# Patient Record
Sex: FEMALE | Race: WHITE | NOT HISPANIC OR LATINO | Employment: OTHER | URBAN - METROPOLITAN AREA
[De-identification: names, ages, dates, MRNs, and addresses within clinical notes are randomized per-mention and may not be internally consistent; named-entity substitution may affect disease eponyms.]

---

## 2017-05-08 PROBLEM — I82.811 THROMBOSIS OF RIGHT SAPHENOUS VEIN: Status: ACTIVE | Noted: 2017-05-08

## 2017-05-08 PROBLEM — Z86.718 HISTORY OF DEEP VEIN THROMBOSIS OF LOWER EXTREMITY: Status: ACTIVE | Noted: 2017-05-08

## 2017-05-23 ENCOUNTER — CONSULT (OUTPATIENT)
Dept: ONCOLOGY | Facility: CLINIC | Age: 62
End: 2017-05-23

## 2017-05-23 VITALS
TEMPERATURE: 97.4 F | RESPIRATION RATE: 22 BRPM | HEIGHT: 69 IN | HEART RATE: 66 BPM | WEIGHT: 293 LBS | SYSTOLIC BLOOD PRESSURE: 139 MMHG | DIASTOLIC BLOOD PRESSURE: 67 MMHG | BODY MASS INDEX: 43.4 KG/M2

## 2017-05-23 DIAGNOSIS — Z86.718 HISTORY OF DEEP VEIN THROMBOSIS OF LOWER EXTREMITY: Primary | ICD-10-CM

## 2017-05-23 PROCEDURE — 99243 OFF/OP CNSLTJ NEW/EST LOW 30: CPT | Performed by: INTERNAL MEDICINE

## 2017-05-23 RX ORDER — OXYCODONE HYDROCHLORIDE AND ACETAMINOPHEN 5; 325 MG/1; MG/1
TABLET ORAL
Refills: 0 | COMMUNITY
Start: 2017-03-07 | End: 2021-03-05 | Stop reason: HOSPADM

## 2017-05-23 RX ORDER — LEVOTHYROXINE SODIUM 137 UG/1
137 TABLET ORAL DAILY
Refills: 3 | COMMUNITY
Start: 2017-04-10

## 2018-05-07 ENCOUNTER — TRANSCRIBE ORDERS (OUTPATIENT)
Dept: ADMINISTRATIVE | Facility: HOSPITAL | Age: 63
End: 2018-05-07

## 2018-05-07 ENCOUNTER — HOSPITAL ENCOUNTER (OUTPATIENT)
Dept: GENERAL RADIOLOGY | Facility: HOSPITAL | Age: 63
Discharge: HOME | End: 2018-05-07
Attending: FAMILY MEDICINE | Admitting: FAMILY MEDICINE

## 2018-05-07 DIAGNOSIS — M54.50 LOW BACK PAIN AT MULTIPLE SITES: ICD-10-CM

## 2018-05-07 DIAGNOSIS — M25.552 LEFT HIP PAIN: ICD-10-CM

## 2018-05-07 DIAGNOSIS — M25.552 LEFT HIP PAIN: Primary | ICD-10-CM

## 2018-05-07 PROCEDURE — 72100 X-RAY EXAM L-S SPINE 2/3 VWS: CPT

## 2018-05-07 PROCEDURE — 73502 X-RAY EXAM HIP UNI 2-3 VIEWS: CPT

## 2020-01-30 ENCOUNTER — PREP FOR SURGERY (OUTPATIENT)
Dept: OTHER | Facility: HOSPITAL | Age: 65
End: 2020-01-30

## 2020-01-30 DIAGNOSIS — Z12.11 SPECIAL SCREENING FOR MALIGNANT NEOPLASMS, COLON: Primary | ICD-10-CM

## 2020-01-30 RX ORDER — DEXTROSE, SODIUM CHLORIDE, SODIUM LACTATE, POTASSIUM CHLORIDE, AND CALCIUM CHLORIDE 5; .6; .31; .03; .02 G/100ML; G/100ML; G/100ML; G/100ML; G/100ML
100 INJECTION, SOLUTION INTRAVENOUS CONTINUOUS
Status: CANCELLED | OUTPATIENT
Start: 2020-01-30

## 2020-01-30 RX ORDER — SODIUM CHLORIDE 0.9 % (FLUSH) 0.9 %
10 SYRINGE (ML) INJECTION AS NEEDED
Status: CANCELLED | OUTPATIENT
Start: 2020-01-30

## 2020-01-30 RX ORDER — SODIUM CHLORIDE 0.9 % (FLUSH) 0.9 %
3 SYRINGE (ML) INJECTION EVERY 12 HOURS SCHEDULED
Status: CANCELLED | OUTPATIENT
Start: 2020-01-30

## 2020-02-12 PROBLEM — Z12.11 SPECIAL SCREENING FOR MALIGNANT NEOPLASMS, COLON: Status: ACTIVE | Noted: 2020-02-12

## 2021-02-19 ENCOUNTER — APPOINTMENT (OUTPATIENT)
Dept: GENERAL RADIOLOGY | Facility: HOSPITAL | Age: 66
End: 2021-02-19

## 2021-02-19 ENCOUNTER — APPOINTMENT (OUTPATIENT)
Dept: CT IMAGING | Facility: HOSPITAL | Age: 66
End: 2021-02-19

## 2021-02-19 ENCOUNTER — HOSPITAL ENCOUNTER (INPATIENT)
Facility: HOSPITAL | Age: 66
LOS: 14 days | Discharge: HOME HEALTH | End: 2021-03-05
Attending: EMERGENCY MEDICINE | Admitting: INTERNAL MEDICINE

## 2021-02-19 DIAGNOSIS — L02.416 ABSCESS OF LEFT THIGH: ICD-10-CM

## 2021-02-19 DIAGNOSIS — E66.2 CLASS 3 OBESITY WITH ALVEOLAR HYPOVENTILATION WITHOUT SERIOUS COMORBIDITY WITH BODY MASS INDEX (BMI) OF 50.0 TO 59.9 IN ADULT (HCC): ICD-10-CM

## 2021-02-19 DIAGNOSIS — J81.0 ACUTE PULMONARY EDEMA (HCC): ICD-10-CM

## 2021-02-19 DIAGNOSIS — A41.9 SEPSIS DUE TO CELLULITIS (HCC): ICD-10-CM

## 2021-02-19 DIAGNOSIS — Z86.718 HISTORY OF DEEP VEIN THROMBOSIS OF LOWER EXTREMITY: ICD-10-CM

## 2021-02-19 DIAGNOSIS — A41.9 SEPSIS, DUE TO UNSPECIFIED ORGANISM, UNSPECIFIED WHETHER ACUTE ORGAN DYSFUNCTION PRESENT (HCC): Primary | ICD-10-CM

## 2021-02-19 DIAGNOSIS — M05.79 RHEUMATOID ARTHRITIS INVOLVING MULTIPLE SITES WITH POSITIVE RHEUMATOID FACTOR (HCC): ICD-10-CM

## 2021-02-19 DIAGNOSIS — L03.90 SEPSIS DUE TO CELLULITIS (HCC): ICD-10-CM

## 2021-02-19 DIAGNOSIS — L03.116 LEFT LEG CELLULITIS: ICD-10-CM

## 2021-02-19 DIAGNOSIS — R09.02 HYPOXIA: ICD-10-CM

## 2021-02-19 LAB
ALBUMIN SERPL-MCNC: 3.4 G/DL (ref 3.5–5.2)
ALBUMIN/GLOB SERPL: 1 G/DL
ALP SERPL-CCNC: 94 U/L (ref 39–117)
ALT SERPL W P-5'-P-CCNC: 10 U/L (ref 1–33)
ANION GAP SERPL CALCULATED.3IONS-SCNC: 13 MMOL/L (ref 5–15)
APTT PPP: 29.9 SECONDS (ref 24–37)
AST SERPL-CCNC: 15 U/L (ref 1–32)
BACTERIA UR QL AUTO: ABNORMAL /HPF
BASE EXCESS BLDA CALC-SCNC: 5 MMOL/L (ref -5–5)
BASOPHILS # BLD MANUAL: 0 10*3/MM3 (ref 0–0.2)
BASOPHILS NFR BLD AUTO: 0 % (ref 0–1.5)
BILIRUB SERPL-MCNC: 1.7 MG/DL (ref 0–1.2)
BILIRUB UR QL STRIP: NEGATIVE
BUN SERPL-MCNC: 18 MG/DL (ref 8–23)
BUN/CREAT SERPL: 14.8 (ref 7–25)
CA-I BLDA-SCNC: 1.1 MMOL/L (ref 1.2–1.32)
CALCIUM SPEC-SCNC: 9.5 MG/DL (ref 8.6–10.5)
CHLORIDE SERPL-SCNC: 96 MMOL/L (ref 98–107)
CK SERPL-CCNC: 116 U/L (ref 20–180)
CLARITY UR: CLEAR
CO2 BLDA-SCNC: 30 MMOL/L (ref 24–29)
CO2 SERPL-SCNC: 26 MMOL/L (ref 22–29)
COLOR UR: ABNORMAL
CREAT BLDA-MCNC: 1.2 MG/DL (ref 0.6–1.3)
CREAT SERPL-MCNC: 1.22 MG/DL (ref 0.57–1)
D-LACTATE SERPL-SCNC: 1.8 MMOL/L (ref 0.5–2)
DEPRECATED RDW RBC AUTO: 50.7 FL (ref 37–54)
EOSINOPHIL # BLD MANUAL: 0 10*3/MM3 (ref 0–0.4)
EOSINOPHIL NFR BLD MANUAL: 0 % (ref 0.3–6.2)
ERYTHROCYTE [DISTWIDTH] IN BLOOD BY AUTOMATED COUNT: 13.3 % (ref 12.3–15.4)
FLUAV RNA RESP QL NAA+PROBE: NOT DETECTED
FLUBV RNA RESP QL NAA+PROBE: NOT DETECTED
GFR SERPL CREATININE-BSD FRML MDRD: 44 ML/MIN/1.73
GLOBULIN UR ELPH-MCNC: 3.3 GM/DL
GLUCOSE BLDC GLUCOMTR-MCNC: 122 MG/DL (ref 70–130)
GLUCOSE SERPL-MCNC: 122 MG/DL (ref 65–99)
GLUCOSE UR STRIP-MCNC: NEGATIVE MG/DL
HCO3 BLDA-SCNC: 28.7 MMOL/L (ref 22–26)
HCT VFR BLD AUTO: 53 % (ref 34–46.6)
HCT VFR BLDA CALC: 55 % (ref 38–51)
HGB BLD-MCNC: 17.3 G/DL (ref 12–15.9)
HGB BLDA-MCNC: 18.7 G/DL (ref 12–17)
HGB UR QL STRIP.AUTO: NEGATIVE
HOLD SPECIMEN: NORMAL
HYALINE CASTS UR QL AUTO: ABNORMAL /LPF
INR PPP: 2.5 (ref 0.8–1.2)
KETONES UR QL STRIP: ABNORMAL
LEUKOCYTE ESTERASE UR QL STRIP.AUTO: ABNORMAL
LYMPHOCYTES # BLD MANUAL: 0.36 10*3/MM3 (ref 0.7–3.1)
LYMPHOCYTES NFR BLD MANUAL: 2 % (ref 19.6–45.3)
LYMPHOCYTES NFR BLD MANUAL: 7 % (ref 5–12)
MCH RBC QN AUTO: 33.5 PG (ref 26.6–33)
MCHC RBC AUTO-ENTMCNC: 32.6 G/DL (ref 31.5–35.7)
MCV RBC AUTO: 102.5 FL (ref 79–97)
MONOCYTES # BLD AUTO: 1.25 10*3/MM3 (ref 0.1–0.9)
NEUTROPHILS # BLD AUTO: 15.59 10*3/MM3 (ref 1.7–7)
NEUTROPHILS NFR BLD MANUAL: 81 % (ref 42.7–76)
NEUTS BAND NFR BLD MANUAL: 6 % (ref 0–5)
NITRITE UR QL STRIP: NEGATIVE
PCO2 BLDA: 41.7 MM HG (ref 35–45)
PH BLDA: 7.45 PH UNITS (ref 7.35–7.6)
PH UR STRIP.AUTO: <=5 [PH] (ref 5–8)
PLAT MORPH BLD: NORMAL
PLATELET # BLD AUTO: 158 10*3/MM3 (ref 140–450)
PMV BLD AUTO: 10.7 FL (ref 6–12)
PO2 BLDA: 21 MMHG (ref 80–105)
POTASSIUM BLDA-SCNC: 4.1 MMOL/L (ref 3.5–4.9)
POTASSIUM SERPL-SCNC: 4.2 MMOL/L (ref 3.5–5.2)
PROCALCITONIN SERPL-MCNC: 4.46 NG/ML (ref 0–0.25)
PROT SERPL-MCNC: 6.7 G/DL (ref 6–8.5)
PROT UR QL STRIP: ABNORMAL
PROTHROMBIN TIME: 28.3 SECONDS (ref 12.8–15.2)
RBC # BLD AUTO: 5.17 10*6/MM3 (ref 3.77–5.28)
RBC # UR: ABNORMAL /HPF
RBC MORPH BLD: NORMAL
REF LAB TEST METHOD: ABNORMAL
SAO2 % BLDA: 36 % (ref 95–98)
SARS-COV-2 RNA RESP QL NAA+PROBE: NOT DETECTED
SODIUM BLD-SCNC: 134 MMOL/L (ref 138–146)
SODIUM SERPL-SCNC: 135 MMOL/L (ref 136–145)
SP GR UR STRIP: 1.05 (ref 1–1.03)
SQUAMOUS #/AREA URNS HPF: ABNORMAL /HPF
TROPONIN T SERPL-MCNC: <0.01 NG/ML (ref 0–0.03)
UROBILINOGEN UR QL STRIP: ABNORMAL
VARIANT LYMPHS NFR BLD MANUAL: 4 % (ref 0–5)
WBC # BLD AUTO: 17.92 10*3/MM3 (ref 3.4–10.8)
WBC MORPH BLD: NORMAL
WBC UR QL AUTO: ABNORMAL /HPF
WHOLE BLOOD HOLD SPECIMEN: NORMAL
WHOLE BLOOD HOLD SPECIMEN: NORMAL

## 2021-02-19 PROCEDURE — 85610 PROTHROMBIN TIME: CPT

## 2021-02-19 PROCEDURE — 0042T HC CT CEREBRAL PERFUSION W/WO CONTRAST: CPT

## 2021-02-19 PROCEDURE — 87636 SARSCOV2 & INF A&B AMP PRB: CPT | Performed by: EMERGENCY MEDICINE

## 2021-02-19 PROCEDURE — 84132 ASSAY OF SERUM POTASSIUM: CPT

## 2021-02-19 PROCEDURE — 71045 X-RAY EXAM CHEST 1 VIEW: CPT

## 2021-02-19 PROCEDURE — 82330 ASSAY OF CALCIUM: CPT

## 2021-02-19 PROCEDURE — 85007 BL SMEAR W/DIFF WBC COUNT: CPT | Performed by: EMERGENCY MEDICINE

## 2021-02-19 PROCEDURE — 85014 HEMATOCRIT: CPT

## 2021-02-19 PROCEDURE — 84295 ASSAY OF SERUM SODIUM: CPT

## 2021-02-19 PROCEDURE — 0 IOPAMIDOL PER 1 ML: Performed by: EMERGENCY MEDICINE

## 2021-02-19 PROCEDURE — 83605 ASSAY OF LACTIC ACID: CPT | Performed by: EMERGENCY MEDICINE

## 2021-02-19 PROCEDURE — 93005 ELECTROCARDIOGRAM TRACING: CPT | Performed by: EMERGENCY MEDICINE

## 2021-02-19 PROCEDURE — 99223 1ST HOSP IP/OBS HIGH 75: CPT | Performed by: NURSE PRACTITIONER

## 2021-02-19 PROCEDURE — 99285 EMERGENCY DEPT VISIT HI MDM: CPT

## 2021-02-19 PROCEDURE — 84145 PROCALCITONIN (PCT): CPT | Performed by: EMERGENCY MEDICINE

## 2021-02-19 PROCEDURE — 70496 CT ANGIOGRAPHY HEAD: CPT

## 2021-02-19 PROCEDURE — 25010000002 MEROPENEM PER 100 MG: Performed by: EMERGENCY MEDICINE

## 2021-02-19 PROCEDURE — 73700 CT LOWER EXTREMITY W/O DYE: CPT

## 2021-02-19 PROCEDURE — 87040 BLOOD CULTURE FOR BACTERIA: CPT | Performed by: EMERGENCY MEDICINE

## 2021-02-19 PROCEDURE — 25010000002 VANCOMYCIN 10 G RECONSTITUTED SOLUTION: Performed by: EMERGENCY MEDICINE

## 2021-02-19 PROCEDURE — 80053 COMPREHEN METABOLIC PANEL: CPT | Performed by: EMERGENCY MEDICINE

## 2021-02-19 PROCEDURE — 72192 CT PELVIS W/O DYE: CPT

## 2021-02-19 PROCEDURE — 70498 CT ANGIOGRAPHY NECK: CPT

## 2021-02-19 PROCEDURE — 82565 ASSAY OF CREATININE: CPT

## 2021-02-19 PROCEDURE — 84484 ASSAY OF TROPONIN QUANT: CPT | Performed by: EMERGENCY MEDICINE

## 2021-02-19 PROCEDURE — 82947 ASSAY GLUCOSE BLOOD QUANT: CPT

## 2021-02-19 PROCEDURE — 85730 THROMBOPLASTIN TIME PARTIAL: CPT | Performed by: EMERGENCY MEDICINE

## 2021-02-19 PROCEDURE — 70450 CT HEAD/BRAIN W/O DYE: CPT

## 2021-02-19 PROCEDURE — 85025 COMPLETE CBC W/AUTO DIFF WBC: CPT | Performed by: EMERGENCY MEDICINE

## 2021-02-19 PROCEDURE — 82803 BLOOD GASES ANY COMBINATION: CPT

## 2021-02-19 PROCEDURE — 81001 URINALYSIS AUTO W/SCOPE: CPT | Performed by: EMERGENCY MEDICINE

## 2021-02-19 PROCEDURE — 82550 ASSAY OF CK (CPK): CPT | Performed by: INTERNAL MEDICINE

## 2021-02-19 RX ORDER — FERROUS SULFATE 325(65) MG
325 TABLET ORAL
Status: DISCONTINUED | OUTPATIENT
Start: 2021-02-20 | End: 2021-03-05 | Stop reason: HOSPADM

## 2021-02-19 RX ORDER — SODIUM CHLORIDE 0.9 % (FLUSH) 0.9 %
10 SYRINGE (ML) INJECTION EVERY 12 HOURS SCHEDULED
Status: DISCONTINUED | OUTPATIENT
Start: 2021-02-19 | End: 2021-03-05 | Stop reason: HOSPADM

## 2021-02-19 RX ORDER — HYDROCODONE BITARTRATE AND ACETAMINOPHEN 5; 325 MG/1; MG/1
1 TABLET ORAL EVERY 4 HOURS PRN
Status: DISCONTINUED | OUTPATIENT
Start: 2021-02-19 | End: 2021-02-19

## 2021-02-19 RX ORDER — METOPROLOL SUCCINATE 25 MG/1
25 TABLET, EXTENDED RELEASE ORAL DAILY
Status: DISCONTINUED | OUTPATIENT
Start: 2021-02-20 | End: 2021-03-05 | Stop reason: HOSPADM

## 2021-02-19 RX ORDER — GABAPENTIN 300 MG/1
300 CAPSULE ORAL EVERY 12 HOURS SCHEDULED
Status: DISCONTINUED | OUTPATIENT
Start: 2021-02-19 | End: 2021-03-04

## 2021-02-19 RX ORDER — SODIUM CHLORIDE 0.9 % (FLUSH) 0.9 %
10 SYRINGE (ML) INJECTION AS NEEDED
Status: DISCONTINUED | OUTPATIENT
Start: 2021-02-19 | End: 2021-03-05

## 2021-02-19 RX ORDER — LEVOTHYROXINE SODIUM 137 UG/1
137 TABLET ORAL
Status: DISCONTINUED | OUTPATIENT
Start: 2021-02-20 | End: 2021-03-05 | Stop reason: HOSPADM

## 2021-02-19 RX ORDER — DULOXETIN HYDROCHLORIDE 30 MG/1
30 CAPSULE, DELAYED RELEASE ORAL DAILY
Status: DISCONTINUED | OUTPATIENT
Start: 2021-02-20 | End: 2021-03-05 | Stop reason: HOSPADM

## 2021-02-19 RX ORDER — ALLOPURINOL 300 MG/1
300 TABLET ORAL DAILY
COMMUNITY

## 2021-02-19 RX ORDER — ALLOPURINOL 300 MG/1
300 TABLET ORAL DAILY
Status: DISCONTINUED | OUTPATIENT
Start: 2021-02-20 | End: 2021-03-05 | Stop reason: HOSPADM

## 2021-02-19 RX ORDER — ATORVASTATIN CALCIUM 40 MG/1
80 TABLET, FILM COATED ORAL NIGHTLY
Status: DISCONTINUED | OUTPATIENT
Start: 2021-02-19 | End: 2021-02-20

## 2021-02-19 RX ORDER — BACLOFEN 20 MG/1
20 TABLET ORAL 3 TIMES DAILY
COMMUNITY

## 2021-02-19 RX ORDER — SODIUM CHLORIDE 0.9 % (FLUSH) 0.9 %
10 SYRINGE (ML) INJECTION AS NEEDED
Status: DISCONTINUED | OUTPATIENT
Start: 2021-02-19 | End: 2021-03-05 | Stop reason: HOSPADM

## 2021-02-19 RX ORDER — FAMOTIDINE 10 MG
10 TABLET ORAL DAILY
COMMUNITY

## 2021-02-19 RX ORDER — ACETAMINOPHEN 325 MG/1
650 TABLET ORAL EVERY 6 HOURS PRN
Status: DISCONTINUED | OUTPATIENT
Start: 2021-02-19 | End: 2021-03-05 | Stop reason: HOSPADM

## 2021-02-19 RX ORDER — BACLOFEN 10 MG/1
10 TABLET ORAL 3 TIMES DAILY
Status: DISCONTINUED | OUTPATIENT
Start: 2021-02-19 | End: 2021-03-04

## 2021-02-19 RX ORDER — FAMOTIDINE 20 MG/1
20 TABLET, FILM COATED ORAL
Status: DISCONTINUED | OUTPATIENT
Start: 2021-02-20 | End: 2021-03-05 | Stop reason: HOSPADM

## 2021-02-19 RX ORDER — OXYCODONE HYDROCHLORIDE AND ACETAMINOPHEN 5; 325 MG/1; MG/1
1 TABLET ORAL EVERY 4 HOURS PRN
Status: DISCONTINUED | OUTPATIENT
Start: 2021-02-19 | End: 2021-03-04

## 2021-02-19 RX ORDER — ALUMINA, MAGNESIA, AND SIMETHICONE 2400; 2400; 240 MG/30ML; MG/30ML; MG/30ML
15 SUSPENSION ORAL EVERY 6 HOURS PRN
Status: DISCONTINUED | OUTPATIENT
Start: 2021-02-19 | End: 2021-03-05 | Stop reason: HOSPADM

## 2021-02-19 RX ORDER — ASPIRIN 81 MG/1
81 TABLET, CHEWABLE ORAL DAILY
Status: DISCONTINUED | OUTPATIENT
Start: 2021-02-20 | End: 2021-03-05 | Stop reason: HOSPADM

## 2021-02-19 RX ORDER — ASPIRIN 300 MG/1
300 SUPPOSITORY RECTAL DAILY
Status: DISCONTINUED | OUTPATIENT
Start: 2021-02-20 | End: 2021-02-27

## 2021-02-19 RX ORDER — FERROUS SULFATE 325(65) MG
325 TABLET ORAL
COMMUNITY
End: 2022-05-10

## 2021-02-19 RX ORDER — VANCOMYCIN HYDROCHLORIDE 1 G/200ML
1000 INJECTION, SOLUTION INTRAVENOUS
Status: DISCONTINUED | OUTPATIENT
Start: 2021-02-20 | End: 2021-02-20

## 2021-02-19 RX ADMIN — MEROPENEM 1 G: 1 INJECTION, POWDER, FOR SOLUTION INTRAVENOUS at 19:39

## 2021-02-19 RX ADMIN — IOPAMIDOL 115 ML: 755 INJECTION, SOLUTION INTRAVENOUS at 18:29

## 2021-02-19 RX ADMIN — SODIUM CHLORIDE, POTASSIUM CHLORIDE, SODIUM LACTATE AND CALCIUM CHLORIDE 1000 ML: 600; 310; 30; 20 INJECTION, SOLUTION INTRAVENOUS at 19:07

## 2021-02-19 RX ADMIN — VANCOMYCIN HYDROCHLORIDE 3000 MG: 100 INJECTION, POWDER, LYOPHILIZED, FOR SOLUTION INTRAVENOUS at 19:39

## 2021-02-19 RX ADMIN — BACLOFEN 10 MG: 10 TABLET ORAL at 23:04

## 2021-02-19 RX ADMIN — GABAPENTIN 300 MG: 300 CAPSULE ORAL at 23:04

## 2021-02-19 RX ADMIN — ACETAMINOPHEN 650 MG: 325 TABLET ORAL at 23:04

## 2021-02-19 RX ADMIN — ATORVASTATIN CALCIUM 80 MG: 40 TABLET, FILM COATED ORAL at 23:04

## 2021-02-19 RX ADMIN — SODIUM CHLORIDE, PRESERVATIVE FREE 10 ML: 5 INJECTION INTRAVENOUS at 23:05

## 2021-02-19 RX ADMIN — ALUMINUM HYDROXIDE, MAGNESIUM HYDROXIDE, AND DIMETHICONE 15 ML: 400; 400; 40 SUSPENSION ORAL at 21:44

## 2021-02-20 ENCOUNTER — APPOINTMENT (OUTPATIENT)
Dept: GENERAL RADIOLOGY | Facility: HOSPITAL | Age: 66
End: 2021-02-20

## 2021-02-20 ENCOUNTER — APPOINTMENT (OUTPATIENT)
Dept: MRI IMAGING | Facility: HOSPITAL | Age: 66
End: 2021-02-20

## 2021-02-20 ENCOUNTER — APPOINTMENT (OUTPATIENT)
Dept: CARDIOLOGY | Facility: HOSPITAL | Age: 66
End: 2021-02-20

## 2021-02-20 PROBLEM — N17.9 AKI (ACUTE KIDNEY INJURY) (HCC): Status: ACTIVE | Noted: 2021-02-20

## 2021-02-20 LAB
ANION GAP SERPL CALCULATED.3IONS-SCNC: 7 MMOL/L (ref 5–15)
APTT PPP: 28.3 SECONDS (ref 50–95)
ARTERIAL PATENCY WRIST A: ABNORMAL
ATMOSPHERIC PRESS: ABNORMAL MM[HG]
BASE EXCESS BLDA CALC-SCNC: 3.1 MMOL/L (ref 0–2)
BASOPHILS # BLD MANUAL: 0 10*3/MM3 (ref 0–0.2)
BASOPHILS NFR BLD AUTO: 0 % (ref 0–1.5)
BDY SITE: ABNORMAL
BH CV ECHO MEAS - AO MAX PG (FULL): 2.4 MMHG
BH CV ECHO MEAS - AO MAX PG: 11 MMHG
BH CV ECHO MEAS - AO MEAN PG (FULL): 3 MMHG
BH CV ECHO MEAS - AO MEAN PG: 7 MMHG
BH CV ECHO MEAS - AO ROOT AREA (BSA CORRECTED): 1.2
BH CV ECHO MEAS - AO ROOT AREA: 8.6 CM^2
BH CV ECHO MEAS - AO ROOT DIAM: 3.3 CM
BH CV ECHO MEAS - AO V2 MAX: 166 CM/SEC
BH CV ECHO MEAS - AO V2 MEAN: 123 CM/SEC
BH CV ECHO MEAS - AO V2 VTI: 34 CM
BH CV ECHO MEAS - AVA(I,A): 2.7 CM^2
BH CV ECHO MEAS - AVA(I,D): 2.7 CM^2
BH CV ECHO MEAS - AVA(V,A): 2.8 CM^2
BH CV ECHO MEAS - AVA(V,D): 2.8 CM^2
BH CV ECHO MEAS - BSA(HAYCOCK): 2.9 M^2
BH CV ECHO MEAS - BSA: 2.6 M^2
BH CV ECHO MEAS - BZI_BMI: 56 KILOGRAMS/M^2
BH CV ECHO MEAS - BZI_METRIC_HEIGHT: 172.7 CM
BH CV ECHO MEAS - BZI_METRIC_WEIGHT: 166.9 KG
BH CV ECHO MEAS - EDV(CUBED): 150.6 ML
BH CV ECHO MEAS - EDV(MOD-SP2): 67 ML
BH CV ECHO MEAS - EDV(MOD-SP4): 136 ML
BH CV ECHO MEAS - EDV(TEICH): 136.5 ML
BH CV ECHO MEAS - EF(CUBED): 75.9 %
BH CV ECHO MEAS - EF(MOD-BP): 61 %
BH CV ECHO MEAS - EF(MOD-SP2): 61.2 %
BH CV ECHO MEAS - EF(MOD-SP4): 78.7 %
BH CV ECHO MEAS - EF(TEICH): 67.4 %
BH CV ECHO MEAS - ESV(CUBED): 36.3 ML
BH CV ECHO MEAS - ESV(MOD-SP2): 26 ML
BH CV ECHO MEAS - ESV(MOD-SP4): 29 ML
BH CV ECHO MEAS - ESV(TEICH): 44.5 ML
BH CV ECHO MEAS - FS: 37.8 %
BH CV ECHO MEAS - IVS/LVPW: 1
BH CV ECHO MEAS - IVSD: 0.82 CM
BH CV ECHO MEAS - LA DIMENSION: 3.5 CM
BH CV ECHO MEAS - LA/AO: 1.1
BH CV ECHO MEAS - LAD MAJOR: 5.7 CM
BH CV ECHO MEAS - LAT PEAK E' VEL: 17.7 CM/SEC
BH CV ECHO MEAS - LATERAL E/E' RATIO: 4.3
BH CV ECHO MEAS - LV DIASTOLIC VOL/BSA (35-75): 51.3 ML/M^2
BH CV ECHO MEAS - LV MASS(C)D: 156 GRAMS
BH CV ECHO MEAS - LV MASS(C)DI: 58.9 GRAMS/M^2
BH CV ECHO MEAS - LV MAX PG: 8.6 MMHG
BH CV ECHO MEAS - LV MEAN PG: 4 MMHG
BH CV ECHO MEAS - LV SYSTOLIC VOL/BSA (12-30): 10.9 ML/M^2
BH CV ECHO MEAS - LV V1 MAX: 147 CM/SEC
BH CV ECHO MEAS - LV V1 MEAN: 95 CM/SEC
BH CV ECHO MEAS - LV V1 VTI: 29.3 CM
BH CV ECHO MEAS - LVIDD: 5.3 CM
BH CV ECHO MEAS - LVIDS: 3.3 CM
BH CV ECHO MEAS - LVLD AP2: 5.6 CM
BH CV ECHO MEAS - LVLD AP4: 7.4 CM
BH CV ECHO MEAS - LVLS AP2: 5.7 CM
BH CV ECHO MEAS - LVLS AP4: 5.9 CM
BH CV ECHO MEAS - LVOT AREA (M): 3.1 CM^2
BH CV ECHO MEAS - LVOT AREA: 3.1 CM^2
BH CV ECHO MEAS - LVOT DIAM: 2 CM
BH CV ECHO MEAS - LVPWD: 0.82 CM
BH CV ECHO MEAS - MED PEAK E' VEL: 7.2 CM/SEC
BH CV ECHO MEAS - MEDIAL E/E' RATIO: 10.6
BH CV ECHO MEAS - MV A MAX VEL: 61.2 CM/SEC
BH CV ECHO MEAS - MV DEC SLOPE: 283 CM/SEC^2
BH CV ECHO MEAS - MV DEC TIME: 0.18 SEC
BH CV ECHO MEAS - MV E MAX VEL: 76 CM/SEC
BH CV ECHO MEAS - MV E/A: 1.2
BH CV ECHO MEAS - MV MAX PG: 3.4 MMHG
BH CV ECHO MEAS - MV MEAN PG: 1 MMHG
BH CV ECHO MEAS - MV P1/2T MAX VEL: 97.7 CM/SEC
BH CV ECHO MEAS - MV P1/2T: 101.1 MSEC
BH CV ECHO MEAS - MV V2 MAX: 92.4 CM/SEC
BH CV ECHO MEAS - MV V2 MEAN: 57.5 CM/SEC
BH CV ECHO MEAS - MV V2 VTI: 24.2 CM
BH CV ECHO MEAS - MVA P1/2T LCG: 2.3 CM^2
BH CV ECHO MEAS - MVA(P1/2T): 2.2 CM^2
BH CV ECHO MEAS - MVA(VTI): 3.8 CM^2
BH CV ECHO MEAS - PA ACC SLOPE: 598 CM/SEC^2
BH CV ECHO MEAS - PA ACC TIME: 0.11 SEC
BH CV ECHO MEAS - PA PR(ACCEL): 31.3 MMHG
BH CV ECHO MEAS - SI(AO): 109.8 ML/M^2
BH CV ECHO MEAS - SI(CUBED): 43.2 ML/M^2
BH CV ECHO MEAS - SI(LVOT): 34.8 ML/M^2
BH CV ECHO MEAS - SI(MOD-SP2): 15.5 ML/M^2
BH CV ECHO MEAS - SI(MOD-SP4): 40.4 ML/M^2
BH CV ECHO MEAS - SI(TEICH): 34.8 ML/M^2
BH CV ECHO MEAS - SV(AO): 290.8 ML
BH CV ECHO MEAS - SV(CUBED): 114.3 ML
BH CV ECHO MEAS - SV(LVOT): 92 ML
BH CV ECHO MEAS - SV(MOD-SP2): 41 ML
BH CV ECHO MEAS - SV(MOD-SP4): 107 ML
BH CV ECHO MEAS - SV(TEICH): 92.1 ML
BH CV ECHO MEAS - TAPSE (>1.6): 2.8 CM
BH CV ECHO MEASUREMENTS AVERAGE E/E' RATIO: 6.1
BH CV XLRA - RV BASE: 4.2 CM
BH CV XLRA - RV LENGTH: 7.6 CM
BH CV XLRA - RV MID: 3.9 CM
BH CV XLRA - TDI S': 15.9 CM/SEC
BODY TEMPERATURE: 37 C
BUN SERPL-MCNC: 19 MG/DL (ref 8–23)
BUN/CREAT SERPL: 14.6 (ref 7–25)
CALCIUM SPEC-SCNC: 9.3 MG/DL (ref 8.6–10.5)
CHLORIDE SERPL-SCNC: 98 MMOL/L (ref 98–107)
CHOLEST SERPL-MCNC: 95 MG/DL (ref 0–200)
CK SERPL-CCNC: 136 U/L (ref 20–180)
CO2 BLDA-SCNC: 29.3 MMOL/L (ref 22–33)
CO2 SERPL-SCNC: 31 MMOL/L (ref 22–29)
COHGB MFR BLD: 0.8 % (ref 0–2)
CREAT SERPL-MCNC: 1.3 MG/DL (ref 0.57–1)
DEPRECATED RDW RBC AUTO: 51.4 FL (ref 37–54)
EOSINOPHIL # BLD MANUAL: 0 10*3/MM3 (ref 0–0.4)
EOSINOPHIL NFR BLD MANUAL: 0 % (ref 0.3–6.2)
EPAP: 0
ERYTHROCYTE [DISTWIDTH] IN BLOOD BY AUTOMATED COUNT: 13.3 % (ref 12.3–15.4)
GFR SERPL CREATININE-BSD FRML MDRD: 41 ML/MIN/1.73
GLUCOSE BLDC GLUCOMTR-MCNC: 106 MG/DL (ref 70–130)
GLUCOSE BLDC GLUCOMTR-MCNC: 110 MG/DL (ref 70–130)
GLUCOSE BLDC GLUCOMTR-MCNC: 119 MG/DL (ref 70–130)
GLUCOSE BLDC GLUCOMTR-MCNC: 97 MG/DL (ref 70–130)
GLUCOSE SERPL-MCNC: 120 MG/DL (ref 65–99)
HBA1C MFR BLD: 5.7 % (ref 4.8–5.6)
HCO3 BLDA-SCNC: 28 MMOL/L (ref 20–26)
HCT VFR BLD AUTO: 48.6 % (ref 34–46.6)
HCT VFR BLD CALC: 48.2 %
HDLC SERPL-MCNC: 60 MG/DL (ref 40–60)
HGB BLD-MCNC: 15.7 G/DL (ref 12–15.9)
HGB BLDA-MCNC: 15.7 G/DL (ref 14–18)
INHALED O2 CONCENTRATION: 80 %
INR PPP: 2.26 (ref 0.85–1.16)
IPAP: 0
LDLC SERPL CALC-MCNC: 21 MG/DL (ref 0–100)
LDLC/HDLC SERPL: 0.37 {RATIO}
LEFT ATRIUM VOLUME: 24.2 CM3
LYMPHOCYTES # BLD MANUAL: 0.84 10*3/MM3 (ref 0.7–3.1)
LYMPHOCYTES NFR BLD MANUAL: 5 % (ref 19.6–45.3)
LYMPHOCYTES NFR BLD MANUAL: 9 % (ref 5–12)
MACROCYTES BLD QL SMEAR: ABNORMAL
MCH RBC QN AUTO: 33.5 PG (ref 26.6–33)
MCHC RBC AUTO-ENTMCNC: 32.3 G/DL (ref 31.5–35.7)
MCV RBC AUTO: 103.6 FL (ref 79–97)
METHGB BLD QL: 0.3 % (ref 0–1.5)
MODALITY: ABNORMAL
MONOCYTES # BLD AUTO: 1.51 10*3/MM3 (ref 0.1–0.9)
NEUTROPHILS # BLD AUTO: 14.25 10*3/MM3 (ref 1.7–7)
NEUTROPHILS NFR BLD MANUAL: 74 % (ref 42.7–76)
NEUTS BAND NFR BLD MANUAL: 11 % (ref 0–5)
NOTE: ABNORMAL
OXYHGB MFR BLDV: 96.2 % (ref 94–99)
PAW @ PEAK INSP FLOW SETTING VENT: 0 CMH2O
PCO2 BLDA: 42.5 MM HG (ref 35–45)
PCO2 TEMP ADJ BLD: 42.5 MM HG (ref 35–45)
PH BLDA: 7.43 PH UNITS (ref 7.35–7.45)
PH, TEMP CORRECTED: 7.43 PH UNITS
PLAT MORPH BLD: NORMAL
PLATELET # BLD AUTO: 147 10*3/MM3 (ref 140–450)
PMV BLD AUTO: 10.6 FL (ref 6–12)
PO2 BLDA: 85.9 MM HG (ref 83–108)
PO2 TEMP ADJ BLD: 85.9 MM HG (ref 83–108)
POTASSIUM SERPL-SCNC: 4.4 MMOL/L (ref 3.5–5.2)
PROTHROMBIN TIME: 24.6 SECONDS (ref 11.5–14)
RBC # BLD AUTO: 4.69 10*6/MM3 (ref 3.77–5.28)
SCAN SLIDE: NORMAL
SODIUM SERPL-SCNC: 136 MMOL/L (ref 136–145)
TOTAL RATE: 0 BREATHS/MINUTE
TRIGL SERPL-MCNC: 63 MG/DL (ref 0–150)
UFH PPP CHRO-ACNC: 0.59 IU/ML (ref 0.3–0.7)
UFH PPP CHRO-ACNC: >1.1 IU/ML (ref 0.3–0.7)
UFH PPP CHRO-ACNC: >1.1 IU/ML (ref 0.3–0.7)
VARIANT LYMPHS NFR BLD MANUAL: 1 % (ref 0–5)
VLDLC SERPL-MCNC: 14 MG/DL (ref 5–40)
WBC # BLD AUTO: 16.77 10*3/MM3 (ref 3.4–10.8)
WBC MORPH BLD: NORMAL

## 2021-02-20 PROCEDURE — 85520 HEPARIN ASSAY: CPT | Performed by: INTERNAL MEDICINE

## 2021-02-20 PROCEDURE — 80048 BASIC METABOLIC PNL TOTAL CA: CPT | Performed by: INTERNAL MEDICINE

## 2021-02-20 PROCEDURE — 70551 MRI BRAIN STEM W/O DYE: CPT

## 2021-02-20 PROCEDURE — 94799 UNLISTED PULMONARY SVC/PX: CPT

## 2021-02-20 PROCEDURE — 85730 THROMBOPLASTIN TIME PARTIAL: CPT | Performed by: INTERNAL MEDICINE

## 2021-02-20 PROCEDURE — 99223 1ST HOSP IP/OBS HIGH 75: CPT | Performed by: INTERNAL MEDICINE

## 2021-02-20 PROCEDURE — 82805 BLOOD GASES W/O2 SATURATION: CPT

## 2021-02-20 PROCEDURE — 83050 HGB METHEMOGLOBIN QUAN: CPT

## 2021-02-20 PROCEDURE — 85025 COMPLETE CBC W/AUTO DIFF WBC: CPT | Performed by: INTERNAL MEDICINE

## 2021-02-20 PROCEDURE — 85610 PROTHROMBIN TIME: CPT | Performed by: INTERNAL MEDICINE

## 2021-02-20 PROCEDURE — 85520 HEPARIN ASSAY: CPT

## 2021-02-20 PROCEDURE — 82550 ASSAY OF CK (CPK): CPT | Performed by: PHYSICIAN ASSISTANT

## 2021-02-20 PROCEDURE — 36600 WITHDRAWAL OF ARTERIAL BLOOD: CPT

## 2021-02-20 PROCEDURE — 25010000002 DAPTOMYCIN PER 1 MG: Performed by: PHYSICIAN ASSISTANT

## 2021-02-20 PROCEDURE — 92523 SPEECH SOUND LANG COMPREHEN: CPT

## 2021-02-20 PROCEDURE — 93306 TTE W/DOPPLER COMPLETE: CPT

## 2021-02-20 PROCEDURE — 93306 TTE W/DOPPLER COMPLETE: CPT | Performed by: INTERNAL MEDICINE

## 2021-02-20 PROCEDURE — 94660 CPAP INITIATION&MGMT: CPT

## 2021-02-20 PROCEDURE — 80061 LIPID PANEL: CPT | Performed by: NURSE PRACTITIONER

## 2021-02-20 PROCEDURE — 97162 PT EVAL MOD COMPLEX 30 MIN: CPT | Performed by: PHYSICAL THERAPIST

## 2021-02-20 PROCEDURE — 82962 GLUCOSE BLOOD TEST: CPT

## 2021-02-20 PROCEDURE — 99233 SBSQ HOSP IP/OBS HIGH 50: CPT | Performed by: PSYCHIATRY & NEUROLOGY

## 2021-02-20 PROCEDURE — 97166 OT EVAL MOD COMPLEX 45 MIN: CPT

## 2021-02-20 PROCEDURE — 85007 BL SMEAR W/DIFF WBC COUNT: CPT | Performed by: INTERNAL MEDICINE

## 2021-02-20 PROCEDURE — 82375 ASSAY CARBOXYHB QUANT: CPT

## 2021-02-20 PROCEDURE — 25010000002 MEROPENEM PER 100 MG: Performed by: INTERNAL MEDICINE

## 2021-02-20 PROCEDURE — 97530 THERAPEUTIC ACTIVITIES: CPT | Performed by: PHYSICAL THERAPIST

## 2021-02-20 PROCEDURE — 25010000002 HEPARIN (PORCINE) 25000-0.45 UT/250ML-% SOLUTION

## 2021-02-20 PROCEDURE — 83036 HEMOGLOBIN GLYCOSYLATED A1C: CPT | Performed by: NURSE PRACTITIONER

## 2021-02-20 PROCEDURE — 71045 X-RAY EXAM CHEST 1 VIEW: CPT

## 2021-02-20 RX ORDER — SODIUM CHLORIDE 9 MG/ML
75 INJECTION, SOLUTION INTRAVENOUS CONTINUOUS
Status: ACTIVE | OUTPATIENT
Start: 2021-02-20 | End: 2021-02-20

## 2021-02-20 RX ORDER — HEPARIN SODIUM 10000 [USP'U]/100ML
15 INJECTION, SOLUTION INTRAVENOUS
Status: DISCONTINUED | OUTPATIENT
Start: 2021-02-20 | End: 2021-02-22

## 2021-02-20 RX ORDER — HEPARIN SODIUM 1000 [USP'U]/ML
10000 INJECTION, SOLUTION INTRAVENOUS; SUBCUTANEOUS AS NEEDED
Status: DISCONTINUED | OUTPATIENT
Start: 2021-02-20 | End: 2021-02-20

## 2021-02-20 RX ORDER — HEPARIN SODIUM 1000 [USP'U]/ML
40 INJECTION, SOLUTION INTRAVENOUS; SUBCUTANEOUS AS NEEDED
Status: DISCONTINUED | OUTPATIENT
Start: 2021-02-20 | End: 2021-02-20

## 2021-02-20 RX ORDER — HEPARIN SODIUM 10000 [USP'U]/100ML
1500 INJECTION, SOLUTION INTRAVENOUS
Status: DISCONTINUED | OUTPATIENT
Start: 2021-02-20 | End: 2021-02-20

## 2021-02-20 RX ORDER — BUMETANIDE 0.25 MG/ML
1 INJECTION INTRAMUSCULAR; INTRAVENOUS ONCE
Status: COMPLETED | OUTPATIENT
Start: 2021-02-20 | End: 2021-02-20

## 2021-02-20 RX ADMIN — MEROPENEM 1 G: 1 INJECTION, POWDER, FOR SOLUTION INTRAVENOUS at 20:12

## 2021-02-20 RX ADMIN — BACLOFEN 10 MG: 10 TABLET ORAL at 20:12

## 2021-02-20 RX ADMIN — SODIUM CHLORIDE 75 ML/HR: 9 INJECTION, SOLUTION INTRAVENOUS at 02:19

## 2021-02-20 RX ADMIN — DULOXETINE HYDROCHLORIDE 30 MG: 30 CAPSULE, DELAYED RELEASE ORAL at 08:08

## 2021-02-20 RX ADMIN — METOPROLOL SUCCINATE 25 MG: 25 TABLET, EXTENDED RELEASE ORAL at 08:09

## 2021-02-20 RX ADMIN — BACLOFEN 10 MG: 10 TABLET ORAL at 08:08

## 2021-02-20 RX ADMIN — SODIUM CHLORIDE, PRESERVATIVE FREE 10 ML: 5 INJECTION INTRAVENOUS at 20:12

## 2021-02-20 RX ADMIN — FAMOTIDINE 20 MG: 20 TABLET, FILM COATED ORAL at 17:45

## 2021-02-20 RX ADMIN — GABAPENTIN 300 MG: 300 CAPSULE ORAL at 20:12

## 2021-02-20 RX ADMIN — BUMETANIDE 1 MG: 0.25 INJECTION, SOLUTION INTRAMUSCULAR; INTRAVENOUS at 12:38

## 2021-02-20 RX ADMIN — BACLOFEN 10 MG: 10 TABLET ORAL at 17:45

## 2021-02-20 RX ADMIN — FERROUS SULFATE TAB 325 MG (65 MG ELEMENTAL FE) 325 MG: 325 (65 FE) TAB at 08:09

## 2021-02-20 RX ADMIN — MEROPENEM 1 G: 1 INJECTION, POWDER, FOR SOLUTION INTRAVENOUS at 12:38

## 2021-02-20 RX ADMIN — SODIUM CHLORIDE, PRESERVATIVE FREE 10 ML: 5 INJECTION INTRAVENOUS at 08:09

## 2021-02-20 RX ADMIN — ALLOPURINOL 300 MG: 300 TABLET ORAL at 08:09

## 2021-02-20 RX ADMIN — GABAPENTIN 300 MG: 300 CAPSULE ORAL at 08:09

## 2021-02-20 RX ADMIN — ASPIRIN 81 MG: 81 TABLET, CHEWABLE ORAL at 08:09

## 2021-02-20 RX ADMIN — LEVOTHYROXINE SODIUM 137 MCG: 0.14 TABLET ORAL at 05:26

## 2021-02-20 RX ADMIN — SODIUM CHLORIDE 650 MG: 9 INJECTION, SOLUTION INTRAVENOUS at 15:07

## 2021-02-20 RX ADMIN — MEROPENEM 1 G: 1 INJECTION, POWDER, FOR SOLUTION INTRAVENOUS at 05:27

## 2021-02-20 RX ADMIN — OXYCODONE HYDROCHLORIDE AND ACETAMINOPHEN 0.5 TABLET: 5; 325 TABLET ORAL at 20:11

## 2021-02-20 RX ADMIN — ACETAMINOPHEN 650 MG: 325 TABLET ORAL at 20:11

## 2021-02-20 RX ADMIN — FAMOTIDINE 20 MG: 20 TABLET, FILM COATED ORAL at 08:08

## 2021-02-20 RX ADMIN — HEPARIN SODIUM 7 UNITS/KG/HR: 10000 INJECTION, SOLUTION INTRAVENOUS at 20:23

## 2021-02-21 LAB
ANION GAP SERPL CALCULATED.3IONS-SCNC: 6 MMOL/L (ref 5–15)
BUN SERPL-MCNC: 16 MG/DL (ref 8–23)
BUN/CREAT SERPL: 18.2 (ref 7–25)
CALCIUM SPEC-SCNC: 8.9 MG/DL (ref 8.6–10.5)
CHLORIDE SERPL-SCNC: 100 MMOL/L (ref 98–107)
CO2 SERPL-SCNC: 28 MMOL/L (ref 22–29)
CREAT SERPL-MCNC: 0.88 MG/DL (ref 0.57–1)
DEPRECATED RDW RBC AUTO: 53.1 FL (ref 37–54)
ERYTHROCYTE [DISTWIDTH] IN BLOOD BY AUTOMATED COUNT: 13.2 % (ref 12.3–15.4)
GFR SERPL CREATININE-BSD FRML MDRD: 64 ML/MIN/1.73
GLUCOSE BLDC GLUCOMTR-MCNC: 98 MG/DL (ref 70–130)
GLUCOSE SERPL-MCNC: 124 MG/DL (ref 65–99)
HCT VFR BLD AUTO: 46.6 % (ref 34–46.6)
HGB BLD-MCNC: 14.5 G/DL (ref 12–15.9)
MCH RBC QN AUTO: 33.5 PG (ref 26.6–33)
MCHC RBC AUTO-ENTMCNC: 31.1 G/DL (ref 31.5–35.7)
MCV RBC AUTO: 107.6 FL (ref 79–97)
PLATELET # BLD AUTO: 148 10*3/MM3 (ref 140–450)
PMV BLD AUTO: 10.4 FL (ref 6–12)
POTASSIUM SERPL-SCNC: 3.8 MMOL/L (ref 3.5–5.2)
QT INTERVAL: 326 MS
QTC INTERVAL: 405 MS
RBC # BLD AUTO: 4.33 10*6/MM3 (ref 3.77–5.28)
SODIUM SERPL-SCNC: 134 MMOL/L (ref 136–145)
UFH PPP CHRO-ACNC: 0.17 IU/ML (ref 0.3–0.7)
UFH PPP CHRO-ACNC: 0.26 IU/ML (ref 0.3–0.7)
UFH PPP CHRO-ACNC: 0.43 IU/ML (ref 0.3–0.7)
WBC # BLD AUTO: 11.02 10*3/MM3 (ref 3.4–10.8)

## 2021-02-21 PROCEDURE — 85520 HEPARIN ASSAY: CPT | Performed by: HOSPITALIST

## 2021-02-21 PROCEDURE — 25010000002 HEPARIN (PORCINE) 25000-0.45 UT/250ML-% SOLUTION

## 2021-02-21 PROCEDURE — 94660 CPAP INITIATION&MGMT: CPT

## 2021-02-21 PROCEDURE — 99231 SBSQ HOSP IP/OBS SF/LOW 25: CPT | Performed by: NURSE PRACTITIONER

## 2021-02-21 PROCEDURE — 25010000002 HEPARIN (PORCINE) PER 1000 UNITS

## 2021-02-21 PROCEDURE — 80048 BASIC METABOLIC PNL TOTAL CA: CPT | Performed by: HOSPITALIST

## 2021-02-21 PROCEDURE — 99233 SBSQ HOSP IP/OBS HIGH 50: CPT | Performed by: HOSPITALIST

## 2021-02-21 PROCEDURE — 85520 HEPARIN ASSAY: CPT

## 2021-02-21 PROCEDURE — 25010000002 MEROPENEM PER 100 MG: Performed by: INTERNAL MEDICINE

## 2021-02-21 PROCEDURE — 94799 UNLISTED PULMONARY SVC/PX: CPT

## 2021-02-21 PROCEDURE — 85027 COMPLETE CBC AUTOMATED: CPT | Performed by: HOSPITALIST

## 2021-02-21 PROCEDURE — 25010000002 CEFTRIAXONE PER 250 MG: Performed by: INTERNAL MEDICINE

## 2021-02-21 PROCEDURE — 82962 GLUCOSE BLOOD TEST: CPT

## 2021-02-21 PROCEDURE — 25010000002 DAPTOMYCIN PER 1 MG: Performed by: PHYSICIAN ASSISTANT

## 2021-02-21 RX ORDER — HEPARIN SODIUM 1000 [USP'U]/ML
5000 INJECTION, SOLUTION INTRAVENOUS; SUBCUTANEOUS ONCE
Status: COMPLETED | OUTPATIENT
Start: 2021-02-21 | End: 2021-02-21

## 2021-02-21 RX ADMIN — CEFTRIAXONE SODIUM 2 G: 2 INJECTION, POWDER, FOR SOLUTION INTRAMUSCULAR; INTRAVENOUS at 16:29

## 2021-02-21 RX ADMIN — FERROUS SULFATE TAB 325 MG (65 MG ELEMENTAL FE) 325 MG: 325 (65 FE) TAB at 08:19

## 2021-02-21 RX ADMIN — BACLOFEN 10 MG: 10 TABLET ORAL at 08:19

## 2021-02-21 RX ADMIN — DULOXETINE HYDROCHLORIDE 30 MG: 30 CAPSULE, DELAYED RELEASE ORAL at 08:19

## 2021-02-21 RX ADMIN — GABAPENTIN 300 MG: 300 CAPSULE ORAL at 22:01

## 2021-02-21 RX ADMIN — ASPIRIN 81 MG: 81 TABLET, CHEWABLE ORAL at 08:19

## 2021-02-21 RX ADMIN — SODIUM CHLORIDE, PRESERVATIVE FREE 10 ML: 5 INJECTION INTRAVENOUS at 22:01

## 2021-02-21 RX ADMIN — LEVOTHYROXINE SODIUM 137 MCG: 0.14 TABLET ORAL at 05:06

## 2021-02-21 RX ADMIN — ALLOPURINOL 300 MG: 300 TABLET ORAL at 08:18

## 2021-02-21 RX ADMIN — GABAPENTIN 300 MG: 300 CAPSULE ORAL at 08:19

## 2021-02-21 RX ADMIN — BACLOFEN 10 MG: 10 TABLET ORAL at 22:01

## 2021-02-21 RX ADMIN — SODIUM CHLORIDE 650 MG: 9 INJECTION, SOLUTION INTRAVENOUS at 12:08

## 2021-02-21 RX ADMIN — FAMOTIDINE 20 MG: 20 TABLET, FILM COATED ORAL at 17:55

## 2021-02-21 RX ADMIN — ACETAMINOPHEN 650 MG: 325 TABLET ORAL at 05:06

## 2021-02-21 RX ADMIN — OXYCODONE HYDROCHLORIDE AND ACETAMINOPHEN 1 TABLET: 5; 325 TABLET ORAL at 09:22

## 2021-02-21 RX ADMIN — OXYCODONE HYDROCHLORIDE AND ACETAMINOPHEN 1 TABLET: 5; 325 TABLET ORAL at 17:51

## 2021-02-21 RX ADMIN — BACLOFEN 10 MG: 10 TABLET ORAL at 17:51

## 2021-02-21 RX ADMIN — FAMOTIDINE 20 MG: 20 TABLET, FILM COATED ORAL at 08:19

## 2021-02-21 RX ADMIN — METOPROLOL SUCCINATE 25 MG: 25 TABLET, EXTENDED RELEASE ORAL at 08:19

## 2021-02-21 RX ADMIN — HEPARIN SODIUM 13 UNITS/KG/HR: 10000 INJECTION, SOLUTION INTRAVENOUS at 13:27

## 2021-02-21 RX ADMIN — MEROPENEM 1 G: 1 INJECTION, POWDER, FOR SOLUTION INTRAVENOUS at 13:24

## 2021-02-21 RX ADMIN — HEPARIN SODIUM 5000 UNITS: 1000 INJECTION, SOLUTION INTRAVENOUS; SUBCUTANEOUS at 12:06

## 2021-02-21 RX ADMIN — MEROPENEM 1 G: 1 INJECTION, POWDER, FOR SOLUTION INTRAVENOUS at 05:06

## 2021-02-21 RX ADMIN — OXYCODONE HYDROCHLORIDE AND ACETAMINOPHEN 0.5 TABLET: 5; 325 TABLET ORAL at 23:59

## 2021-02-22 LAB
ALBUMIN SERPL-MCNC: 2.4 G/DL (ref 3.5–5.2)
ALBUMIN/GLOB SERPL: 0.8 G/DL
ALP SERPL-CCNC: 111 U/L (ref 39–117)
ALT SERPL W P-5'-P-CCNC: 7 U/L (ref 1–33)
ANION GAP SERPL CALCULATED.3IONS-SCNC: 7 MMOL/L (ref 5–15)
AST SERPL-CCNC: 10 U/L (ref 1–32)
BASOPHILS # BLD AUTO: 0.03 10*3/MM3 (ref 0–0.2)
BASOPHILS NFR BLD AUTO: 0.2 % (ref 0–1.5)
BILIRUB SERPL-MCNC: 0.4 MG/DL (ref 0–1.2)
BUN SERPL-MCNC: 14 MG/DL (ref 8–23)
BUN/CREAT SERPL: 17.7 (ref 7–25)
CALCIUM SPEC-SCNC: 8.7 MG/DL (ref 8.6–10.5)
CHLORIDE SERPL-SCNC: 101 MMOL/L (ref 98–107)
CO2 SERPL-SCNC: 28 MMOL/L (ref 22–29)
CREAT SERPL-MCNC: 0.79 MG/DL (ref 0.57–1)
DEPRECATED RDW RBC AUTO: 52.6 FL (ref 37–54)
EOSINOPHIL # BLD AUTO: 0.12 10*3/MM3 (ref 0–0.4)
EOSINOPHIL NFR BLD AUTO: 1 % (ref 0.3–6.2)
ERYTHROCYTE [DISTWIDTH] IN BLOOD BY AUTOMATED COUNT: 13.2 % (ref 12.3–15.4)
GFR SERPL CREATININE-BSD FRML MDRD: 73 ML/MIN/1.73
GLOBULIN UR ELPH-MCNC: 3.1 GM/DL
GLUCOSE SERPL-MCNC: 90 MG/DL (ref 65–99)
HCT VFR BLD AUTO: 47.4 % (ref 34–46.6)
HGB BLD-MCNC: 14.7 G/DL (ref 12–15.9)
IMM GRANULOCYTES # BLD AUTO: 0.07 10*3/MM3 (ref 0–0.05)
IMM GRANULOCYTES NFR BLD AUTO: 0.6 % (ref 0–0.5)
LYMPHOCYTES # BLD AUTO: 0.98 10*3/MM3 (ref 0.7–3.1)
LYMPHOCYTES NFR BLD AUTO: 8.1 % (ref 19.6–45.3)
MAGNESIUM SERPL-MCNC: 1.7 MG/DL (ref 1.6–2.4)
MCH RBC QN AUTO: 33.1 PG (ref 26.6–33)
MCHC RBC AUTO-ENTMCNC: 31 G/DL (ref 31.5–35.7)
MCV RBC AUTO: 106.8 FL (ref 79–97)
MONOCYTES # BLD AUTO: 1.16 10*3/MM3 (ref 0.1–0.9)
MONOCYTES NFR BLD AUTO: 9.5 % (ref 5–12)
NEUTROPHILS NFR BLD AUTO: 80.6 % (ref 42.7–76)
NEUTROPHILS NFR BLD AUTO: 9.81 10*3/MM3 (ref 1.7–7)
NRBC BLD AUTO-RTO: 0 /100 WBC (ref 0–0.2)
PHOSPHATE SERPL-MCNC: 2.7 MG/DL (ref 2.5–4.5)
PLATELET # BLD AUTO: 158 10*3/MM3 (ref 140–450)
PMV BLD AUTO: 10.7 FL (ref 6–12)
POTASSIUM SERPL-SCNC: 3.8 MMOL/L (ref 3.5–5.2)
PROT SERPL-MCNC: 5.5 G/DL (ref 6–8.5)
RBC # BLD AUTO: 4.44 10*6/MM3 (ref 3.77–5.28)
SODIUM SERPL-SCNC: 136 MMOL/L (ref 136–145)
UFH PPP CHRO-ACNC: 0.29 IU/ML (ref 0.3–0.7)
UFH PPP CHRO-ACNC: 0.34 IU/ML (ref 0.3–0.7)
WBC # BLD AUTO: 12.17 10*3/MM3 (ref 3.4–10.8)

## 2021-02-22 PROCEDURE — 80053 COMPREHEN METABOLIC PANEL: CPT | Performed by: SURGERY

## 2021-02-22 PROCEDURE — 97530 THERAPEUTIC ACTIVITIES: CPT

## 2021-02-22 PROCEDURE — 25010000003 MAGNESIUM SULFATE 4 GM/100ML SOLUTION: Performed by: HOSPITALIST

## 2021-02-22 PROCEDURE — 99233 SBSQ HOSP IP/OBS HIGH 50: CPT | Performed by: HOSPITALIST

## 2021-02-22 PROCEDURE — 94660 CPAP INITIATION&MGMT: CPT

## 2021-02-22 PROCEDURE — 94799 UNLISTED PULMONARY SVC/PX: CPT

## 2021-02-22 PROCEDURE — 85025 COMPLETE CBC W/AUTO DIFF WBC: CPT | Performed by: SURGERY

## 2021-02-22 PROCEDURE — 25010000002 CEFTRIAXONE PER 250 MG: Performed by: INTERNAL MEDICINE

## 2021-02-22 PROCEDURE — 83735 ASSAY OF MAGNESIUM: CPT | Performed by: SURGERY

## 2021-02-22 PROCEDURE — 85520 HEPARIN ASSAY: CPT

## 2021-02-22 PROCEDURE — 84100 ASSAY OF PHOSPHORUS: CPT | Performed by: SURGERY

## 2021-02-22 PROCEDURE — 25010000002 DAPTOMYCIN PER 1 MG: Performed by: PHYSICIAN ASSISTANT

## 2021-02-22 RX ORDER — MAGNESIUM SULFATE HEPTAHYDRATE 40 MG/ML
2 INJECTION, SOLUTION INTRAVENOUS AS NEEDED
Status: DISCONTINUED | OUTPATIENT
Start: 2021-02-22 | End: 2021-03-05 | Stop reason: HOSPADM

## 2021-02-22 RX ORDER — MAGNESIUM SULFATE HEPTAHYDRATE 40 MG/ML
4 INJECTION, SOLUTION INTRAVENOUS AS NEEDED
Status: DISCONTINUED | OUTPATIENT
Start: 2021-02-22 | End: 2021-03-05 | Stop reason: HOSPADM

## 2021-02-22 RX ORDER — DIPHENOXYLATE HYDROCHLORIDE AND ATROPINE SULFATE 2.5; .025 MG/1; MG/1
1 TABLET ORAL DAILY
Status: DISCONTINUED | OUTPATIENT
Start: 2021-02-22 | End: 2021-03-05 | Stop reason: HOSPADM

## 2021-02-22 RX ADMIN — DULOXETINE HYDROCHLORIDE 30 MG: 30 CAPSULE, DELAYED RELEASE ORAL at 08:44

## 2021-02-22 RX ADMIN — MAGNESIUM SULFATE 4 G: 4 INJECTION INTRAVENOUS at 12:43

## 2021-02-22 RX ADMIN — FERROUS SULFATE TAB 325 MG (65 MG ELEMENTAL FE) 325 MG: 325 (65 FE) TAB at 08:42

## 2021-02-22 RX ADMIN — ASPIRIN 81 MG: 81 TABLET, CHEWABLE ORAL at 08:42

## 2021-02-22 RX ADMIN — MULTIVITAMIN TABLET 1 TABLET: TABLET at 12:43

## 2021-02-22 RX ADMIN — CEFTRIAXONE SODIUM 2 G: 2 INJECTION, POWDER, FOR SOLUTION INTRAMUSCULAR; INTRAVENOUS at 16:53

## 2021-02-22 RX ADMIN — LEVOTHYROXINE SODIUM 137 MCG: 0.14 TABLET ORAL at 06:17

## 2021-02-22 RX ADMIN — FAMOTIDINE 20 MG: 20 TABLET, FILM COATED ORAL at 16:53

## 2021-02-22 RX ADMIN — SODIUM CHLORIDE, PRESERVATIVE FREE 10 ML: 5 INJECTION INTRAVENOUS at 20:00

## 2021-02-22 RX ADMIN — ALLOPURINOL 300 MG: 300 TABLET ORAL at 08:42

## 2021-02-22 RX ADMIN — GABAPENTIN 300 MG: 300 CAPSULE ORAL at 20:01

## 2021-02-22 RX ADMIN — RIVAROXABAN 20 MG: 20 TABLET, FILM COATED ORAL at 12:43

## 2021-02-22 RX ADMIN — SODIUM CHLORIDE 650 MG: 9 INJECTION, SOLUTION INTRAVENOUS at 12:44

## 2021-02-22 RX ADMIN — BACLOFEN 10 MG: 10 TABLET ORAL at 08:42

## 2021-02-22 RX ADMIN — GABAPENTIN 300 MG: 300 CAPSULE ORAL at 08:44

## 2021-02-22 RX ADMIN — OXYCODONE HYDROCHLORIDE AND ACETAMINOPHEN 1 TABLET: 5; 325 TABLET ORAL at 20:00

## 2021-02-22 RX ADMIN — BACLOFEN 10 MG: 10 TABLET ORAL at 16:53

## 2021-02-22 RX ADMIN — FAMOTIDINE 20 MG: 20 TABLET, FILM COATED ORAL at 06:17

## 2021-02-22 RX ADMIN — BACLOFEN 10 MG: 10 TABLET ORAL at 20:00

## 2021-02-23 LAB
ANION GAP SERPL CALCULATED.3IONS-SCNC: 6 MMOL/L (ref 5–15)
BUN SERPL-MCNC: 12 MG/DL (ref 8–23)
BUN/CREAT SERPL: 15.2 (ref 7–25)
CALCIUM SPEC-SCNC: 9.1 MG/DL (ref 8.6–10.5)
CHLORIDE SERPL-SCNC: 102 MMOL/L (ref 98–107)
CO2 SERPL-SCNC: 29 MMOL/L (ref 22–29)
CREAT SERPL-MCNC: 0.79 MG/DL (ref 0.57–1)
DEPRECATED RDW RBC AUTO: 50.9 FL (ref 37–54)
ERYTHROCYTE [DISTWIDTH] IN BLOOD BY AUTOMATED COUNT: 13.2 % (ref 12.3–15.4)
GFR SERPL CREATININE-BSD FRML MDRD: 73 ML/MIN/1.73
GLUCOSE BLDC GLUCOMTR-MCNC: 139 MG/DL (ref 70–130)
GLUCOSE BLDC GLUCOMTR-MCNC: 158 MG/DL (ref 70–130)
GLUCOSE SERPL-MCNC: 122 MG/DL (ref 65–99)
HCT VFR BLD AUTO: 47.7 % (ref 34–46.6)
HGB BLD-MCNC: 14.9 G/DL (ref 12–15.9)
MAGNESIUM SERPL-MCNC: 2.1 MG/DL (ref 1.6–2.4)
MCH RBC QN AUTO: 32.5 PG (ref 26.6–33)
MCHC RBC AUTO-ENTMCNC: 31.2 G/DL (ref 31.5–35.7)
MCV RBC AUTO: 104.1 FL (ref 79–97)
PLATELET # BLD AUTO: 171 10*3/MM3 (ref 140–450)
PMV BLD AUTO: 10.5 FL (ref 6–12)
POTASSIUM SERPL-SCNC: 4.1 MMOL/L (ref 3.5–5.2)
RBC # BLD AUTO: 4.58 10*6/MM3 (ref 3.77–5.28)
SODIUM SERPL-SCNC: 137 MMOL/L (ref 136–145)
WBC # BLD AUTO: 12.93 10*3/MM3 (ref 3.4–10.8)

## 2021-02-23 PROCEDURE — 94799 UNLISTED PULMONARY SVC/PX: CPT

## 2021-02-23 PROCEDURE — 85027 COMPLETE CBC AUTOMATED: CPT | Performed by: HOSPITALIST

## 2021-02-23 PROCEDURE — 83735 ASSAY OF MAGNESIUM: CPT | Performed by: HOSPITALIST

## 2021-02-23 PROCEDURE — 80048 BASIC METABOLIC PNL TOTAL CA: CPT | Performed by: HOSPITALIST

## 2021-02-23 PROCEDURE — 94660 CPAP INITIATION&MGMT: CPT

## 2021-02-23 PROCEDURE — 25010000002 DAPTOMYCIN PER 1 MG: Performed by: PHYSICIAN ASSISTANT

## 2021-02-23 PROCEDURE — 99232 SBSQ HOSP IP/OBS MODERATE 35: CPT | Performed by: PSYCHIATRY & NEUROLOGY

## 2021-02-23 PROCEDURE — 82962 GLUCOSE BLOOD TEST: CPT

## 2021-02-23 PROCEDURE — 97110 THERAPEUTIC EXERCISES: CPT

## 2021-02-23 PROCEDURE — 99233 SBSQ HOSP IP/OBS HIGH 50: CPT | Performed by: HOSPITALIST

## 2021-02-23 PROCEDURE — 97530 THERAPEUTIC ACTIVITIES: CPT

## 2021-02-23 PROCEDURE — 25010000002 CEFTRIAXONE PER 250 MG: Performed by: INTERNAL MEDICINE

## 2021-02-23 RX ADMIN — BACLOFEN 10 MG: 10 TABLET ORAL at 20:10

## 2021-02-23 RX ADMIN — FAMOTIDINE 20 MG: 20 TABLET, FILM COATED ORAL at 05:17

## 2021-02-23 RX ADMIN — CEFTRIAXONE SODIUM 2 G: 2 INJECTION, POWDER, FOR SOLUTION INTRAMUSCULAR; INTRAVENOUS at 17:28

## 2021-02-23 RX ADMIN — GABAPENTIN 300 MG: 300 CAPSULE ORAL at 20:10

## 2021-02-23 RX ADMIN — GABAPENTIN 300 MG: 300 CAPSULE ORAL at 08:42

## 2021-02-23 RX ADMIN — FERROUS SULFATE TAB 325 MG (65 MG ELEMENTAL FE) 325 MG: 325 (65 FE) TAB at 08:42

## 2021-02-23 RX ADMIN — RIVAROXABAN 20 MG: 20 TABLET, FILM COATED ORAL at 17:28

## 2021-02-23 RX ADMIN — DULOXETINE HYDROCHLORIDE 30 MG: 30 CAPSULE, DELAYED RELEASE ORAL at 08:42

## 2021-02-23 RX ADMIN — BACLOFEN 10 MG: 10 TABLET ORAL at 17:28

## 2021-02-23 RX ADMIN — METOPROLOL SUCCINATE 25 MG: 25 TABLET, EXTENDED RELEASE ORAL at 08:42

## 2021-02-23 RX ADMIN — LEVOTHYROXINE SODIUM 137 MCG: 0.14 TABLET ORAL at 05:17

## 2021-02-23 RX ADMIN — BACLOFEN 10 MG: 10 TABLET ORAL at 08:42

## 2021-02-23 RX ADMIN — SODIUM CHLORIDE, PRESERVATIVE FREE 10 ML: 5 INJECTION INTRAVENOUS at 08:45

## 2021-02-23 RX ADMIN — ASPIRIN 81 MG: 81 TABLET, CHEWABLE ORAL at 08:42

## 2021-02-23 RX ADMIN — ALLOPURINOL 300 MG: 300 TABLET ORAL at 08:42

## 2021-02-23 RX ADMIN — ACETAMINOPHEN 650 MG: 325 TABLET ORAL at 20:10

## 2021-02-23 RX ADMIN — MULTIVITAMIN TABLET 1 TABLET: TABLET at 08:42

## 2021-02-23 RX ADMIN — FAMOTIDINE 20 MG: 20 TABLET, FILM COATED ORAL at 17:28

## 2021-02-23 RX ADMIN — SODIUM CHLORIDE 650 MG: 9 INJECTION, SOLUTION INTRAVENOUS at 13:37

## 2021-02-23 RX ADMIN — SODIUM CHLORIDE, PRESERVATIVE FREE 10 ML: 5 INJECTION INTRAVENOUS at 20:10

## 2021-02-24 ENCOUNTER — APPOINTMENT (OUTPATIENT)
Dept: ULTRASOUND IMAGING | Facility: HOSPITAL | Age: 66
End: 2021-02-24

## 2021-02-24 LAB
ARTERIAL PATENCY WRIST A: ABNORMAL
ATMOSPHERIC PRESS: ABNORMAL MM[HG]
BACTERIA SPEC AEROBE CULT: NORMAL
BACTERIA SPEC AEROBE CULT: NORMAL
BASE EXCESS BLDA CALC-SCNC: 6.5 MMOL/L (ref 0–2)
BDY SITE: ABNORMAL
BODY TEMPERATURE: 37 C
CO2 BLDA-SCNC: 32.8 MMOL/L (ref 22–33)
COHGB MFR BLD: 0.8 % (ref 0–2)
EPAP: 0
HCO3 BLDA-SCNC: 31.4 MMOL/L (ref 20–26)
HCT VFR BLD CALC: 46.5 %
HGB BLDA-MCNC: 15.2 G/DL (ref 14–18)
INHALED O2 CONCENTRATION: 46 %
IPAP: 0
METHGB BLD QL: 0.1 % (ref 0–1.5)
MODALITY: ABNORMAL
NOTE: ABNORMAL
OXYHGB MFR BLDV: 95.7 % (ref 94–99)
PAW @ PEAK INSP FLOW SETTING VENT: 0 CMH2O
PCO2 BLDA: 44.5 MM HG (ref 35–45)
PCO2 TEMP ADJ BLD: 44.5 MM HG (ref 35–45)
PH BLDA: 7.46 PH UNITS (ref 7.35–7.45)
PH, TEMP CORRECTED: 7.46 PH UNITS
PO2 BLDA: 75.6 MM HG (ref 83–108)
PO2 TEMP ADJ BLD: 75.6 MM HG (ref 83–108)
TOTAL RATE: 0 BREATHS/MINUTE

## 2021-02-24 PROCEDURE — 25010000002 DAPTOMYCIN PER 1 MG: Performed by: PHYSICIAN ASSISTANT

## 2021-02-24 PROCEDURE — 94799 UNLISTED PULMONARY SVC/PX: CPT

## 2021-02-24 PROCEDURE — 36600 WITHDRAWAL OF ARTERIAL BLOOD: CPT

## 2021-02-24 PROCEDURE — 99232 SBSQ HOSP IP/OBS MODERATE 35: CPT | Performed by: HOSPITALIST

## 2021-02-24 PROCEDURE — 25010000002 CEFTRIAXONE PER 250 MG: Performed by: INTERNAL MEDICINE

## 2021-02-24 PROCEDURE — 82375 ASSAY CARBOXYHB QUANT: CPT

## 2021-02-24 PROCEDURE — 76882 US LMTD JT/FCL EVL NVASC XTR: CPT

## 2021-02-24 PROCEDURE — 94660 CPAP INITIATION&MGMT: CPT

## 2021-02-24 PROCEDURE — 99231 SBSQ HOSP IP/OBS SF/LOW 25: CPT | Performed by: PSYCHIATRY & NEUROLOGY

## 2021-02-24 PROCEDURE — 83050 HGB METHEMOGLOBIN QUAN: CPT

## 2021-02-24 PROCEDURE — 82805 BLOOD GASES W/O2 SATURATION: CPT

## 2021-02-24 RX ORDER — HYDROXYZINE HYDROCHLORIDE 25 MG/1
25 TABLET, FILM COATED ORAL 3 TIMES DAILY PRN
Status: DISCONTINUED | OUTPATIENT
Start: 2021-02-24 | End: 2021-02-24

## 2021-02-24 RX ORDER — HYDROXYZINE HYDROCHLORIDE 25 MG/1
25 TABLET, FILM COATED ORAL 3 TIMES DAILY PRN
Status: DISCONTINUED | OUTPATIENT
Start: 2021-02-24 | End: 2021-03-05 | Stop reason: HOSPADM

## 2021-02-24 RX ADMIN — DULOXETINE HYDROCHLORIDE 30 MG: 30 CAPSULE, DELAYED RELEASE ORAL at 09:53

## 2021-02-24 RX ADMIN — BACLOFEN 10 MG: 10 TABLET ORAL at 17:05

## 2021-02-24 RX ADMIN — BACLOFEN 10 MG: 10 TABLET ORAL at 20:05

## 2021-02-24 RX ADMIN — GABAPENTIN 300 MG: 300 CAPSULE ORAL at 20:05

## 2021-02-24 RX ADMIN — HYDROXYZINE HYDROCHLORIDE 25 MG: 25 TABLET, FILM COATED ORAL at 20:05

## 2021-02-24 RX ADMIN — ASPIRIN 81 MG: 81 TABLET, CHEWABLE ORAL at 09:53

## 2021-02-24 RX ADMIN — CEFTRIAXONE SODIUM 2 G: 2 INJECTION, POWDER, FOR SOLUTION INTRAMUSCULAR; INTRAVENOUS at 17:05

## 2021-02-24 RX ADMIN — SODIUM CHLORIDE 650 MG: 9 INJECTION, SOLUTION INTRAVENOUS at 14:53

## 2021-02-24 RX ADMIN — BACLOFEN 10 MG: 10 TABLET ORAL at 09:53

## 2021-02-24 RX ADMIN — SODIUM CHLORIDE, PRESERVATIVE FREE 10 ML: 5 INJECTION INTRAVENOUS at 20:05

## 2021-02-24 RX ADMIN — LEVOTHYROXINE SODIUM 137 MCG: 0.14 TABLET ORAL at 05:34

## 2021-02-24 RX ADMIN — ALLOPURINOL 300 MG: 300 TABLET ORAL at 09:53

## 2021-02-24 RX ADMIN — GABAPENTIN 300 MG: 300 CAPSULE ORAL at 09:53

## 2021-02-24 RX ADMIN — MULTIVITAMIN TABLET 1 TABLET: TABLET at 09:53

## 2021-02-24 RX ADMIN — FAMOTIDINE 20 MG: 20 TABLET, FILM COATED ORAL at 05:34

## 2021-02-24 RX ADMIN — ACETAMINOPHEN 650 MG: 325 TABLET ORAL at 09:59

## 2021-02-24 RX ADMIN — METOPROLOL SUCCINATE 25 MG: 25 TABLET, EXTENDED RELEASE ORAL at 09:53

## 2021-02-24 RX ADMIN — SODIUM CHLORIDE, PRESERVATIVE FREE 10 ML: 5 INJECTION INTRAVENOUS at 09:53

## 2021-02-24 RX ADMIN — FERROUS SULFATE TAB 325 MG (65 MG ELEMENTAL FE) 325 MG: 325 (65 FE) TAB at 09:53

## 2021-02-24 RX ADMIN — HYDROXYZINE HYDROCHLORIDE 25 MG: 25 TABLET, FILM COATED ORAL at 10:24

## 2021-02-24 RX ADMIN — FAMOTIDINE 20 MG: 20 TABLET, FILM COATED ORAL at 17:06

## 2021-02-25 ENCOUNTER — HOSPITAL ENCOUNTER (OUTPATIENT)
Facility: HOSPITAL | Age: 66
Setting detail: SURGERY ADMIT
End: 2021-02-25
Attending: SURGERY | Admitting: SURGERY

## 2021-02-25 LAB
ANION GAP SERPL CALCULATED.3IONS-SCNC: 8 MMOL/L (ref 5–15)
BACTERIA UR QL AUTO: ABNORMAL /HPF
BASOPHILS # BLD AUTO: 0.1 10*3/MM3 (ref 0–0.2)
BASOPHILS NFR BLD AUTO: 1 % (ref 0–1.5)
BILIRUB UR QL STRIP: NEGATIVE
BUN SERPL-MCNC: 12 MG/DL (ref 8–23)
BUN/CREAT SERPL: 16.7 (ref 7–25)
CALCIUM SPEC-SCNC: 9.1 MG/DL (ref 8.6–10.5)
CHLORIDE SERPL-SCNC: 101 MMOL/L (ref 98–107)
CLARITY UR: CLEAR
CO2 SERPL-SCNC: 31 MMOL/L (ref 22–29)
COLOR UR: ABNORMAL
CREAT SERPL-MCNC: 0.72 MG/DL (ref 0.57–1)
DEPRECATED RDW RBC AUTO: 52.6 FL (ref 37–54)
EOSINOPHIL # BLD AUTO: 0.27 10*3/MM3 (ref 0–0.4)
EOSINOPHIL NFR BLD AUTO: 2.8 % (ref 0.3–6.2)
ERYTHROCYTE [DISTWIDTH] IN BLOOD BY AUTOMATED COUNT: 13.2 % (ref 12.3–15.4)
GFR SERPL CREATININE-BSD FRML MDRD: 81 ML/MIN/1.73
GLUCOSE SERPL-MCNC: 90 MG/DL (ref 65–99)
GLUCOSE UR STRIP-MCNC: NEGATIVE MG/DL
HCT VFR BLD AUTO: 49.9 % (ref 34–46.6)
HGB BLD-MCNC: 15.4 G/DL (ref 12–15.9)
HGB UR QL STRIP.AUTO: NEGATIVE
HYALINE CASTS UR QL AUTO: ABNORMAL /LPF
IMM GRANULOCYTES # BLD AUTO: 0.69 10*3/MM3 (ref 0–0.05)
IMM GRANULOCYTES NFR BLD AUTO: 7.2 % (ref 0–0.5)
KETONES UR QL STRIP: NEGATIVE
LEUKOCYTE ESTERASE UR QL STRIP.AUTO: ABNORMAL
LYMPHOCYTES # BLD AUTO: 1.04 10*3/MM3 (ref 0.7–3.1)
LYMPHOCYTES NFR BLD AUTO: 10.9 % (ref 19.6–45.3)
MACROCYTES BLD QL SMEAR: NORMAL
MCH RBC QN AUTO: 32.7 PG (ref 26.6–33)
MCHC RBC AUTO-ENTMCNC: 30.9 G/DL (ref 31.5–35.7)
MCV RBC AUTO: 105.9 FL (ref 79–97)
MONOCYTES # BLD AUTO: 1.02 10*3/MM3 (ref 0.1–0.9)
MONOCYTES NFR BLD AUTO: 10.6 % (ref 5–12)
NEUTROPHILS NFR BLD AUTO: 6.46 10*3/MM3 (ref 1.7–7)
NEUTROPHILS NFR BLD AUTO: 67.5 % (ref 42.7–76)
NITRITE UR QL STRIP: NEGATIVE
NRBC BLD AUTO-RTO: 0 /100 WBC (ref 0–0.2)
PH UR STRIP.AUTO: 6 [PH] (ref 5–8)
PLAT MORPH BLD: NORMAL
PLATELET # BLD AUTO: 209 10*3/MM3 (ref 140–450)
PMV BLD AUTO: 10 FL (ref 6–12)
POTASSIUM SERPL-SCNC: 3.8 MMOL/L (ref 3.5–5.2)
PROT UR QL STRIP: ABNORMAL
RBC # BLD AUTO: 4.71 10*6/MM3 (ref 3.77–5.28)
RBC # UR: ABNORMAL /HPF
REF LAB TEST METHOD: ABNORMAL
SODIUM SERPL-SCNC: 140 MMOL/L (ref 136–145)
SP GR UR STRIP: 1.02 (ref 1–1.03)
SQUAMOUS #/AREA URNS HPF: ABNORMAL /HPF
UROBILINOGEN UR QL STRIP: ABNORMAL
WBC # BLD AUTO: 9.58 10*3/MM3 (ref 3.4–10.8)
WBC MORPH BLD: NORMAL
WBC UR QL AUTO: ABNORMAL /HPF
YEAST URNS QL MICRO: ABNORMAL /HPF

## 2021-02-25 PROCEDURE — 80048 BASIC METABOLIC PNL TOTAL CA: CPT | Performed by: INTERNAL MEDICINE

## 2021-02-25 PROCEDURE — 87086 URINE CULTURE/COLONY COUNT: CPT | Performed by: INTERNAL MEDICINE

## 2021-02-25 PROCEDURE — 25010000002 CEFTRIAXONE PER 250 MG: Performed by: INTERNAL MEDICINE

## 2021-02-25 PROCEDURE — 94799 UNLISTED PULMONARY SVC/PX: CPT

## 2021-02-25 PROCEDURE — 85007 BL SMEAR W/DIFF WBC COUNT: CPT | Performed by: INTERNAL MEDICINE

## 2021-02-25 PROCEDURE — 99233 SBSQ HOSP IP/OBS HIGH 50: CPT | Performed by: INTERNAL MEDICINE

## 2021-02-25 PROCEDURE — 85025 COMPLETE CBC W/AUTO DIFF WBC: CPT | Performed by: INTERNAL MEDICINE

## 2021-02-25 PROCEDURE — 99231 SBSQ HOSP IP/OBS SF/LOW 25: CPT | Performed by: PSYCHIATRY & NEUROLOGY

## 2021-02-25 PROCEDURE — 94660 CPAP INITIATION&MGMT: CPT

## 2021-02-25 PROCEDURE — 81001 URINALYSIS AUTO W/SCOPE: CPT | Performed by: INTERNAL MEDICINE

## 2021-02-25 PROCEDURE — 25010000002 DAPTOMYCIN PER 1 MG: Performed by: PHYSICIAN ASSISTANT

## 2021-02-25 RX ADMIN — SODIUM CHLORIDE 650 MG: 9 INJECTION, SOLUTION INTRAVENOUS at 14:29

## 2021-02-25 RX ADMIN — GABAPENTIN 300 MG: 300 CAPSULE ORAL at 21:30

## 2021-02-25 RX ADMIN — ASPIRIN 81 MG: 81 TABLET, CHEWABLE ORAL at 08:26

## 2021-02-25 RX ADMIN — BACLOFEN 10 MG: 10 TABLET ORAL at 08:27

## 2021-02-25 RX ADMIN — FERROUS SULFATE TAB 325 MG (65 MG ELEMENTAL FE) 325 MG: 325 (65 FE) TAB at 08:26

## 2021-02-25 RX ADMIN — LEVOTHYROXINE SODIUM 137 MCG: 0.14 TABLET ORAL at 05:26

## 2021-02-25 RX ADMIN — SODIUM CHLORIDE, PRESERVATIVE FREE 10 ML: 5 INJECTION INTRAVENOUS at 08:27

## 2021-02-25 RX ADMIN — BACLOFEN 10 MG: 10 TABLET ORAL at 21:30

## 2021-02-25 RX ADMIN — SODIUM CHLORIDE, PRESERVATIVE FREE 10 ML: 5 INJECTION INTRAVENOUS at 21:30

## 2021-02-25 RX ADMIN — FAMOTIDINE 20 MG: 20 TABLET, FILM COATED ORAL at 17:55

## 2021-02-25 RX ADMIN — DULOXETINE HYDROCHLORIDE 30 MG: 30 CAPSULE, DELAYED RELEASE ORAL at 08:27

## 2021-02-25 RX ADMIN — MULTIVITAMIN TABLET 1 TABLET: TABLET at 08:27

## 2021-02-25 RX ADMIN — GABAPENTIN 300 MG: 300 CAPSULE ORAL at 08:26

## 2021-02-25 RX ADMIN — BACLOFEN 10 MG: 10 TABLET ORAL at 16:07

## 2021-02-25 RX ADMIN — HYDROXYZINE HYDROCHLORIDE 25 MG: 25 TABLET, FILM COATED ORAL at 21:30

## 2021-02-25 RX ADMIN — FAMOTIDINE 20 MG: 20 TABLET, FILM COATED ORAL at 08:26

## 2021-02-25 RX ADMIN — ALLOPURINOL 300 MG: 300 TABLET ORAL at 08:26

## 2021-02-25 RX ADMIN — CEFTRIAXONE SODIUM 2 G: 2 INJECTION, POWDER, FOR SOLUTION INTRAMUSCULAR; INTRAVENOUS at 17:56

## 2021-02-26 ENCOUNTER — ANESTHESIA EVENT (OUTPATIENT)
Dept: PERIOP | Facility: HOSPITAL | Age: 66
End: 2021-02-26

## 2021-02-26 ENCOUNTER — ANESTHESIA (OUTPATIENT)
Dept: PERIOP | Facility: HOSPITAL | Age: 66
End: 2021-02-26

## 2021-02-26 LAB
ANION GAP SERPL CALCULATED.3IONS-SCNC: 10 MMOL/L (ref 5–15)
APTT PPP: 35.3 SECONDS (ref 24–37)
BACTERIA SPEC AEROBE CULT: ABNORMAL
BUN SERPL-MCNC: 12 MG/DL (ref 8–23)
BUN/CREAT SERPL: 18.8 (ref 7–25)
CALCIUM SPEC-SCNC: 8.9 MG/DL (ref 8.6–10.5)
CHLORIDE SERPL-SCNC: 102 MMOL/L (ref 98–107)
CO2 SERPL-SCNC: 30 MMOL/L (ref 22–29)
CREAT SERPL-MCNC: 0.64 MG/DL (ref 0.57–1)
DEPRECATED RDW RBC AUTO: 52.9 FL (ref 37–54)
ERYTHROCYTE [DISTWIDTH] IN BLOOD BY AUTOMATED COUNT: 13.4 % (ref 12.3–15.4)
GFR SERPL CREATININE-BSD FRML MDRD: 93 ML/MIN/1.73
GLUCOSE SERPL-MCNC: 88 MG/DL (ref 65–99)
HCT VFR BLD AUTO: 49.4 % (ref 34–46.6)
HGB BLD-MCNC: 15.1 G/DL (ref 12–15.9)
INR PPP: 1.26 (ref 0.85–1.16)
MCH RBC QN AUTO: 32.4 PG (ref 26.6–33)
MCHC RBC AUTO-ENTMCNC: 30.6 G/DL (ref 31.5–35.7)
MCV RBC AUTO: 106 FL (ref 79–97)
PLATELET # BLD AUTO: 241 10*3/MM3 (ref 140–450)
PMV BLD AUTO: 9.9 FL (ref 6–12)
POTASSIUM SERPL-SCNC: 3.7 MMOL/L (ref 3.5–5.2)
PROTHROMBIN TIME: 15.5 SECONDS (ref 11.5–14)
RBC # BLD AUTO: 4.66 10*6/MM3 (ref 3.77–5.28)
SODIUM SERPL-SCNC: 142 MMOL/L (ref 136–145)
WBC # BLD AUTO: 9.81 10*3/MM3 (ref 3.4–10.8)

## 2021-02-26 PROCEDURE — 87070 CULTURE OTHR SPECIMN AEROBIC: CPT | Performed by: SURGERY

## 2021-02-26 PROCEDURE — 25010000002 ONDANSETRON PER 1 MG: Performed by: NURSE ANESTHETIST, CERTIFIED REGISTERED

## 2021-02-26 PROCEDURE — 88312 SPECIAL STAINS GROUP 1: CPT | Performed by: SURGERY

## 2021-02-26 PROCEDURE — 25010000002 PROPOFOL 10 MG/ML EMULSION: Performed by: NURSE ANESTHETIST, CERTIFIED REGISTERED

## 2021-02-26 PROCEDURE — 25010000002 MORPHINE PER 10 MG: Performed by: SURGERY

## 2021-02-26 PROCEDURE — 94799 UNLISTED PULMONARY SVC/PX: CPT

## 2021-02-26 PROCEDURE — 0J9M0ZZ DRAINAGE OF LEFT UPPER LEG SUBCUTANEOUS TISSUE AND FASCIA, OPEN APPROACH: ICD-10-PCS | Performed by: SURGERY

## 2021-02-26 PROCEDURE — 25010000002 FENTANYL CITRATE (PF) 100 MCG/2ML SOLUTION: Performed by: NURSE ANESTHETIST, CERTIFIED REGISTERED

## 2021-02-26 PROCEDURE — 25010000002 DAPTOMYCIN PER 1 MG: Performed by: PHYSICIAN ASSISTANT

## 2021-02-26 PROCEDURE — 97530 THERAPEUTIC ACTIVITIES: CPT

## 2021-02-26 PROCEDURE — 85730 THROMBOPLASTIN TIME PARTIAL: CPT | Performed by: SURGERY

## 2021-02-26 PROCEDURE — 94660 CPAP INITIATION&MGMT: CPT

## 2021-02-26 PROCEDURE — 99232 SBSQ HOSP IP/OBS MODERATE 35: CPT | Performed by: INTERNAL MEDICINE

## 2021-02-26 PROCEDURE — 87075 CULTR BACTERIA EXCEPT BLOOD: CPT | Performed by: SURGERY

## 2021-02-26 PROCEDURE — 25010000003 LIDOCAINE 1 % SOLUTION: Performed by: NURSE ANESTHETIST, CERTIFIED REGISTERED

## 2021-02-26 PROCEDURE — 85027 COMPLETE CBC AUTOMATED: CPT | Performed by: INTERNAL MEDICINE

## 2021-02-26 PROCEDURE — 87205 SMEAR GRAM STAIN: CPT | Performed by: SURGERY

## 2021-02-26 PROCEDURE — 88304 TISSUE EXAM BY PATHOLOGIST: CPT | Performed by: SURGERY

## 2021-02-26 PROCEDURE — 85610 PROTHROMBIN TIME: CPT | Performed by: SURGERY

## 2021-02-26 PROCEDURE — 25010000002 SUCCINYLCHOLINE PER 20 MG: Performed by: NURSE ANESTHETIST, CERTIFIED REGISTERED

## 2021-02-26 PROCEDURE — 87076 CULTURE ANAEROBE IDENT EACH: CPT | Performed by: SURGERY

## 2021-02-26 PROCEDURE — 80048 BASIC METABOLIC PNL TOTAL CA: CPT | Performed by: INTERNAL MEDICINE

## 2021-02-26 PROCEDURE — 97535 SELF CARE MNGMENT TRAINING: CPT

## 2021-02-26 PROCEDURE — 25010000002 CEFTRIAXONE PER 250 MG: Performed by: INTERNAL MEDICINE

## 2021-02-26 RX ORDER — ACETAMINOPHEN 650 MG
TABLET, EXTENDED RELEASE ORAL AS NEEDED
Status: DISCONTINUED | OUTPATIENT
Start: 2021-02-26 | End: 2021-02-26 | Stop reason: HOSPADM

## 2021-02-26 RX ORDER — FENTANYL CITRATE 50 UG/ML
50 INJECTION, SOLUTION INTRAMUSCULAR; INTRAVENOUS
Status: DISCONTINUED | OUTPATIENT
Start: 2021-02-26 | End: 2021-02-26 | Stop reason: HOSPADM

## 2021-02-26 RX ORDER — MORPHINE SULFATE 4 MG/ML
4 INJECTION, SOLUTION INTRAMUSCULAR; INTRAVENOUS EVERY 12 HOURS PRN
Status: DISPENSED | OUTPATIENT
Start: 2021-02-26 | End: 2021-03-05

## 2021-02-26 RX ORDER — SUCCINYLCHOLINE CHLORIDE 20 MG/ML
INJECTION INTRAMUSCULAR; INTRAVENOUS AS NEEDED
Status: DISCONTINUED | OUTPATIENT
Start: 2021-02-26 | End: 2021-02-26 | Stop reason: SURG

## 2021-02-26 RX ORDER — PROPOFOL 10 MG/ML
VIAL (ML) INTRAVENOUS AS NEEDED
Status: DISCONTINUED | OUTPATIENT
Start: 2021-02-26 | End: 2021-02-26 | Stop reason: SURG

## 2021-02-26 RX ORDER — MAGNESIUM HYDROXIDE 1200 MG/15ML
LIQUID ORAL AS NEEDED
Status: DISCONTINUED | OUTPATIENT
Start: 2021-02-26 | End: 2021-02-26 | Stop reason: HOSPADM

## 2021-02-26 RX ORDER — ROCURONIUM BROMIDE 10 MG/ML
INJECTION, SOLUTION INTRAVENOUS AS NEEDED
Status: DISCONTINUED | OUTPATIENT
Start: 2021-02-26 | End: 2021-02-26 | Stop reason: SURG

## 2021-02-26 RX ORDER — PROMETHAZINE HYDROCHLORIDE 25 MG/1
25 TABLET ORAL ONCE AS NEEDED
Status: DISCONTINUED | OUTPATIENT
Start: 2021-02-26 | End: 2021-02-26 | Stop reason: HOSPADM

## 2021-02-26 RX ORDER — CHOLECALCIFEROL (VITAMIN D3) 1250 MCG
50000 CAPSULE ORAL
Status: DISCONTINUED | OUTPATIENT
Start: 2021-02-26 | End: 2021-03-05 | Stop reason: HOSPADM

## 2021-02-26 RX ORDER — ONDANSETRON 2 MG/ML
INJECTION INTRAMUSCULAR; INTRAVENOUS AS NEEDED
Status: DISCONTINUED | OUTPATIENT
Start: 2021-02-26 | End: 2021-02-26 | Stop reason: SURG

## 2021-02-26 RX ORDER — SODIUM CHLORIDE 9 MG/ML
INJECTION, SOLUTION INTRAVENOUS CONTINUOUS PRN
Status: DISCONTINUED | OUTPATIENT
Start: 2021-02-26 | End: 2021-02-26 | Stop reason: SURG

## 2021-02-26 RX ORDER — HYDROMORPHONE HYDROCHLORIDE 1 MG/ML
0.5 INJECTION, SOLUTION INTRAMUSCULAR; INTRAVENOUS; SUBCUTANEOUS
Status: DISCONTINUED | OUTPATIENT
Start: 2021-02-26 | End: 2021-02-26 | Stop reason: HOSPADM

## 2021-02-26 RX ORDER — CHOLECALCIFEROL (VITAMIN D3) 1250 MCG
50000 CAPSULE ORAL
Status: DISCONTINUED | OUTPATIENT
Start: 2021-02-26 | End: 2021-02-26

## 2021-02-26 RX ORDER — LIDOCAINE HYDROCHLORIDE 10 MG/ML
INJECTION, SOLUTION INFILTRATION; PERINEURAL AS NEEDED
Status: DISCONTINUED | OUTPATIENT
Start: 2021-02-26 | End: 2021-02-26 | Stop reason: SURG

## 2021-02-26 RX ORDER — FLUCONAZOLE 200 MG/1
200 TABLET ORAL EVERY 24 HOURS
Status: DISCONTINUED | OUTPATIENT
Start: 2021-02-26 | End: 2021-03-05 | Stop reason: HOSPADM

## 2021-02-26 RX ORDER — PROMETHAZINE HYDROCHLORIDE 25 MG/1
25 SUPPOSITORY RECTAL ONCE AS NEEDED
Status: DISCONTINUED | OUTPATIENT
Start: 2021-02-26 | End: 2021-02-26 | Stop reason: HOSPADM

## 2021-02-26 RX ADMIN — FERROUS SULFATE TAB 325 MG (65 MG ELEMENTAL FE) 325 MG: 325 (65 FE) TAB at 09:42

## 2021-02-26 RX ADMIN — BACLOFEN 10 MG: 10 TABLET ORAL at 09:42

## 2021-02-26 RX ADMIN — METOPROLOL SUCCINATE 25 MG: 25 TABLET, EXTENDED RELEASE ORAL at 09:43

## 2021-02-26 RX ADMIN — SODIUM CHLORIDE: 9 INJECTION, SOLUTION INTRAVENOUS at 15:05

## 2021-02-26 RX ADMIN — Medication 50000 UNITS: at 17:41

## 2021-02-26 RX ADMIN — Medication 200 MG: at 15:10

## 2021-02-26 RX ADMIN — BACLOFEN 10 MG: 10 TABLET ORAL at 21:38

## 2021-02-26 RX ADMIN — ONDANSETRON 4 MG: 2 INJECTION INTRAMUSCULAR; INTRAVENOUS at 15:37

## 2021-02-26 RX ADMIN — SODIUM CHLORIDE, PRESERVATIVE FREE 10 ML: 5 INJECTION INTRAVENOUS at 09:46

## 2021-02-26 RX ADMIN — PROPOFOL 200 MG: 10 INJECTION, EMULSION INTRAVENOUS at 15:10

## 2021-02-26 RX ADMIN — HYDROXYZINE HYDROCHLORIDE 25 MG: 25 TABLET, FILM COATED ORAL at 21:37

## 2021-02-26 RX ADMIN — LIDOCAINE HYDROCHLORIDE 100 MG: 10 INJECTION, SOLUTION INFILTRATION; PERINEURAL at 15:10

## 2021-02-26 RX ADMIN — ALLOPURINOL 300 MG: 300 TABLET ORAL at 09:43

## 2021-02-26 RX ADMIN — FAMOTIDINE 20 MG: 20 TABLET, FILM COATED ORAL at 09:42

## 2021-02-26 RX ADMIN — FAMOTIDINE 20 MG: 20 TABLET, FILM COATED ORAL at 17:40

## 2021-02-26 RX ADMIN — DULOXETINE HYDROCHLORIDE 30 MG: 30 CAPSULE, DELAYED RELEASE ORAL at 09:43

## 2021-02-26 RX ADMIN — BACLOFEN 10 MG: 10 TABLET ORAL at 17:40

## 2021-02-26 RX ADMIN — LEVOTHYROXINE SODIUM 137 MCG: 0.14 TABLET ORAL at 05:50

## 2021-02-26 RX ADMIN — SODIUM CHLORIDE, PRESERVATIVE FREE 10 ML: 5 INJECTION INTRAVENOUS at 21:38

## 2021-02-26 RX ADMIN — ASPIRIN 81 MG: 81 TABLET, CHEWABLE ORAL at 09:43

## 2021-02-26 RX ADMIN — MORPHINE SULFATE 4 MG: 4 INJECTION, SOLUTION INTRAMUSCULAR; INTRAVENOUS at 22:38

## 2021-02-26 RX ADMIN — FENTANYL CITRATE 25 MCG: 50 INJECTION, SOLUTION INTRAMUSCULAR; INTRAVENOUS at 16:28

## 2021-02-26 RX ADMIN — FLUCONAZOLE 200 MG: 200 TABLET ORAL at 09:43

## 2021-02-26 RX ADMIN — GABAPENTIN 300 MG: 300 CAPSULE ORAL at 21:38

## 2021-02-26 RX ADMIN — FENTANYL CITRATE 25 MCG: 50 INJECTION, SOLUTION INTRAMUSCULAR; INTRAVENOUS at 16:00

## 2021-02-26 RX ADMIN — SODIUM CHLORIDE 650 MG: 9 INJECTION, SOLUTION INTRAVENOUS at 12:37

## 2021-02-26 RX ADMIN — OXYCODONE HYDROCHLORIDE AND ACETAMINOPHEN 1 TABLET: 5; 325 TABLET ORAL at 21:37

## 2021-02-26 RX ADMIN — CEFTRIAXONE SODIUM 2 G: 2 INJECTION, POWDER, FOR SOLUTION INTRAMUSCULAR; INTRAVENOUS at 17:40

## 2021-02-26 RX ADMIN — MULTIVITAMIN TABLET 1 TABLET: TABLET at 09:43

## 2021-02-26 RX ADMIN — ROCURONIUM BROMIDE 10 MG: 10 INJECTION INTRAVENOUS at 15:10

## 2021-02-26 RX ADMIN — GABAPENTIN 300 MG: 300 CAPSULE ORAL at 09:42

## 2021-02-26 NOTE — ANESTHESIA PROCEDURE NOTES
Right hand 20 G Peripheral IV    Patient location during procedure: OR  Line placed for Fluids/Medication Admin.  Performed By   Anesthesiologist: Darin Casillas MD  Preanesthetic Checklist  Completed: patient identified, site marked, surgical consent, pre-op evaluation, timeout performed, IV checked, risks and benefits discussed and monitors and equipment checked  Peripheral IV Prep   Patient position: supine   Prep: alcohol swabs  Patient monitoring: heart rate, cardiac monitor and continuous pulse ox  Peripheral IV Procedure   Laterality:right  Location:  Hand  Catheter size: 20 G         Post Assessment   Dressing Type: transparent.    IV Dressing/Site: clean, dry and intact

## 2021-02-26 NOTE — ANESTHESIA PREPROCEDURE EVALUATION
Anesthesia Evaluation     Patient summary reviewed and Nursing notes reviewed                Airway   Mallampati: II  TM distance: >3 FB  Neck ROM: full  No difficulty expected  Dental - normal exam     Pulmonary - normal exam   (+) a smoker Current,   Cardiovascular - normal exam    (+) hypertension, DVT,       Neuro/Psych  (+) psychiatric history Anxiety,     GI/Hepatic/Renal/Endo    (+) morbid obesity,  renal disease ARF and CRI, thyroid problem hypothyroidism    ROS Comment: IBS    Musculoskeletal     (+) chronic pain (CHRONIC JOINT PAIN),   Abdominal  - normal exam    Bowel sounds: normal.   Substance History - negative use     OB/GYN negative ob/gyn ROS         Other   arthritis (RHEUMATOID),        Other Comment: CELLULITIS, SEPSIS  SJOGREN'S DZ                  Anesthesia Plan    ASA 3     general     intravenous induction     Anesthetic plan, all risks, benefits, and alternatives have been provided, discussed and informed consent has been obtained with: patient.    Plan discussed with CRNA.

## 2021-02-26 NOTE — ADDENDUM NOTE
Addendum  created 02/26/21 1602 by Ida Norman, ROMMEL    Flowsheet accepted, Intraprocedure Event edited, Intraprocedure Flowsheets edited, Intraprocedure Meds edited

## 2021-02-26 NOTE — ANESTHESIA POSTPROCEDURE EVALUATION
Patient: Raquel Carranza    Procedure Summary     Date: 02/26/21 Room / Location:  [facility]    Anesthesia Start: 1457 Anesthesia Stop:     Procedure: INCISION AND DRAINAGE LEFT THIGH ABCESS (Left ) Diagnosis:     Surgeon: Noe Ken MD Provider: Darin Casillas MD    Anesthesia Type: general ASA Status: 3          Anesthesia Type: general    Vitals  Vitals Value Taken Time   /60 02/26/21 1559   Temp 98.6 °F (37 °C) 02/26/21 1559   Pulse 65 02/26/21 1559   Resp 12 02/26/21 1559   SpO2 92 % 02/26/21 1559           Post Anesthesia Care and Evaluation    Patient location during evaluation: PACU  Patient participation: complete - patient participated  Level of consciousness: awake and alert  Pain score: 6  Pain management: inadequate  Airway patency: patent  Anesthetic complications: No anesthetic complications  PONV Status: none  Cardiovascular status: stable  Respiratory status: acceptable, spontaneous ventilation and face mask  Hydration status: stable    Comments: Pt transferred to PACU with O2. Vital signs stable. Report to PACU RN and care accepted.

## 2021-02-26 NOTE — ANESTHESIA PROCEDURE NOTES
Airway  Urgency: elective    Date/Time: 2/26/2021 3:10 PM  Airway not difficult    General Information and Staff    Patient location during procedure: OR  CRNA: Ida Norman CRNA    Indications and Patient Condition  Indications for airway management: airway protection    Preoxygenated: yes  MILS maintained throughout  Mask difficulty assessment: 0 - not attempted    Final Airway Details  Final airway type: endotracheal airway      Successful airway: ETT  Cuffed: yes   Successful intubation technique: direct laryngoscopy  Facilitating devices/methods: intubating stylet and cricoid pressure  Endotracheal tube insertion site: oral  Blade: Pierson  Blade size: 2  ETT size (mm): 7.5  Cormack-Lehane Classification: grade I - full view of glottis  Placement verified by: chest auscultation and capnometry   Cuff volume (mL): 6  Measured from: lips  ETT/EBT  to lips (cm): 21  Number of attempts at approach: 1  Assessment: lips, teeth, and gum same as pre-op and atraumatic intubation    Additional Comments  Pt to OR 5. Pt moved self to OR table. ASA monitors placed. Pre-O2 with 100% oxygen. SIVI with RSI. Atraumatic intubation. +ETCO2. +BBS.

## 2021-02-27 ENCOUNTER — ANESTHESIA EVENT (OUTPATIENT)
Dept: PERIOP | Facility: HOSPITAL | Age: 66
End: 2021-02-27

## 2021-02-27 ENCOUNTER — ANESTHESIA (OUTPATIENT)
Dept: PERIOP | Facility: HOSPITAL | Age: 66
End: 2021-02-27

## 2021-02-27 LAB
ANION GAP SERPL CALCULATED.3IONS-SCNC: 6 MMOL/L (ref 5–15)
BUN SERPL-MCNC: 10 MG/DL (ref 8–23)
BUN/CREAT SERPL: 13.3 (ref 7–25)
CALCIUM SPEC-SCNC: 9.3 MG/DL (ref 8.6–10.5)
CHLORIDE SERPL-SCNC: 102 MMOL/L (ref 98–107)
CK SERPL-CCNC: 21 U/L (ref 20–180)
CO2 SERPL-SCNC: 31 MMOL/L (ref 22–29)
CREAT SERPL-MCNC: 0.75 MG/DL (ref 0.57–1)
DEPRECATED RDW RBC AUTO: 53.5 FL (ref 37–54)
ERYTHROCYTE [DISTWIDTH] IN BLOOD BY AUTOMATED COUNT: 13.2 % (ref 12.3–15.4)
GFR SERPL CREATININE-BSD FRML MDRD: 77 ML/MIN/1.73
GLUCOSE SERPL-MCNC: 82 MG/DL (ref 65–99)
HCT VFR BLD AUTO: 43 % (ref 34–46.6)
HCT VFR BLD AUTO: 50.2 % (ref 34–46.6)
HGB BLD-MCNC: 13.2 G/DL (ref 12–15.9)
HGB BLD-MCNC: 15.3 G/DL (ref 12–15.9)
MCH RBC QN AUTO: 33.3 PG (ref 26.6–33)
MCHC RBC AUTO-ENTMCNC: 30.5 G/DL (ref 31.5–35.7)
MCV RBC AUTO: 109.1 FL (ref 79–97)
PLATELET # BLD AUTO: 223 10*3/MM3 (ref 140–450)
PMV BLD AUTO: 9.3 FL (ref 6–12)
POTASSIUM SERPL-SCNC: 4.2 MMOL/L (ref 3.5–5.2)
RBC # BLD AUTO: 4.6 10*6/MM3 (ref 3.77–5.28)
SODIUM SERPL-SCNC: 139 MMOL/L (ref 136–145)
WBC # BLD AUTO: 11.7 10*3/MM3 (ref 3.4–10.8)

## 2021-02-27 PROCEDURE — 85018 HEMOGLOBIN: CPT | Performed by: PHYSICIAN ASSISTANT

## 2021-02-27 PROCEDURE — 87205 SMEAR GRAM STAIN: CPT | Performed by: SURGERY

## 2021-02-27 PROCEDURE — 25010000002 DAPTOMYCIN PER 1 MG: Performed by: SURGERY

## 2021-02-27 PROCEDURE — 94799 UNLISTED PULMONARY SVC/PX: CPT

## 2021-02-27 PROCEDURE — 25010000002 MORPHINE PER 10 MG: Performed by: SURGERY

## 2021-02-27 PROCEDURE — 0JBM0ZZ EXCISION OF LEFT UPPER LEG SUBCUTANEOUS TISSUE AND FASCIA, OPEN APPROACH: ICD-10-PCS | Performed by: SURGERY

## 2021-02-27 PROCEDURE — 87075 CULTR BACTERIA EXCEPT BLOOD: CPT | Performed by: SURGERY

## 2021-02-27 PROCEDURE — 99233 SBSQ HOSP IP/OBS HIGH 50: CPT | Performed by: INTERNAL MEDICINE

## 2021-02-27 PROCEDURE — 85027 COMPLETE CBC AUTOMATED: CPT | Performed by: SURGERY

## 2021-02-27 PROCEDURE — 80048 BASIC METABOLIC PNL TOTAL CA: CPT | Performed by: SURGERY

## 2021-02-27 PROCEDURE — 94660 CPAP INITIATION&MGMT: CPT

## 2021-02-27 PROCEDURE — 25010000002 PROPOFOL 10 MG/ML EMULSION: Performed by: NURSE ANESTHETIST, CERTIFIED REGISTERED

## 2021-02-27 PROCEDURE — 25010000002 ONDANSETRON PER 1 MG: Performed by: NURSE ANESTHETIST, CERTIFIED REGISTERED

## 2021-02-27 PROCEDURE — 87015 SPECIMEN INFECT AGNT CONCNTJ: CPT | Performed by: SURGERY

## 2021-02-27 PROCEDURE — 87116 MYCOBACTERIA CULTURE: CPT | Performed by: SURGERY

## 2021-02-27 PROCEDURE — 85014 HEMATOCRIT: CPT | Performed by: PHYSICIAN ASSISTANT

## 2021-02-27 PROCEDURE — 87070 CULTURE OTHR SPECIMN AEROBIC: CPT | Performed by: SURGERY

## 2021-02-27 PROCEDURE — 87206 SMEAR FLUORESCENT/ACID STAI: CPT | Performed by: SURGERY

## 2021-02-27 PROCEDURE — 25010000002 FENTANYL CITRATE (PF) 100 MCG/2ML SOLUTION: Performed by: NURSE ANESTHETIST, CERTIFIED REGISTERED

## 2021-02-27 PROCEDURE — 87176 TISSUE HOMOGENIZATION CULTR: CPT | Performed by: SURGERY

## 2021-02-27 PROCEDURE — 82550 ASSAY OF CK (CPK): CPT | Performed by: SURGERY

## 2021-02-27 PROCEDURE — 25010000002 SUCCINYLCHOLINE PER 20 MG: Performed by: NURSE ANESTHETIST, CERTIFIED REGISTERED

## 2021-02-27 PROCEDURE — 25010000002 CEFTRIAXONE PER 250 MG: Performed by: SURGERY

## 2021-02-27 PROCEDURE — 87102 FUNGUS ISOLATION CULTURE: CPT | Performed by: SURGERY

## 2021-02-27 RX ORDER — FENTANYL CITRATE 50 UG/ML
INJECTION, SOLUTION INTRAMUSCULAR; INTRAVENOUS AS NEEDED
Status: DISCONTINUED | OUTPATIENT
Start: 2021-02-27 | End: 2021-02-27 | Stop reason: SURG

## 2021-02-27 RX ORDER — ONDANSETRON 2 MG/ML
INJECTION INTRAMUSCULAR; INTRAVENOUS AS NEEDED
Status: DISCONTINUED | OUTPATIENT
Start: 2021-02-27 | End: 2021-02-27 | Stop reason: SURG

## 2021-02-27 RX ORDER — SODIUM HYPOCHLORITE 1.25 MG/ML
SOLUTION TOPICAL EVERY 12 HOURS SCHEDULED
Status: DISCONTINUED | OUTPATIENT
Start: 2021-02-27 | End: 2021-03-05 | Stop reason: HOSPADM

## 2021-02-27 RX ORDER — ROCURONIUM BROMIDE 10 MG/ML
INJECTION, SOLUTION INTRAVENOUS AS NEEDED
Status: DISCONTINUED | OUTPATIENT
Start: 2021-02-27 | End: 2021-02-27 | Stop reason: SURG

## 2021-02-27 RX ORDER — PROPOFOL 10 MG/ML
VIAL (ML) INTRAVENOUS AS NEEDED
Status: DISCONTINUED | OUTPATIENT
Start: 2021-02-27 | End: 2021-02-27 | Stop reason: SURG

## 2021-02-27 RX ORDER — DOCUSATE SODIUM 100 MG/1
100 CAPSULE, LIQUID FILLED ORAL 2 TIMES DAILY
Status: DISCONTINUED | OUTPATIENT
Start: 2021-02-27 | End: 2021-03-05 | Stop reason: HOSPADM

## 2021-02-27 RX ORDER — IPRATROPIUM BROMIDE AND ALBUTEROL SULFATE 2.5; .5 MG/3ML; MG/3ML
3 SOLUTION RESPIRATORY (INHALATION) ONCE AS NEEDED
Status: DISCONTINUED | OUTPATIENT
Start: 2021-02-27 | End: 2021-02-27 | Stop reason: HOSPADM

## 2021-02-27 RX ORDER — SODIUM CHLORIDE 9 MG/ML
INJECTION, SOLUTION INTRAVENOUS CONTINUOUS PRN
Status: DISCONTINUED | OUTPATIENT
Start: 2021-02-27 | End: 2021-02-27 | Stop reason: SURG

## 2021-02-27 RX ORDER — MAGNESIUM HYDROXIDE 1200 MG/15ML
LIQUID ORAL AS NEEDED
Status: DISCONTINUED | OUTPATIENT
Start: 2021-02-27 | End: 2021-02-27 | Stop reason: HOSPADM

## 2021-02-27 RX ORDER — FENTANYL CITRATE 50 UG/ML
50 INJECTION, SOLUTION INTRAMUSCULAR; INTRAVENOUS
Status: DISCONTINUED | OUTPATIENT
Start: 2021-02-27 | End: 2021-02-27 | Stop reason: HOSPADM

## 2021-02-27 RX ORDER — EPHEDRINE SULFATE 50 MG/ML
INJECTION, SOLUTION INTRAVENOUS AS NEEDED
Status: DISCONTINUED | OUTPATIENT
Start: 2021-02-27 | End: 2021-02-27 | Stop reason: SURG

## 2021-02-27 RX ORDER — LIDOCAINE HYDROCHLORIDE 10 MG/ML
INJECTION, SOLUTION EPIDURAL; INFILTRATION; INTRACAUDAL; PERINEURAL AS NEEDED
Status: DISCONTINUED | OUTPATIENT
Start: 2021-02-27 | End: 2021-02-27 | Stop reason: SURG

## 2021-02-27 RX ORDER — SODIUM CHLORIDE, SODIUM LACTATE, POTASSIUM CHLORIDE, CALCIUM CHLORIDE 600; 310; 30; 20 MG/100ML; MG/100ML; MG/100ML; MG/100ML
9 INJECTION, SOLUTION INTRAVENOUS CONTINUOUS
Status: DISCONTINUED | OUTPATIENT
Start: 2021-02-27 | End: 2021-03-01

## 2021-02-27 RX ORDER — BUPIVACAINE HCL/0.9 % NACL/PF 0.125 %
PLASTIC BAG, INJECTION (ML) EPIDURAL AS NEEDED
Status: DISCONTINUED | OUTPATIENT
Start: 2021-02-27 | End: 2021-02-27 | Stop reason: SURG

## 2021-02-27 RX ORDER — SUCCINYLCHOLINE CHLORIDE 20 MG/ML
INJECTION INTRAMUSCULAR; INTRAVENOUS AS NEEDED
Status: DISCONTINUED | OUTPATIENT
Start: 2021-02-27 | End: 2021-02-27 | Stop reason: SURG

## 2021-02-27 RX ADMIN — BACLOFEN 10 MG: 10 TABLET ORAL at 16:32

## 2021-02-27 RX ADMIN — SODIUM CHLORIDE, PRESERVATIVE FREE 10 ML: 5 INJECTION INTRAVENOUS at 21:49

## 2021-02-27 RX ADMIN — Medication 100 MCG: at 13:57

## 2021-02-27 RX ADMIN — FAMOTIDINE 20 MG: 20 TABLET, FILM COATED ORAL at 09:04

## 2021-02-27 RX ADMIN — ONDANSETRON 4 MG: 2 INJECTION INTRAMUSCULAR; INTRAVENOUS at 14:24

## 2021-02-27 RX ADMIN — CEFTRIAXONE SODIUM 2 G: 2 INJECTION, POWDER, FOR SOLUTION INTRAMUSCULAR; INTRAVENOUS at 16:32

## 2021-02-27 RX ADMIN — BACLOFEN 10 MG: 10 TABLET ORAL at 09:05

## 2021-02-27 RX ADMIN — FENTANYL CITRATE 50 MCG: 50 INJECTION, SOLUTION INTRAMUSCULAR; INTRAVENOUS at 14:37

## 2021-02-27 RX ADMIN — GABAPENTIN 300 MG: 300 CAPSULE ORAL at 09:03

## 2021-02-27 RX ADMIN — SODIUM CHLORIDE 650 MG: 9 INJECTION, SOLUTION INTRAVENOUS at 16:32

## 2021-02-27 RX ADMIN — EPHEDRINE SULFATE 10 MG: 50 INJECTION, SOLUTION INTRAVENOUS at 13:48

## 2021-02-27 RX ADMIN — HYDROXYZINE HYDROCHLORIDE 25 MG: 25 TABLET, FILM COATED ORAL at 21:48

## 2021-02-27 RX ADMIN — SODIUM CHLORIDE: 9 INJECTION, SOLUTION INTRAVENOUS at 13:34

## 2021-02-27 RX ADMIN — ALLOPURINOL 300 MG: 300 TABLET ORAL at 09:04

## 2021-02-27 RX ADMIN — OXYCODONE HYDROCHLORIDE AND ACETAMINOPHEN 1 TABLET: 5; 325 TABLET ORAL at 06:36

## 2021-02-27 RX ADMIN — SODIUM CHLORIDE, POTASSIUM CHLORIDE, SODIUM LACTATE AND CALCIUM CHLORIDE 9 ML/HR: 600; 310; 30; 20 INJECTION, SOLUTION INTRAVENOUS at 13:25

## 2021-02-27 RX ADMIN — Medication 200 MG: at 13:46

## 2021-02-27 RX ADMIN — MULTIVITAMIN TABLET 1 TABLET: TABLET at 09:04

## 2021-02-27 RX ADMIN — LEVOTHYROXINE SODIUM 137 MCG: 0.14 TABLET ORAL at 05:44

## 2021-02-27 RX ADMIN — ASPIRIN 81 MG: 81 TABLET, CHEWABLE ORAL at 09:03

## 2021-02-27 RX ADMIN — SODIUM CHLORIDE 500 ML: 9 INJECTION, SOLUTION INTRAVENOUS at 19:55

## 2021-02-27 RX ADMIN — FLUCONAZOLE 200 MG: 200 TABLET ORAL at 09:05

## 2021-02-27 RX ADMIN — PROPOFOL 200 MG: 10 INJECTION, EMULSION INTRAVENOUS at 13:46

## 2021-02-27 RX ADMIN — GABAPENTIN 300 MG: 300 CAPSULE ORAL at 21:48

## 2021-02-27 RX ADMIN — LIDOCAINE HYDROCHLORIDE 50 MG: 10 INJECTION, SOLUTION EPIDURAL; INFILTRATION; INTRACAUDAL; PERINEURAL at 13:46

## 2021-02-27 RX ADMIN — EPHEDRINE SULFATE 20 MG: 50 INJECTION, SOLUTION INTRAVENOUS at 13:57

## 2021-02-27 RX ADMIN — EPHEDRINE SULFATE 10 MG: 50 INJECTION, SOLUTION INTRAVENOUS at 13:53

## 2021-02-27 RX ADMIN — SODIUM HYPOCHLORITE 5 ML: 1.25 SOLUTION TOPICAL at 06:12

## 2021-02-27 RX ADMIN — MORPHINE SULFATE 4 MG: 4 INJECTION, SOLUTION INTRAMUSCULAR; INTRAVENOUS at 10:27

## 2021-02-27 RX ADMIN — EPHEDRINE SULFATE 10 MG: 50 INJECTION, SOLUTION INTRAVENOUS at 13:50

## 2021-02-27 RX ADMIN — BACLOFEN 10 MG: 10 TABLET ORAL at 21:48

## 2021-02-27 RX ADMIN — DULOXETINE HYDROCHLORIDE 30 MG: 30 CAPSULE, DELAYED RELEASE ORAL at 09:04

## 2021-02-27 RX ADMIN — DOCUSATE SODIUM 100 MG: 100 CAPSULE, LIQUID FILLED ORAL at 21:48

## 2021-02-27 RX ADMIN — FENTANYL CITRATE 50 MCG: 50 INJECTION, SOLUTION INTRAMUSCULAR; INTRAVENOUS at 13:46

## 2021-02-27 RX ADMIN — SODIUM CHLORIDE, PRESERVATIVE FREE 10 ML: 5 INJECTION INTRAVENOUS at 10:27

## 2021-02-27 RX ADMIN — ROCURONIUM BROMIDE 5 MG: 10 INJECTION INTRAVENOUS at 13:46

## 2021-02-27 RX ADMIN — FERROUS SULFATE TAB 325 MG (65 MG ELEMENTAL FE) 325 MG: 325 (65 FE) TAB at 09:04

## 2021-02-27 NOTE — ANESTHESIA POSTPROCEDURE EVALUATION
Patient: Raquel Carranza    Procedure Summary     Date: 02/27/21 Room / Location:  [facility]    Anesthesia Start: 1334 Anesthesia Stop:     Procedure: WOUND EXPLORATION LEFT THIGH FOR CONTROL OF BLEEDING (Left ) Diagnosis:     Surgeon: Sreedhar Bobby MD Provider: Tony Mcdonough MD    Anesthesia Type: general ASA Status: 3 - Emergent          Anesthesia Type: general    Vitals  Vitals Value Taken Time   BP     Temp     Pulse     Resp     SpO2 91 % 02/27/21 1439   Vitals shown include unvalidated device data.        Post Anesthesia Care and Evaluation    Patient location during evaluation: PACU  Patient participation: complete - patient participated  Level of consciousness: awake and alert  Pain management: adequate  Airway patency: patent  Anesthetic complications: No anesthetic complications  PONV Status: none  Cardiovascular status: hemodynamically stable and acceptable  Respiratory status: nonlabored ventilation, acceptable and nasal cannula  Hydration status: acceptable

## 2021-02-27 NOTE — ANESTHESIA PROCEDURE NOTES
Airway  Urgency: elective    Date/Time: 2/27/2021 1:47 PM  Airway not difficult    General Information and Staff    Patient location during procedure: OR  CRNA: Gato Shields CRNA    Indications and Patient Condition  Indications for airway management: airway protection    Preoxygenated: yes  MILS not maintained throughout  Mask difficulty assessment: 0 - not attempted    Final Airway Details  Final airway type: endotracheal airway      Successful airway: ETT  Cuffed: yes   Successful intubation technique: video laryngoscopy  Endotracheal tube insertion site: oral  Blade: Walker  Blade size: 3  ETT size (mm): 7.5  Cormack-Lehane Classification: grade I - full view of glottis  Placement verified by: chest auscultation and capnometry   Cuff volume (mL): 8  Measured from: lips  ETT/EBT  to lips (cm): 20  Number of attempts at approach: 1  Assessment: lips, teeth, and gum same as pre-op and atraumatic intubation    Additional Comments  Negative epigastric sounds, Breath sound equal bilaterally with symmetric chest rise and fall

## 2021-02-27 NOTE — ANESTHESIA PREPROCEDURE EVALUATION
Anesthesia Evaluation     Patient summary reviewed and Nursing notes reviewed   no history of anesthetic complications:  NPO Solid Status: > 8 hours  NPO Liquid Status: > 8 hours           Airway   Mallampati: III  TM distance: >3 FB  Neck ROM: full  No difficulty expected  Dental      Pulmonary - negative pulmonary ROS   Cardiovascular - normal exam    (+) hypertension,       Neuro/Psych  (+) psychiatric history,     GI/Hepatic/Renal/Endo    (+)   renal disease,     Musculoskeletal     Abdominal    Substance History      OB/GYN          Other   arthritis,                      Anesthesia Plan    ASA 3 - emergent     general     intravenous induction     Anesthetic plan, all risks, benefits, and alternatives have been provided, discussed and informed consent has been obtained with: patient.    Plan discussed with CRNA.

## 2021-02-28 LAB
ANION GAP SERPL CALCULATED.3IONS-SCNC: 7 MMOL/L (ref 5–15)
BUN SERPL-MCNC: 10 MG/DL (ref 8–23)
BUN/CREAT SERPL: 13.5 (ref 7–25)
CALCIUM SPEC-SCNC: 8.8 MG/DL (ref 8.6–10.5)
CHLORIDE SERPL-SCNC: 103 MMOL/L (ref 98–107)
CO2 SERPL-SCNC: 29 MMOL/L (ref 22–29)
CREAT SERPL-MCNC: 0.74 MG/DL (ref 0.57–1)
DEPRECATED RDW RBC AUTO: 52.5 FL (ref 37–54)
ERYTHROCYTE [DISTWIDTH] IN BLOOD BY AUTOMATED COUNT: 13.2 % (ref 12.3–15.4)
GFR SERPL CREATININE-BSD FRML MDRD: 78 ML/MIN/1.73
GLUCOSE SERPL-MCNC: 90 MG/DL (ref 65–99)
HCT VFR BLD AUTO: 40.4 % (ref 34–46.6)
HGB BLD-MCNC: 12.2 G/DL (ref 12–15.9)
MCH RBC QN AUTO: 32.4 PG (ref 26.6–33)
MCHC RBC AUTO-ENTMCNC: 30.2 G/DL (ref 31.5–35.7)
MCV RBC AUTO: 107.4 FL (ref 79–97)
PLATELET # BLD AUTO: 227 10*3/MM3 (ref 140–450)
PMV BLD AUTO: 9.5 FL (ref 6–12)
POTASSIUM SERPL-SCNC: 4.4 MMOL/L (ref 3.5–5.2)
RBC # BLD AUTO: 3.76 10*6/MM3 (ref 3.77–5.28)
SODIUM SERPL-SCNC: 139 MMOL/L (ref 136–145)
WBC # BLD AUTO: 12.63 10*3/MM3 (ref 3.4–10.8)

## 2021-02-28 PROCEDURE — 94799 UNLISTED PULMONARY SVC/PX: CPT

## 2021-02-28 PROCEDURE — 85027 COMPLETE CBC AUTOMATED: CPT | Performed by: SURGERY

## 2021-02-28 PROCEDURE — 25010000002 CEFTRIAXONE PER 250 MG: Performed by: SURGERY

## 2021-02-28 PROCEDURE — 25010000002 DAPTOMYCIN PER 1 MG: Performed by: SURGERY

## 2021-02-28 PROCEDURE — 94660 CPAP INITIATION&MGMT: CPT

## 2021-02-28 PROCEDURE — 80048 BASIC METABOLIC PNL TOTAL CA: CPT | Performed by: SURGERY

## 2021-02-28 PROCEDURE — 99233 SBSQ HOSP IP/OBS HIGH 50: CPT | Performed by: INTERNAL MEDICINE

## 2021-02-28 RX ORDER — SODIUM CHLORIDE 9 MG/ML
100 INJECTION, SOLUTION INTRAVENOUS CONTINUOUS
Status: DISCONTINUED | OUTPATIENT
Start: 2021-02-28 | End: 2021-03-01

## 2021-02-28 RX ADMIN — ACETAMINOPHEN 650 MG: 325 TABLET ORAL at 16:14

## 2021-02-28 RX ADMIN — SODIUM CHLORIDE 100 ML/HR: 9 INJECTION, SOLUTION INTRAVENOUS at 10:55

## 2021-02-28 RX ADMIN — SODIUM CHLORIDE, PRESERVATIVE FREE 10 ML: 5 INJECTION INTRAVENOUS at 08:48

## 2021-02-28 RX ADMIN — ALLOPURINOL 300 MG: 300 TABLET ORAL at 08:48

## 2021-02-28 RX ADMIN — FAMOTIDINE 20 MG: 20 TABLET, FILM COATED ORAL at 17:45

## 2021-02-28 RX ADMIN — FLUCONAZOLE 200 MG: 200 TABLET ORAL at 08:48

## 2021-02-28 RX ADMIN — GABAPENTIN 300 MG: 300 CAPSULE ORAL at 21:00

## 2021-02-28 RX ADMIN — DOCUSATE SODIUM 100 MG: 100 CAPSULE, LIQUID FILLED ORAL at 21:00

## 2021-02-28 RX ADMIN — DULOXETINE HYDROCHLORIDE 30 MG: 30 CAPSULE, DELAYED RELEASE ORAL at 08:48

## 2021-02-28 RX ADMIN — SODIUM CHLORIDE 500 ML: 9 INJECTION, SOLUTION INTRAVENOUS at 00:20

## 2021-02-28 RX ADMIN — CEFTRIAXONE SODIUM 2 G: 2 INJECTION, POWDER, FOR SOLUTION INTRAMUSCULAR; INTRAVENOUS at 16:15

## 2021-02-28 RX ADMIN — BACLOFEN 10 MG: 10 TABLET ORAL at 21:00

## 2021-02-28 RX ADMIN — LEVOTHYROXINE SODIUM 137 MCG: 0.14 TABLET ORAL at 05:38

## 2021-02-28 RX ADMIN — SODIUM CHLORIDE 100 ML/HR: 9 INJECTION, SOLUTION INTRAVENOUS at 22:43

## 2021-02-28 RX ADMIN — FAMOTIDINE 20 MG: 20 TABLET, FILM COATED ORAL at 08:48

## 2021-02-28 RX ADMIN — FERROUS SULFATE TAB 325 MG (65 MG ELEMENTAL FE) 325 MG: 325 (65 FE) TAB at 08:48

## 2021-02-28 RX ADMIN — HYDROXYZINE HYDROCHLORIDE 25 MG: 25 TABLET, FILM COATED ORAL at 21:00

## 2021-02-28 RX ADMIN — MULTIVITAMIN TABLET 1 TABLET: TABLET at 08:48

## 2021-02-28 RX ADMIN — BACLOFEN 10 MG: 10 TABLET ORAL at 16:15

## 2021-02-28 RX ADMIN — ASPIRIN 81 MG: 81 TABLET, CHEWABLE ORAL at 08:48

## 2021-02-28 RX ADMIN — SODIUM CHLORIDE 650 MG: 9 INJECTION, SOLUTION INTRAVENOUS at 13:41

## 2021-02-28 RX ADMIN — BACLOFEN 10 MG: 10 TABLET ORAL at 08:48

## 2021-02-28 RX ADMIN — DOCUSATE SODIUM 100 MG: 100 CAPSULE, LIQUID FILLED ORAL at 08:48

## 2021-02-28 RX ADMIN — GABAPENTIN 300 MG: 300 CAPSULE ORAL at 08:48

## 2021-03-01 LAB
ANION GAP SERPL CALCULATED.3IONS-SCNC: 6 MMOL/L (ref 5–15)
BACTERIA SPEC AEROBE CULT: NORMAL
BACTERIA SPEC ANAEROBE CULT: ABNORMAL
BUN SERPL-MCNC: 7 MG/DL (ref 8–23)
BUN/CREAT SERPL: 10 (ref 7–25)
CALCIUM SPEC-SCNC: 8.6 MG/DL (ref 8.6–10.5)
CHLORIDE SERPL-SCNC: 102 MMOL/L (ref 98–107)
CO2 SERPL-SCNC: 31 MMOL/L (ref 22–29)
CREAT SERPL-MCNC: 0.7 MG/DL (ref 0.57–1)
DEPRECATED RDW RBC AUTO: 52.6 FL (ref 37–54)
ERYTHROCYTE [DISTWIDTH] IN BLOOD BY AUTOMATED COUNT: 13.2 % (ref 12.3–15.4)
GFR SERPL CREATININE-BSD FRML MDRD: 83 ML/MIN/1.73
GLUCOSE SERPL-MCNC: 94 MG/DL (ref 65–99)
GRAM STN SPEC: NORMAL
HCT VFR BLD AUTO: 36.8 % (ref 34–46.6)
HGB BLD-MCNC: 11.2 G/DL (ref 12–15.9)
MCH RBC QN AUTO: 32.7 PG (ref 26.6–33)
MCHC RBC AUTO-ENTMCNC: 30.4 G/DL (ref 31.5–35.7)
MCV RBC AUTO: 107.6 FL (ref 79–97)
PLATELET # BLD AUTO: 224 10*3/MM3 (ref 140–450)
PMV BLD AUTO: 9.6 FL (ref 6–12)
POTASSIUM SERPL-SCNC: 4 MMOL/L (ref 3.5–5.2)
RBC # BLD AUTO: 3.42 10*6/MM3 (ref 3.77–5.28)
SODIUM SERPL-SCNC: 139 MMOL/L (ref 136–145)
WBC # BLD AUTO: 9.93 10*3/MM3 (ref 3.4–10.8)

## 2021-03-01 PROCEDURE — 99233 SBSQ HOSP IP/OBS HIGH 50: CPT | Performed by: INTERNAL MEDICINE

## 2021-03-01 PROCEDURE — 94799 UNLISTED PULMONARY SVC/PX: CPT

## 2021-03-01 PROCEDURE — 85027 COMPLETE CBC AUTOMATED: CPT | Performed by: INTERNAL MEDICINE

## 2021-03-01 PROCEDURE — 25010000002 MORPHINE PER 10 MG: Performed by: SURGERY

## 2021-03-01 PROCEDURE — 80048 BASIC METABOLIC PNL TOTAL CA: CPT | Performed by: INTERNAL MEDICINE

## 2021-03-01 PROCEDURE — 25010000002 CEFTRIAXONE PER 250 MG: Performed by: SURGERY

## 2021-03-01 PROCEDURE — 97110 THERAPEUTIC EXERCISES: CPT

## 2021-03-01 PROCEDURE — 25010000002 DAPTOMYCIN PER 1 MG: Performed by: SURGERY

## 2021-03-01 PROCEDURE — 97530 THERAPEUTIC ACTIVITIES: CPT

## 2021-03-01 PROCEDURE — 94660 CPAP INITIATION&MGMT: CPT

## 2021-03-01 RX ORDER — POLYETHYLENE GLYCOL 3350 17 G/17G
17 POWDER, FOR SOLUTION ORAL DAILY
Status: DISCONTINUED | OUTPATIENT
Start: 2021-03-01 | End: 2021-03-05 | Stop reason: HOSPADM

## 2021-03-01 RX ADMIN — SODIUM CHLORIDE, PRESERVATIVE FREE 10 ML: 5 INJECTION INTRAVENOUS at 21:31

## 2021-03-01 RX ADMIN — HYDROXYZINE HYDROCHLORIDE 25 MG: 25 TABLET, FILM COATED ORAL at 21:31

## 2021-03-01 RX ADMIN — SODIUM CHLORIDE, PRESERVATIVE FREE 10 ML: 5 INJECTION INTRAVENOUS at 09:29

## 2021-03-01 RX ADMIN — FLUCONAZOLE 200 MG: 200 TABLET ORAL at 09:29

## 2021-03-01 RX ADMIN — BACLOFEN 10 MG: 10 TABLET ORAL at 18:04

## 2021-03-01 RX ADMIN — BACLOFEN 10 MG: 10 TABLET ORAL at 09:29

## 2021-03-01 RX ADMIN — GABAPENTIN 300 MG: 300 CAPSULE ORAL at 21:31

## 2021-03-01 RX ADMIN — ASPIRIN 81 MG: 81 TABLET, CHEWABLE ORAL at 09:27

## 2021-03-01 RX ADMIN — GABAPENTIN 300 MG: 300 CAPSULE ORAL at 09:29

## 2021-03-01 RX ADMIN — OXYCODONE HYDROCHLORIDE AND ACETAMINOPHEN 1 TABLET: 5; 325 TABLET ORAL at 21:31

## 2021-03-01 RX ADMIN — POLYETHYLENE GLYCOL 3350 17 G: 17 POWDER, FOR SOLUTION ORAL at 14:18

## 2021-03-01 RX ADMIN — CEFTRIAXONE SODIUM 2 G: 2 INJECTION, POWDER, FOR SOLUTION INTRAMUSCULAR; INTRAVENOUS at 18:04

## 2021-03-01 RX ADMIN — ALLOPURINOL 300 MG: 300 TABLET ORAL at 09:27

## 2021-03-01 RX ADMIN — DOCUSATE SODIUM 100 MG: 100 CAPSULE, LIQUID FILLED ORAL at 09:29

## 2021-03-01 RX ADMIN — FAMOTIDINE 20 MG: 20 TABLET, FILM COATED ORAL at 06:35

## 2021-03-01 RX ADMIN — DULOXETINE HYDROCHLORIDE 30 MG: 30 CAPSULE, DELAYED RELEASE ORAL at 09:29

## 2021-03-01 RX ADMIN — SODIUM HYPOCHLORITE: 1.25 SOLUTION TOPICAL at 14:21

## 2021-03-01 RX ADMIN — LEVOTHYROXINE SODIUM 137 MCG: 0.14 TABLET ORAL at 06:35

## 2021-03-01 RX ADMIN — BACLOFEN 10 MG: 10 TABLET ORAL at 21:31

## 2021-03-01 RX ADMIN — SODIUM CHLORIDE 650 MG: 9 INJECTION, SOLUTION INTRAVENOUS at 14:18

## 2021-03-01 RX ADMIN — DOCUSATE SODIUM 100 MG: 100 CAPSULE, LIQUID FILLED ORAL at 21:31

## 2021-03-01 RX ADMIN — FAMOTIDINE 20 MG: 20 TABLET, FILM COATED ORAL at 18:04

## 2021-03-01 RX ADMIN — MULTIVITAMIN TABLET 1 TABLET: TABLET at 09:29

## 2021-03-01 RX ADMIN — MORPHINE SULFATE 4 MG: 4 INJECTION, SOLUTION INTRAMUSCULAR; INTRAVENOUS at 14:33

## 2021-03-01 RX ADMIN — FERROUS SULFATE TAB 325 MG (65 MG ELEMENTAL FE) 325 MG: 325 (65 FE) TAB at 09:29

## 2021-03-01 NOTE — PROGRESS NOTES
[facility] Medicine Services  PROGRESS NOTE    Patient Name: Raquel Carranza  : 1955  MRN: [id number]    Date of Admission: 2021  Primary Care Physician: Sommer Paniagua MD    Subjective   Subjective     CC:  L groin pain    HPI:  Pt did okay last pm. Denies any issues overnight.  Desatted this am with minimal movement, now on high flow.    ROS:  Gen- No fevers, chills  CV- No chest pain, palpitations  Resp- No cough, dyspnea  GI- No N/V/D, abd pain        Objective   Objective     Vital Signs:   Temp:  [97.7 °F (36.5 °C)-98.6 °F (37 °C)] 97.7 °F (36.5 °C)  Heart Rate:  [55-68] 57  Resp:  [18-20] 20  BP: ()/(41-59) 102/59  Total (NIH Stroke Scale): 0     Physical Exam:  Constitutional: No acute distress, awake, alert, morbidly obese WF  HENT: NCAT, mucous membranes moist  Respiratory: Clear to auscultation bilaterally, respiratory effort normal on high flow O2  Cardiovascular: RRR, no murmurs, rubs, or gallops  Gastrointestinal: Positive bowel sounds, soft, nontender, nondistended  Musculoskeletal: No bilateral ankle edema  Psychiatric: Appropriate affect, cooperative  Neurologic: Oriented x 3, strength symmetric in all extremities, Cranial Nerves grossly intact to confrontation, speech clear  Skin: Left groin wound site with dressing in place c/d/i    Results Reviewed:  Results from last 7 days   Lab Units 2150 21  0438   WBC 10*3/mm3 9.93 12.63*  --  11.70* 9.81   HEMOGLOBIN g/dL 11.2* 12.2 13.2 15.3 15.1   HEMATOCRIT % 36.8 40.4 43.0 50.2* 49.4*   PLATELETS 10*3/mm3 224 227  --  223 241   INR   --   --   --   --  1.26*     Results from last 7 days   Lab Units 21  0550 21  05   SODIUM mmol/L 139 139 139   POTASSIUM mmol/L 4.0 4.4 4.2   CHLORIDE mmol/L 102 103 102   CO2 mmol/L 31.0* 29.0 31.0*   BUN mg/dL 7* 10 10   CREATININE mg/dL 0.70 0.74 0.75   GLUCOSE mg/dL 94 90 82   CALCIUM  mg/dL 8.6 8.8 9.3     Estimated Creatinine Clearance: 113.1 mL/min (by C-G formula based on SCr of 0.7 mg/dL).    Microbiology Results Abnormal     Procedure Component Value - Date/Time    Anaerobic Culture - Tissue, Thigh, Left [[id number]] Collected: 02/26/21 1524    Lab Status: Preliminary result Specimen: Tissue from Thigh, Left Updated: 03/01/21 0758     Anaerobic Culture Screening for Anaerobes    Wound Culture - Swab, Thigh, Left [[id number]] Collected: 02/26/21 1524    Lab Status: Final result Specimen: Swab from Thigh, Left Updated: 03/01/21 0620     Wound Culture No growth at 3 days     Gram Stain No organisms seen      Many (4+) WBCs per low power field      Rare (1+) Epithelial cells per low power field    AFB Culture - Tissue, Thigh, Left [[id number]] Collected: 02/27/21 1402    Lab Status: Preliminary result Specimen: Tissue from Thigh, Left Updated: 02/28/21 1034     AFB Stain No acid fast bacilli seen on concentrated smear    Tissue / Bone Culture - Tissue, Thigh, Left [[id number]] Collected: 02/27/21 1402    Lab Status: Preliminary result Specimen: Tissue from Thigh, Left Updated: 02/28/21 0852     Tissue Culture No growth     Gram Stain Many (4+) WBCs seen      No organisms seen    Urine Culture - Urine, Urine, Clean Catch [[id number]]  (Abnormal) Collected: 02/25/21 1611    Lab Status: Final result Specimen: Urine, Clean Catch Updated: 02/26/21 1300     Urine Culture Yeast isolated     Comment: No further workup.       Blood Culture - Blood, Arm, Left [[id number]] Collected: 02/19/21 1900    Lab Status: Final result Specimen: Blood from Arm, Left Updated: 02/24/21 1930     Blood Culture No growth at 5 days    Blood Culture - Blood, Arm, Right [[id number]] Collected: 02/19/21 1850    Lab Status: Final result Specimen: Blood from Arm, Right Updated: 02/24/21 1930     Blood Culture No growth at 5 days    COVID-19 and FLU A/B PCR - Swab, Nasopharynx [[id number]]  (Normal) Collected: 02/19/21 1911    Lab  Status: Final result Specimen: Swab from Nasopharynx Updated: 02/19/21 2143     COVID19 Not Detected     Influenza A PCR Not Detected     Influenza B PCR Not Detected    Narrative:      Fact sheet for providers: https://www.fda.gov/media/087645/download    Fact sheet for patients: https://www.fda.gov/media/503218/download    Test performed by PCR.          Imaging Results (Last 24 Hours)     ** No results found for the last 24 hours. **          Results for orders placed during the hospital encounter of 02/19/21   Adult Transthoracic Echo Complete W/ Cont if Necessary Per Protocol (With Agitated Saline)    Narrative · Left ventricular ejection fraction appears to be 61 - 65%. Left   ventricular systolic function is normal.  · Left ventricular diastolic function was normal.          I have reviewed the medications:  Scheduled Meds:Pharmacy Consult, , Does not apply, BID  allopurinol, 300 mg, Oral, Daily  aspirin, 81 mg, Oral, Daily  baclofen, 10 mg, Oral, TID  cefTRIAXone, 2 g, Intravenous, Q24H  DAPTOmycin, 6 mg/kg (Adjusted), Intravenous, Q24H  docusate sodium, 100 mg, Oral, BID  DULoxetine, 30 mg, Oral, Daily  famotidine, 20 mg, Oral, BID AC  ferrous sulfate, 325 mg, Oral, Daily With Breakfast  fluconazole, 200 mg, Oral, Q24H  gabapentin, 300 mg, Oral, Q12H  levothyroxine, 137 mcg, Oral, Q AM  metoprolol succinate XL, 25 mg, Oral, Daily  multivitamin, 1 tablet, Oral, Daily  sodium chloride, 10 mL, Intravenous, Q12H  sodium hypochlorite, , Topical, Q12H  Vitamin D3, 50,000 Units, Oral, Q7 Days      Continuous Infusions:lactated ringers, 9 mL/hr, Last Rate: 9 mL/hr (02/27/21 1325)  sodium chloride, 100 mL/hr, Last Rate: 100 mL/hr (02/28/21 2243)      PRN Meds:.acetaminophen  •  aluminum-magnesium hydroxide-simethicone  •  hydrOXYzine  •  magnesium sulfate **OR** magnesium sulfate **OR** magnesium sulfate  •  Morphine  •  oxyCODONE-acetaminophen  •  sodium chloride  •  Insert peripheral IV **AND** sodium chloride  •   sodium chloride    Assessment/Plan   Assessment & Plan     Active Hospital Problems    Diagnosis  POA   • **Sepsis due to cellulitis (CMS/[facility]) [L03.90, A41.9]  Yes   • RAFA (acute kidney injury) (CMS/[facility]) [N17.9]  Unknown   • Arrhythmia [I49.9]  Unknown   • Hypertension [I10]  Unknown   • 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 rheumatoid factor (CMS/[facility]) [M05.79]  Unknown   • Sjogren's disease (CMS/[facility]) [M35.00]  Unknown   • Acquired hypothyroidism [E03.9]  Unknown   • CKD (chronic kidney disease) [N18.9]  Unknown   • Altered mental status [R41.82]  Unknown   • Hypoxia [R09.02]  Unknown   • Word finding difficulty [R47.89]  Unknown   • History of deep vein thrombosis of lower extremity [Z86.718]  Not Applicable      Resolved Hospital Problems   No resolved problems to display.        Brief Hospital Course to date:  Raquel Carranza is a 65 y.o. female with h/o hypothyroidism, HTN, RA, Sjogren's disease, DVT on Xarelto, MAHESH/CPAP, CKD, and arrhythmia who presented with sepsis due to Left groin cellulitis.  Since admission, pt's L groin site has progressed to an abscess. She went to the OR with Dr. Ken on  for I&D.  Post op, pt had significant bleeding from wound site, so was taken back to the OR  with Dr. Bobby s/p electrocautery or suture ligation of multiple bleeding sites as well as I&D of wound as more necrotic tissue was noted.     This patient's problems and plans were partially entered by my partner and updated as appropriate by me 21.    Plan:    Sepsis/L groin cellulitis/abscess  --IV abx per ID  --ID following, initially assessed by surgery with no intervention warranted   --ultrasound and clinical exam on  c/w abscess. S/p I&D by Dr. Ken  and repeat I&D as well as control of bleeding sites by Dr. Bobby   --wound cultures NGTD  -- BP better today, will let IVFs  (got 100cc/hr for 24 hours)    Funguria  --from UCx obtained   --started on  Fluconazole 2/26 by ID    Acute hypoxic respiratory failure  --thought secondary to volume overload s/p bumex  --ECHO with normal EF  --hypoxia likely due to OHS/MAHESH  --complicated by body habitus  --relatively stable overall  --doubt PE given chronic AC    Probable CKD  --stable    Sjogren's/RA  --not actively on any immunosuppression    Hx of DVT  --on Xarelto at home, initially on Heparin gtt here due to ? Need for surgery then transitioned back to Xarelto. Held Xarelto for possible intervention. Resume once okay with surgery    AMS  --new visual disturbance/dizziness  --neurology reconsulted, appreciate their assistance. They have now signed off.   --possibly due to medications  --likely has significant anxiety component, added atarax    Left flank pain  --resolved, checked UA/UCx on 2/25 and findings as mentioned above    DVT Prophylaxis: resume Xarelto when okay with Surgery      Disposition: I expect the patient to be discharged TBD, will have PT/OT reassess once pt is able to ambulate    CODE STATUS:   Code Status and Medical Interventions:   Ordered at: 02/19/21 8211     Code Status:    CPR     Medical Interventions (Level of Support Prior to Arrest):    Full       Uyen Martinez MD  03/01/21

## 2021-03-01 NOTE — PLAN OF CARE
Goal Outcome Evaluation:  Plan of Care Reviewed With: patient  Progress: no change  Outcome Summary: Pt performs sit<>stand with MOD Ax2 1st  performance, using RWx, pt then ambulates 3ft forward, then backs up and sits at EOB. Re-setup to prep room for improved ambulation distance 2/2 HFNC, but pt unable to stand from EOB 2nd time. Pt assisted back into bed dependently as pt was unable to scoot back. Assist of three given for scooting toward HOB. SNF recommended at d/c.

## 2021-03-01 NOTE — PROGRESS NOTES
"General Surgery Post op Follow Up Note    Events noted over the weekend.    Subjective:   No new complaints.    /59 (BP Location: Right arm, Patient Position: Lying)   Pulse 57   Temp 97.7 °F (36.5 °C) (Oral)   Resp 20   Ht 172.7 cm (68\")   Wt (!) 167 kg (368 lb 2.7 oz)   SpO2 95%   BMI 55.98 kg/m²     General Appearance:  in no acute distress  Abdomen: incision is clean and dry     CBC  Results from last 7 days   Lab Units 03/01/21  0550   WBC 10*3/mm3 9.93   HEMOGLOBIN g/dL 11.2*   HEMATOCRIT % 36.8   PLATELETS 10*3/mm3 224       CMP/BMP  Results from last 7 days   Lab Units 03/01/21  0550   SODIUM mmol/L 139   POTASSIUM mmol/L 4.0   CHLORIDE mmol/L 102   CO2 mmol/L 31.0*   BUN mg/dL 7*   CREATININE mg/dL 0.70   CALCIUM mg/dL 8.6   GLUCOSE mg/dL 94         ASSESSMENT/PLAN:    Left groin abscess    Start dressing changes.    Noe Ken MD  3/1/2021  08:42 EST  "

## 2021-03-01 NOTE — THERAPY TREATMENT NOTE
Patient Name: Raquel Carranza  : 1955    MRN: [id number]                              Today's Date: 3/1/2021       Admit Date: 2021    Visit Dx:     ICD-10-CM ICD-9-CM   1. Sepsis, due to unspecified organism, unspecified whether acute organ dysfunction present (CMS/HCC)  A41.9 038.9     995.91   2. Left leg cellulitis  L03.116 682.6   3. Abscess of left thigh  L02.416 682.6     Patient Active Problem List   Diagnosis   • History of deep vein thrombosis of lower extremity   • Special screening for malignant neoplasms, colon   • Sepsis due to cellulitis (CMS/HCC)   • RAFA (acute kidney injury) (CMS/HCC)   • Arrhythmia   • Hypertension   • 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 rheumatoid factor (CMS/HCC)   • Sjogren's disease (CMS/HCC)   • Acquired hypothyroidism   • CKD (chronic kidney disease)   • Altered mental status   • Hypoxia   • Word finding difficulty     Past Medical History:   Diagnosis Date   • Abnormal glucose    • Acquired hypothyroidism    • Anxiety    • Arrhythmia    • Arthritis    • Bilateral edema of lower extremity    • Chronic joint pain    • Hypertension    • Iron deficiency anemia    • Irritable bowel syndrome    • 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 rheumatoid factor (CMS/HCC)    • Sjogren's disease (CMS/HCC)    • Thrombosis of right saphenous vein    • Vitamin D deficiency      Past Surgical History:   Procedure Laterality Date   • APPENDECTOMY     • HERNIA REPAIR     • LEG EXCISION LESION/CYST Left 2021    Procedure: INCISION AND DRAINAGE LEFT THIGH ABCESS;  Surgeon: Noe Ken MD;  Location:  [facility];  Service: General;  Laterality: Left;   • LEG EXCISION LESION/CYST Left 2021    Procedure: WOUND EXPLORATION LEFT THIGH FOR CONTROL OF BLEEDING;  Surgeon: Sreedhar Bobby MD;  Location:  [facility];  Service: General;  Laterality: Left;     General Information     Row Name 21 8585          Physical Therapy  Time and Intention    Document Type  therapy note (daily note)  -EJ     Mode of Treatment  physical therapy  -     Row Name 03/01/21 1502          General Information    Patient Profile Reviewed  yes  -EJ     Existing Precautions/Restrictions  fall;oxygen therapy device and L/min;other (see comments) hi flow mask. Now s/p I&D 2/26 with 2nd exploratory sx for bleeding2/27 POD 2.  -EJ     Barriers to Rehab  medically complex;previous functional deficit  -     Row Name 03/01/21 1502          Cognition    Orientation Status (Cognition)  oriented x 4  -     Row Name 03/01/21 1502          Safety Issues, Functional Mobility    Safety Issues Affecting Function (Mobility)  insight into deficits/self-awareness  -     Impairments Affecting Function (Mobility)  endurance/activity tolerance;pain;range of motion (ROM);shortness of breath;strength  -EJ       User Key  (r) = Recorded By, (t) = Taken By, (c) = Cosigned By    Initials Name Provider Type    EJ Donya Foreman PT Physical Therapist        Mobility     Row Name 03/01/21 1504          Bed Mobility    Scooting/Bridging [place] (Bed Mobility)  dependent (less than 25% patient effort)  -EJ     Supine-Sit [place] (Bed Mobility)  moderate assist (50% patient effort);2 person assist  -     Sit-Supine [place] (Bed Mobility)  2 person assist;dependent (less than 25% patient effort)  -     Comment (Bed Mobility)  Pt unable to stand 2nd attempt, starting to slide at EOB. PT/Tech, and 3rd POH assisted pt back into bed then scooted pt up in bed.  -     Row Name 03/01/21 1504          Transfers    Comment (Transfers)  Pt stands with rocking during 1 attempt. Pt required assist to keep walker on ground as she pulled up to walker. Pt was unable to perform 2nd time.  -     Row Name 03/01/21 1504          Sit-Stand Transfer    Sit-Stand [place] (Transfers)  (S) verbal cues;2 person assist;moderate assist (50% patient effort) increased assist for  2nd stand unsuccessful.  -     Row Name 03/01/21 1504          Gait/Stairs (Locomotion)    [place] Level (Gait)  contact guard;2 person assist  -EJ     Assistive Device (Gait)  walker, front-wheeled  -EJ     Distance in Feet (Gait)  3  -EJ     Comment (Gait/Stairs)  Pt ambulates in room x 3 ft forward and back. Pt frustrated from small space she is able to ambulate 2/2 HFNC. PT changed setup to allow for more space while pt sitting, but pt unable to stand 2nd time with increased assist.  -EJ       User Key  (r) = Recorded By, (t) = Taken By, (c) = Cosigned By    Initials Name Provider Type    Donya Marcelino PT Physical Therapist        Obj/Interventions     Row Name 03/01/21 1510          Hip (Therapeutic Exercise)    Hip (Therapeutic Exercise)  isometric exercises  -     Hip Isometrics (Therapeutic Exercise)  gluteal sets;10 repetitions;bilateral  -     Row Name 03/01/21 1510          Knee (Therapeutic Exercise)    Knee (Therapeutic Exercise)  isometric exercises  -     Knee Isometrics (Therapeutic Exercise)  quad sets;10 repetitions;bilateral  -EJ     Knee Strengthening (Therapeutic Exercise)  bilateral;SLR (straight leg raise);10 repetitions  -EJ       User Key  (r) = Recorded By, (t) = Taken By, (c) = Cosigned By    Initials Name Provider Type    Donya Marcelino PT Physical Therapist        Goals/Plan    No documentation.       Clinical Impression     Row Name 03/01/21 1511          Pain    Additional Documentation  Pain Scale: FACES Pre/Post-Treatment (Group)  -[facility] Name 03/01/21 1511          Pain Scale: FACES Pre/Post-Treatment    Pain: FACES Scale, Pretreatment  0-->no hurt  -EJ     Posttreatment Pain Rating  2-->hurts little bit  -EJ     Pain Location - Side  Left  -EJ     Pain Location - Orientation  incisional  -EJ     Pain Location  extremity  -EJ     Pre/Posttreatment Pain Comment  Pt reports feeling that she was sweating around bandage/wound  -[facility] Name 03/01/21  1511          Plan of Care Review    Plan of Care Reviewed With  patient  -EJ     Outcome Summary  Pt performs sit<>stand with MOD Ax2 1st  performance, using RWx, pt then ambulates 3ft forward, then backs up and sits at EOB. Re-setup to prep room for improved ambulation distance 2/2 HFNC, but pt unable to stand from EOB 2nd time. Pt assisted back into bed dependently as pt was unable to scoot back. Assist of three given for scooting toward HOB. SNF recommended at d/c.  -EJ     Row Name 03/01/21 1511          Vital Signs    Pre Systolic BP Rehab  -- VSS  -EJ     Pre Patient Position  Supine  -EJ     Intra Patient Position  Sitting  -EJ     Row Name 03/01/21 1511          Positioning and Restraints    Pre-Treatment Position  in bed  -EJ     Post Treatment Position  bed  -EJ     In Bed  fowlers;call light within reach;encouraged to call for assist;exit alarm on;side rails up x2  -EJ       User Key  (r) = Recorded By, (t) = Taken By, (c) = Cosigned By    Initials Name Provider Type    Donya Marcelino PT Physical Therapist        Outcome Measures     Row Name 03/01/21 1520          How much help from another person do you currently need...    Turning from your back to your side while in flat bed without using bedrails?  3  -EJ     Moving from lying on back to sitting on the side of a flat bed without bedrails?  3  -EJ     Moving to and from a bed to a chair (including a wheelchair)?  3  -EJ     Standing up from a chair using your arms (e.g., wheelchair, bedside chair)?  2  -EJ     Climbing 3-5 steps with a railing?  1  -EJ     To walk in hospital room?  2  -EJ     AM-PAC 6 Clicks Score (PT)  14  -EJ     Row Name 03/01/21 1520          Functional Assessment    Outcome Measure Options  AM-PAC 6 Clicks Basic Mobility (PT)  -EJ       User Key  (r) = Recorded By, (t) = Taken By, (c) = Cosigned By    Initials Name Provider Type    Donya Marcelino PT Physical Therapist        Physical Therapy Education                  Title: PT OT SLP Therapies (In Progress)     Topic: Physical Therapy (In Progress)     Point: Mobility training (In Progress)     Learning Progress Summary           Patient Acceptance, E, NR by EJ at 3/1/2021 1521    Acceptance, E, NR by EJ at 2/26/2021 1107    Acceptance, E, NR by AS at 2/23/2021 1129    Acceptance, E, NR by EJ at 2/22/2021 1648    Acceptance, E, NR by LM at 2/20/2021 1038                   Point: Home exercise program (In Progress)     Learning Progress Summary           Patient Acceptance, E, NR by EJ at 3/1/2021 1521    Acceptance, E, NR by EJ at 2/26/2021 1107    Acceptance, E, NR by AS at 2/23/2021 1129    Acceptance, E, NR by EJ at 2/22/2021 1648                   Point: Precautions (In Progress)     Learning Progress Summary           Patient Acceptance, E, NR by EJ at 3/1/2021 1521    Acceptance, E, NR by EJ at 2/26/2021 1107    Acceptance, E, NR by AS at 2/23/2021 1129    Acceptance, E, NR by EJ at 2/22/2021 1648    Acceptance, E, NR by LM at 2/20/2021 1038                               User Key     Initials Effective Dates Name Provider Type Discipline    EJ 11/20/18 -  Donya Foreman, PT Physical Therapist PT    AS 06/22/15 -  Araceli Saravia PTA Physical Therapy Assistant PT    LM 07/24/19 -  Megan Peralta PT Physical Therapist PT              PT Recommendation and Plan     Plan of Care Reviewed With: patient  Progress: no change  Outcome Summary: Pt performs sit<>stand with MOD Ax2 1st  performance, using RWx, pt then ambulates 3ft forward, then backs up and sits at EOB. Re-setup to prep room for improved ambulation distance 2/2 HFNC, but pt unable to stand from EOB 2nd time. Pt assisted back into bed dependently as pt was unable to scoot back. Assist of three given for scooting toward HOB. SNF recommended at d/c.     Time Calculation:   PT Charges     Row Name 03/01/21 1521             Time Calculation    Start Time  1347  -EJ      PT Received On  03/01/21  -EJ      PT  Goal Re-Cert Due Date  03/02/21  -EJ         Time Calculation- PT    Total Timed Code Minutes- PT  27 minute(s)  -EJ         Timed Charges    86237 - PT Therapeutic Exercise Minutes  6  -EJ      69566 - PT Therapeutic Activity Minutes  21  -EJ        User Key  (r) = Recorded By, (t) = Taken By, (c) = Cosigned By    Initials Name Provider Type     Donya Foreman, PT Physical Therapist        Therapy Charges for Today     Code Description Service Date Service Provider Modifiers Qty    14611140028 HC PT THER PROC EA 15 MIN 3/1/2021 Donya Foreman, PT GP 1    21974931715 HC PT THERAPEUTIC ACT EA 15 MIN 3/1/2021 Donya Foreman, PT GP 1    78610141102 HC PT THER SUPP EA 15 MIN 3/1/2021 Donya Foreman, PT GP 2          PT G-Codes  Outcome Measure Options: AM-PAC 6 Clicks Basic Mobility (PT)  AM-PAC 6 Clicks Score (PT): 14  AM-PAC 6 Clicks Score (OT): 17    Donya Trujillo, PT  3/1/2021

## 2021-03-01 NOTE — PROGRESS NOTES
INFECTIOUS DISEASE Progress Note    Raquel Carranza  1955  [id number]      Admission Date: 2/19/2021      Requesting Provider: Brock Mata MD  Evaluating Physician: Som Martinez MD    Reason for Consultation: Sepsis    History of present illness:    2/20/21: Patient is a 65 y.o. female with h/o hypothyroidism, HTN, RA, Sjogren's disease, DVT/Xarelto, MAHESH/CPAP, CKD, and arrhythmia who we were asked to see for sepsis.  She presented to [facility] ED on 2/19/21 with fever and left groin pain for the past 3 days.  She noted a boil that started in her left groin about 3 days ago that increased in size and became more painful.  She became more confused and had difficulty with word-finding capability.  She developed a fever of 103 at home yesterday and her son brought her to ED for further evaluation.  Initial evaluation showed Tmax 103, , PCT 4.46, Creatinine 1.22 (baseline about 1.2), WBC 17,900 with 81% segs/6% bands, and UA WBC 21-30 with small LE and negative nitrite.  A COVID-19 PCR was negative. Blood cultures are pending.  CT scan of neck and head along with MRI ruled out acute stroke.  A CXR is unremarkable.  A CT scan of LLE and pelvis showed left upper thigh cellulitis extending to left obturator muscle with no evidence of abscess.  She was started on Merrem and Vancomycin.  ID was asked to evaluate and manage her antibiotic therapy.     2/21/21: Her maximum temperature over the last 24 hours is 100.3. Her blood cultures have remained negative.  She states that she is allergic to penicillin but has previously tolerated Keflex.  She has decreased left groin/medial thigh pain.    2/22/21: She feels better today.  She has decreased left medial thigh.  She has remained afebrile.  She denies nausea and vomiting.    2/23/21:  She has remained afebrile. White blood cell count this morning is 12.9. She has decreased left medial thigh pain and swelling.    2/24/21: She has remained afebrile. She has decreased  left medial thigh pain.  Ultrasound today revealed a developing abscess.    2/25/21: She has remained afebrile. She has decreased left medial thigh pain.  She denies cough and sputum production.    2/26/21: She is scheduled for operative debridement of her left thigh abscess. She has remained afebrile. Her urinalysis reveals 31-50 white blood cells and 2+ yeast.  She denies increased left thigh pain.    2/27/21: She underwent operative debridement of her left thigh abscess yesterday.  She has remained afebrile overnight. Her abscess cultures are no growth so far.  The material was not malodorous.    3/1/21: She was taken back to the operating room on 2/27 for continued wound bleeding. Additional necrotic tissue was sent for culture. Abscess cultures from 2/26 were negative but antibiotic modified. Her hemoglobin this morning is 11.2 and her white blood cell count is 9.9. She has remained afebrile.            Past Medical History:   Diagnosis Date   • Abnormal glucose    • Acquired hypothyroidism    • Anxiety    • Arrhythmia    • Arthritis    • Bilateral edema of lower extremity    • Chronic joint pain    • Hypertension    • Iron deficiency anemia    • Irritable bowel syndrome    • 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 rheumatoid factor (CMS/HCC)    • Sjogren's disease (CMS/HCC)    • Thrombosis of right saphenous vein    • Vitamin D deficiency        Past Surgical History:   Procedure Laterality Date   • APPENDECTOMY     • HERNIA REPAIR         Family History   Problem Relation Age of Onset   • Cancer Mother         leukemia   • Hypertension Father    • Stroke Father    • Heart disease Father    • Cancer Father         colon   • Diabetes Brother    • Diabetes Paternal Aunt    • Diabetes Paternal Uncle        Social History     Socioeconomic History   • Marital status:      Spouse name: Not on file   • Number of children: Not on file   • Years of education: Not on file   • Highest  education level: Not on file   Tobacco Use   • Smoking status: Current Every Day Smoker     Packs/day: 1.00     Types: Cigarettes   • Smokeless tobacco: Never Used   Substance and Sexual Activity   • Alcohol use: No   • Drug use: Never   • Sexual activity: Defer       Allergies   Allergen Reactions   • Ace Inhibitors    • Penicillins    • Adacel [Tetanus-Diphth-Acell Pertussis]      Preservative in the vaccine   • Lisinopril    • Zostavax [Zoster Vaccine Live]      Preservative in the vaccine   • Thimerosal Rash         Medication:    Current Facility-Administered Medications:   •  !Patient's home meds stored in pharmacy, , Does not apply, BID, Sreedhar Bobby MD  •  acetaminophen (TYLENOL) tablet 650 mg, 650 mg, Oral, Q6H PRN, Sreedhar Bobby MD, 650 mg at 02/28/21 1614  •  allopurinol (ZYLOPRIM) tablet 300 mg, 300 mg, Oral, Daily, Sreedhar Bobby MD, 300 mg at 02/28/21 0848  •  aluminum-magnesium hydroxide-simethicone (MAALOX MAX) 400-400-40 MG/5ML suspension 15 mL, 15 mL, Oral, Q6H PRN, Sreedhar Bobby MD, 15 mL at 02/19/21 2144  •  aspirin chewable tablet 81 mg, 81 mg, Oral, Daily, 81 mg at 02/28/21 0848 **OR** [DISCONTINUED] aspirin suppository 300 mg, 300 mg, Rectal, Daily, Noe Ken MD  •  baclofen (LIORESAL) tablet 10 mg, 10 mg, Oral, TID, Sreedhar Bobby MD, 10 mg at 02/28/21 2100  •  cefTRIAXone (ROCEPHIN) 2 g/100 mL 0.9% NS IVPB (MBP), 2 g, Intravenous, Q24H, Sreedhar Bobby MD, 2 g at 02/28/21 1615  •  DAPTOmycin (CUBICIN) 650 mg in sodium chloride 0.9 % 50 mL IVPB, 6 mg/kg (Adjusted), Intravenous, Q24H, Sreedhar Bobby MD, Last Rate: 100 mL/hr at 02/28/21 1341, 650 mg at 02/28/21 1341  •  docusate sodium (COLACE) capsule 100 mg, 100 mg, Oral, BID, Sreedhar Bobby MD, 100 mg at 02/28/21 2100  •  DULoxetine (CYMBALTA) DR capsule 30 mg, 30 mg, Oral, Daily, Sreedhar Bobby MD, 30 mg at 02/28/21 0848  •  famotidine (PEPCID) tablet 20 mg, 20 mg, Oral, BID AC, Kanu,  Sreedhar WANG MD, 20 mg at 03/01/21 0635  •  ferrous sulfate tablet 325 mg, 325 mg, Oral, Daily With Breakfast, Sreedhar Bobby MD, 325 mg at 02/28/21 0848  •  fluconazole (DIFLUCAN) tablet 200 mg, 200 mg, Oral, Q24H, Sreedhar Bobby MD, 200 mg at 02/28/21 0848  •  gabapentin (NEURONTIN) capsule 300 mg, 300 mg, Oral, Q12H, Sreedhar Bobby MD, 300 mg at 02/28/21 2100  •  hydrOXYzine (ATARAX) tablet 25 mg, 25 mg, Oral, TID PRN, Sreedhar Bobby MD, 25 mg at 02/28/21 2100  •  lactated ringers infusion, 9 mL/hr, Intravenous, Continuous, Sreedhar Bobby MD, Last Rate: 9 mL/hr at 02/27/21 1325, 9 mL/hr at 02/27/21 1325  •  levothyroxine (SYNTHROID, LEVOTHROID) tablet 137 mcg, 137 mcg, Oral, Q AM, Sreedhar Bobby MD, 137 mcg at 03/01/21 0635  •  Magnesium Sulfate 2 gram Bolus, followed by 8 gram infusion (total Mg dose 10 grams)- Mg less than or equal to 1mg/dL, 2 g, Intravenous, PRN **OR** Magnesium Sulfate 2 gram / 50mL Infusion (GIVE X 3 BAGS TO EQUAL 6GM TOTAL DOSE) - Mg 1.1 - 1.5 mg/dl, 2 g, Intravenous, PRN **OR** Magnesium Sulfate 4 gram infusion- Mg 1.6-1.9 mg/dL, 4 g, Intravenous, PRN, Sreedhar Bobby MD, Last Rate: 25 mL/hr at 02/22/21 1243, 4 g at 02/22/21 1243  •  metoprolol succinate XL (TOPROL-XL) 24 hr tablet 25 mg, 25 mg, Oral, Daily, Sreedhar Bobby MD, 25 mg at 02/26/21 0943  •  Morphine sulfate (PF) injection 4 mg, 4 mg, Intravenous, Q12H PRN, Sreedhar Bobby MD, 4 mg at 02/27/21 1027  •  multivitamin (THERAGRAN) tablet 1 tablet, 1 tablet, Oral, Daily, Sreedhar Bobyb MD, 1 tablet at 02/28/21 0848  •  oxyCODONE-acetaminophen (PERCOCET) 5-325 MG per tablet 1 tablet, 1 tablet, Oral, Q4H PRN, Sreedhar Bobby MD, 1 tablet at 02/27/21 0636  •  sodium chloride 0.9 % flush 10 mL, 10 mL, Intravenous, PRN, Sreedhar Bobby MD  •  Insert peripheral IV, , , Once **AND** sodium chloride 0.9 % flush 10 mL, 10 mL, Intravenous, PRN, Sreedhar Bobby MD  •  sodium chloride 0.9 % flush  10 mL, 10 mL, Intravenous, Q12H, Sreedhar Bobby MD, 10 mL at 21 0848  •  sodium chloride 0.9 % flush 10 mL, 10 mL, Intravenous, PRN, Sreedhar Bobby MD  •  sodium chloride 0.9 % infusion, 100 mL/hr, Intravenous, Continuous, Uyen Martinez MD, Last Rate: 100 mL/hr at 21 2243, 100 mL/hr at 21 2243  •  sodium hypochlorite (DAKIN'S 1/4 STRENGTH) 0.125 % topical solution 0.125% solution, , Topical, Q12H, Sreedhar Bobby MD, 5 mL at 21 0612  •  Vitamin D3 50,000 Units **PATIENT SUPPLIED MED**, 50,000 Units, Oral, Q7 Days, Sreedhar Bobby MD, 50,000 Units at 21 1741    Antibiotics:  Anti-Infectives (From admission, onward)    Ordered     Dose/Rate Route Frequency Start Stop    21 0734  fluconazole (DIFLUCAN) tablet 200 mg     Ordering Provider: Sreedhar Bobby MD    200 mg Oral Every 24 Hours 21 0900 21 0859    21 1458  cefTRIAXone (ROCEPHIN) 2 g/100 mL 0.9% NS IVPB (MBP)     Ordering Provider: Sreedhar Bobby MD    2 g  over 30 Minutes Intravenous Every 24 Hours 21 1600 21 1559    21 1226  DAPTOmycin (CUBICIN) 650 mg in sodium chloride 0.9 % 50 mL IVPB     Ordering Provider: Sreedhar Bobby MD    6 mg/kg × 105 kg (Adjusted)  100 mL/hr over 30 Minutes Intravenous Every 24 Hours 21 1315 21 1314    21 1851  meropenem (MERREM) 1 g/100 mL 0.9% NS VTB (mbp)     Ordering Provider: Amadou Templeton MD    1 g  over 30 Minutes Intravenous Once 21 1853 218    21 185  vancomycin 3000 mg/500 mL 0.9% NS IVPB ([facility])     Ordering Provider: Amadou Templeton MD    20 mg/kg × 167 kg Intravenous Once 21 1853 02/19/21 193            Review of Systems:  See HPI    Physical Exam:   Vital Signs  Temp (24hrs), Av.1 °F (36.7 °C), Min:97.7 °F (36.5 °C), Max:98.6 °F (37 °C)    Temp  Min: 97.7 °F (36.5 °C)  Max: 98.6 °F (37 °C)  BP  Min: 92/41  Max: 102/59  Pulse  Min: 55  Max: 68  Resp   Min: 18  Max: 20  SpO2  Min: 81 %  Max: 97 %    GENERAL: Awake and alert, in mild distress. She is obese and debilitated appearing  HEENT: Normocephalic, atraumatic.  PERRL. EOMI. No conjunctival injection. No icterus. Oropharynx clear without evidence of thrush or exudate.   NECK: Supple without nuchal rigidity.  HEART: RRR; No murmur,    LUNGS: Diminished at lung bases bilaterally without wheezing, rales, rhonchi. Normal respiratory effort. Nonlabored.  ABDOMEN: Soft, nontender, nondistended. No rebound or guarding.   EXT: Left thigh packing is in place with slight blood on the wound packing  :  Without Marx catheter. With external urinary cath.    SKIN: No rashes.  Left groin as above.  NEURO: Oriented to PPT. Normal speech and cognition.   PSYCHIATRIC: Normal insight and judgment. Cooperative with PE    Laboratory Data    Results from last 7 days   Lab Units 03/01/21  0550 02/28/21  0442 02/27/21  1952 02/27/21  0525   WBC 10*3/mm3 9.93 12.63*  --  11.70*   HEMOGLOBIN g/dL 11.2* 12.2 13.2 15.3   HEMATOCRIT % 36.8 40.4 43.0 50.2*   PLATELETS 10*3/mm3 224 227  --  223     Results from last 7 days   Lab Units 03/01/21  0550   SODIUM mmol/L 139   POTASSIUM mmol/L 4.0   CHLORIDE mmol/L 102   CO2 mmol/L 31.0*   BUN mg/dL 7*   CREATININE mg/dL 0.70   GLUCOSE mg/dL 94   CALCIUM mg/dL 8.6                     Results from last 7 days   Lab Units 02/27/21  0525   CK TOTAL U/L 21         Estimated Creatinine Clearance: 113.1 mL/min (by C-G formula based on SCr of 0.7 mg/dL).      Microbiology:  Microbiology Results (last 10 days)     Procedure Component Value - Date/Time    Tissue / Bone Culture - Tissue, Thigh, Left [[id number]] Collected: 02/27/21 1402    Lab Status: Preliminary result Specimen: Tissue from Thigh, Left Updated: 02/28/21 0852     Tissue Culture No growth     Gram Stain Many (4+) WBCs seen      No organisms seen    AFB Culture - Tissue, Thigh, Left [[id number]] Collected: 02/27/21 1402    Lab Status:  Preliminary result Specimen: Tissue from Thigh, Left Updated: 02/28/21 1034     AFB Stain No acid fast bacilli seen on concentrated smear    Anaerobic Culture - Tissue, Thigh, Left [[id number]] Collected: 02/26/21 1524    Lab Status: Preliminary result Specimen: Tissue from Thigh, Left Updated: 03/01/21 0758     Anaerobic Culture Screening for Anaerobes    Wound Culture - Swab, Thigh, Left [[id number]] Collected: 02/26/21 1524    Lab Status: Final result Specimen: Swab from Thigh, Left Updated: 03/01/21 0620     Wound Culture No growth at 3 days     Gram Stain No organisms seen      Many (4+) WBCs per low power field      Rare (1+) Epithelial cells per low power field    Urine Culture - Urine, Urine, Clean Catch [[id number]]  (Abnormal) Collected: 02/25/21 1611    Lab Status: Final result Specimen: Urine, Clean Catch Updated: 02/26/21 1300     Urine Culture Yeast isolated     Comment: No further workup.       COVID-19 and FLU A/B PCR - Swab, Nasopharynx [[id number]]  (Normal) Collected: 02/19/21 1911    Lab Status: Final result Specimen: Swab from Nasopharynx Updated: 02/19/21 2143     COVID19 Not Detected     Influenza A PCR Not Detected     Influenza B PCR Not Detected    Narrative:      Fact sheet for providers: https://www.fda.gov/media/496503/download    Fact sheet for patients: https://www.fda.gov/media/017100/download    Test performed by PCR.    Blood Culture - Blood, Arm, Left [[id number]] Collected: 02/19/21 1900    Lab Status: Final result Specimen: Blood from Arm, Left Updated: 02/24/21 1930     Blood Culture No growth at 5 days    Blood Culture - Blood, Arm, Right [[id number]] Collected: 02/19/21 1850    Lab Status: Final result Specimen: Blood from Arm, Right Updated: 02/24/21 1930     Blood Culture No growth at 5 days              Radiology:  Imaging Results (Last 72 Hours)     ** No results found for the last 72 hours. **      Left thigh ultrasound reveals a developing abscess      Impression:   1. Left  groin cellulitis/Abscess-Status post debridement with cultures pending.  The purulent material was not malodorous, suggesting there is not a major anaerobic component of infection.  2. Elevated procalcitonin  3. Encephalopathy, improving  4. Rheumatoid arthritis  5. Sjogren's disease  6. DVT/Xarelto  7. MAHESH/CPAP  8. CKD IIIa  9. Hypothyroidism  10. Essential hypertension  11. Morbid Obesity  12. Ongoing tobacco abuse  13. Pcn allergy (hives and shortness of breath).  Has tolerated Keflex in the remote past.   14. Funguria-I will place her on fluconazole    PLAN/RECOMMENDATIONS:   1.  Continue daptomycin  2.  Continue Ceftriaxone   3.  Left thigh wound care  4.  Fluconazole 200 mg by mouth daily          Som Martinez MD   3/1/2021  08:22 EST

## 2021-03-01 NOTE — PROGRESS NOTES
Continued Stay Note  [facility]     Patient Name: Raquel Carranza  MRN: [id number]  Today's Date: 3/1/2021    Admit Date: 2/19/2021    Discharge Plan     Row Name 03/01/21 1553       Plan    Plan  [facility] vs Home Health    Patient/Family in Agreement with Plan  yes    Plan Comments  Spoke with patient at bedside.  She lives alone and does not have good social support.  Currently on 70L/70% HFNC.  Patient had BID dressing changes.  Discussed discharge plan with patient, explained home health will only visit 2-3x per week, not 2x per day every day for her dressing changes. She is agreeable for [facility] if they are able to accept. Patient on HFNC, not medically ready for discharge.    Final Discharge Disposition Code  30 - still a patient        Discharge Codes    No documentation.       Expected Discharge Date and Time     Expected Discharge Date Expected Discharge Time    Mar 5, 2021             Maegan Love RN

## 2021-03-02 LAB
ANION GAP SERPL CALCULATED.3IONS-SCNC: 6 MMOL/L (ref 5–15)
BACTERIA SPEC AEROBE CULT: NORMAL
BUN SERPL-MCNC: 8 MG/DL (ref 8–23)
BUN/CREAT SERPL: 11 (ref 7–25)
CALCIUM SPEC-SCNC: 8.7 MG/DL (ref 8.6–10.5)
CHLORIDE SERPL-SCNC: 103 MMOL/L (ref 98–107)
CO2 SERPL-SCNC: 32 MMOL/L (ref 22–29)
CREAT SERPL-MCNC: 0.73 MG/DL (ref 0.57–1)
DEPRECATED RDW RBC AUTO: 53.2 FL (ref 37–54)
ERYTHROCYTE [DISTWIDTH] IN BLOOD BY AUTOMATED COUNT: 13.1 % (ref 12.3–15.4)
GFR SERPL CREATININE-BSD FRML MDRD: 80 ML/MIN/1.73
GLUCOSE SERPL-MCNC: 98 MG/DL (ref 65–99)
GRAM STN SPEC: NORMAL
GRAM STN SPEC: NORMAL
HCT VFR BLD AUTO: 37.2 % (ref 34–46.6)
HGB BLD-MCNC: 11.3 G/DL (ref 12–15.9)
MCH RBC QN AUTO: 33.3 PG (ref 26.6–33)
MCHC RBC AUTO-ENTMCNC: 30.4 G/DL (ref 31.5–35.7)
MCV RBC AUTO: 109.7 FL (ref 79–97)
PLATELET # BLD AUTO: 230 10*3/MM3 (ref 140–450)
PMV BLD AUTO: 9.5 FL (ref 6–12)
POTASSIUM SERPL-SCNC: 4.2 MMOL/L (ref 3.5–5.2)
RBC # BLD AUTO: 3.39 10*6/MM3 (ref 3.77–5.28)
SODIUM SERPL-SCNC: 141 MMOL/L (ref 136–145)
WBC # BLD AUTO: 8.7 10*3/MM3 (ref 3.4–10.8)

## 2021-03-02 PROCEDURE — 94799 UNLISTED PULMONARY SVC/PX: CPT

## 2021-03-02 PROCEDURE — 97535 SELF CARE MNGMENT TRAINING: CPT

## 2021-03-02 PROCEDURE — 97530 THERAPEUTIC ACTIVITIES: CPT

## 2021-03-02 PROCEDURE — 25010000002 MORPHINE PER 10 MG: Performed by: SURGERY

## 2021-03-02 PROCEDURE — 85027 COMPLETE CBC AUTOMATED: CPT | Performed by: INTERNAL MEDICINE

## 2021-03-02 PROCEDURE — 25010000002 DAPTOMYCIN PER 1 MG: Performed by: INTERNAL MEDICINE

## 2021-03-02 PROCEDURE — 25010000002 CEFTRIAXONE PER 250 MG: Performed by: INTERNAL MEDICINE

## 2021-03-02 PROCEDURE — 99233 SBSQ HOSP IP/OBS HIGH 50: CPT | Performed by: INTERNAL MEDICINE

## 2021-03-02 PROCEDURE — 94660 CPAP INITIATION&MGMT: CPT

## 2021-03-02 PROCEDURE — 80048 BASIC METABOLIC PNL TOTAL CA: CPT | Performed by: INTERNAL MEDICINE

## 2021-03-02 RX ADMIN — BACLOFEN 10 MG: 10 TABLET ORAL at 16:51

## 2021-03-02 RX ADMIN — LEVOTHYROXINE SODIUM 137 MCG: 0.14 TABLET ORAL at 06:40

## 2021-03-02 RX ADMIN — SODIUM HYPOCHLORITE: 1.25 SOLUTION TOPICAL at 20:43

## 2021-03-02 RX ADMIN — GABAPENTIN 300 MG: 300 CAPSULE ORAL at 09:48

## 2021-03-02 RX ADMIN — POLYETHYLENE GLYCOL 3350 17 G: 17 POWDER, FOR SOLUTION ORAL at 09:47

## 2021-03-02 RX ADMIN — BACLOFEN 10 MG: 10 TABLET ORAL at 20:43

## 2021-03-02 RX ADMIN — SODIUM CHLORIDE, PRESERVATIVE FREE 10 ML: 5 INJECTION INTRAVENOUS at 09:49

## 2021-03-02 RX ADMIN — DOCUSATE SODIUM 100 MG: 100 CAPSULE, LIQUID FILLED ORAL at 20:42

## 2021-03-02 RX ADMIN — MORPHINE SULFATE 4 MG: 4 INJECTION, SOLUTION INTRAMUSCULAR; INTRAVENOUS at 15:33

## 2021-03-02 RX ADMIN — BACLOFEN 10 MG: 10 TABLET ORAL at 09:48

## 2021-03-02 RX ADMIN — FAMOTIDINE 20 MG: 20 TABLET, FILM COATED ORAL at 06:40

## 2021-03-02 RX ADMIN — MAGNESIUM HYDROXIDE 15 ML: 2400 SUSPENSION ORAL at 11:35

## 2021-03-02 RX ADMIN — MULTIVITAMIN TABLET 1 TABLET: TABLET at 09:48

## 2021-03-02 RX ADMIN — SODIUM HYPOCHLORITE: 1.25 SOLUTION TOPICAL at 14:22

## 2021-03-02 RX ADMIN — METRONIDAZOLE 500 MG: 500 INJECTION, SOLUTION INTRAVENOUS at 15:40

## 2021-03-02 RX ADMIN — CEFTRIAXONE SODIUM 2 G: 2 INJECTION, POWDER, FOR SOLUTION INTRAMUSCULAR; INTRAVENOUS at 16:51

## 2021-03-02 RX ADMIN — FERROUS SULFATE TAB 325 MG (65 MG ELEMENTAL FE) 325 MG: 325 (65 FE) TAB at 09:48

## 2021-03-02 RX ADMIN — DAPTOMYCIN 650 MG: 500 INJECTION, POWDER, LYOPHILIZED, FOR SOLUTION INTRAVENOUS at 14:21

## 2021-03-02 RX ADMIN — OXYCODONE HYDROCHLORIDE AND ACETAMINOPHEN 1 TABLET: 5; 325 TABLET ORAL at 22:06

## 2021-03-02 RX ADMIN — GABAPENTIN 300 MG: 300 CAPSULE ORAL at 20:43

## 2021-03-02 RX ADMIN — DULOXETINE HYDROCHLORIDE 30 MG: 30 CAPSULE, DELAYED RELEASE ORAL at 09:48

## 2021-03-02 RX ADMIN — ASPIRIN 81 MG: 81 TABLET, CHEWABLE ORAL at 09:48

## 2021-03-02 RX ADMIN — FLUCONAZOLE 200 MG: 200 TABLET ORAL at 09:48

## 2021-03-02 RX ADMIN — FAMOTIDINE 20 MG: 20 TABLET, FILM COATED ORAL at 16:51

## 2021-03-02 RX ADMIN — METRONIDAZOLE 500 MG: 500 INJECTION, SOLUTION INTRAVENOUS at 20:43

## 2021-03-02 RX ADMIN — DOCUSATE SODIUM 100 MG: 100 CAPSULE, LIQUID FILLED ORAL at 09:48

## 2021-03-02 RX ADMIN — ALLOPURINOL 300 MG: 300 TABLET ORAL at 09:48

## 2021-03-02 NOTE — PROGRESS NOTES
Continued Stay Note  [facility]     Patient Name: Raquel Carranza  MRN: [id number]  Today's Date: 3/2/2021    Admit Date: 2/19/2021    Discharge Plan     Row Name 03/02/21 1606       Plan    Plan  IPR    Patient/Family in Agreement with Plan  other (see comments)    Plan Comments  Referral called to [facility].  Per April with [facility], patient may be able to go to their acute rehab unit.  April will need to know if patient will be on Xarelto, specific wound care orders, antibiotic stop dates and patient will need to be on PO pain meds for dressing changes.  Will f/u tomorrow.    Final Discharge Disposition Code  62 - inpatient rehab facility        Discharge Codes    No documentation.       Expected Discharge Date and Time     Expected Discharge Date Expected Discharge Time    Mar 5, 2021             Maegan Love RN

## 2021-03-02 NOTE — THERAPY TREATMENT NOTE
Patient Name: Raquel Carranza  : 1955    MRN: [id number]                              Today's Date: 3/2/2021       Admit Date: 2021    Visit Dx:     ICD-10-CM ICD-9-CM   1. Sepsis, due to unspecified organism, unspecified whether acute organ dysfunction present (CMS/HCC)  A41.9 038.9     995.91   2. Left leg cellulitis  L03.116 682.6   3. Abscess of left thigh  L02.416 682.6     Patient Active Problem List   Diagnosis   • History of deep vein thrombosis of lower extremity   • Special screening for malignant neoplasms, colon   • Sepsis due to cellulitis (CMS/HCC)   • RAFA (acute kidney injury) (CMS/HCC)   • Arrhythmia   • Hypertension   • 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 rheumatoid factor (CMS/HCC)   • Sjogren's disease (CMS/HCC)   • Acquired hypothyroidism   • CKD (chronic kidney disease)   • Altered mental status   • Hypoxia   • Word finding difficulty     Past Medical History:   Diagnosis Date   • Abnormal glucose    • Acquired hypothyroidism    • Anxiety    • Arrhythmia    • Arthritis    • Bilateral edema of lower extremity    • Chronic joint pain    • Hypertension    • Iron deficiency anemia    • Irritable bowel syndrome    • 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 rheumatoid factor (CMS/HCC)    • Sjogren's disease (CMS/HCC)    • Thrombosis of right saphenous vein    • Vitamin D deficiency      Past Surgical History:   Procedure Laterality Date   • APPENDECTOMY     • HERNIA REPAIR     • LEG EXCISION LESION/CYST Left 2021    Procedure: INCISION AND DRAINAGE LEFT THIGH ABCESS;  Surgeon: Noe Ken MD;  Location:  [facility];  Service: General;  Laterality: Left;   • LEG EXCISION LESION/CYST Left 2021    Procedure: WOUND EXPLORATION LEFT THIGH FOR CONTROL OF BLEEDING;  Surgeon: Sreedhar Bobby MD;  Location:  [facility];  Service: General;  Laterality: Left;     General Information     Row Name 21 1311          OT Time and  Intention    Document Type  therapy note (daily note)  -KF     Mode of Treatment  occupational therapy  -KF     Row Name 03/02/21 1311          General Information    Patient Profile Reviewed  yes  -KF     Existing Precautions/Restrictions  fall;oxygen therapy device and L/min;other (see comments) 6L, L thigh incision; use of bariatric walker preferred  -KF     Barriers to Rehab  medically complex  -KF     Row Name 03/02/21 1311          Cognition    Orientation Status (Cognition)  oriented x 4  -KF     Row Name 03/02/21 1311          Safety Issues, Functional Mobility    Safety Issues Affecting Function (Mobility)  insight into deficits/self-awareness;safety precaution awareness  -KF     Impairments Affecting Function (Mobility)  endurance/activity tolerance;pain;range of motion (ROM);shortness of breath;strength  -KF       User Key  (r) = Recorded By, (t) = Taken By, (c) = Cosigned By    Initials Name Provider Type    KF Faustina Pérez OT Occupational Therapist          Mobility/ADL's     Row Name 03/02/21 1312          Bed Mobility    Bed Mobility  sit-supine  -KF     Sit-Supine [place] (Bed Mobility)  minimum assist (75% patient effort);verbal cues;nonverbal cues (demo/gesture)  -KF     Bed Mobility, Safety Issues  decreased use of arms for pushing/pulling;decreased use of legs for bridging/pushing  -KF     Assistive Device (Bed Mobility)  bed rails;draw sheet  -KF     Comment (Bed Mobility)  cues for sidelying then assist to bring LEs up into bed  -KF     Row Name 03/02/21 1312          Transfers    Transfers  sit-stand transfer;toilet transfer  -KF     Comment (Transfers)  cues for HP upon chair armrests which improved safety and I progressing to CGA x2 then CGA x1 assist with bariatric FWW, STS x2 reps from chair and then BSC  -KF     Chair-Bed [place] (Transfers)  contact guard;1 person to manage equipment  -KF     Sit-Stand [place] (Transfers)  contact guard;1 person to manage equipment   -KF     [place] Level (Toilet Transfer)  contact guard;1 person to manage equipment  -KF     Assistive Device (Toilet Transfer)  walker, front-wheeled;bariatric equipment  -KF     Row Name 03/02/21 1312          Sit-Stand Transfer    Assistive Device (Sit-Stand Transfers)  walker, front-wheeled;bariatric  -KF     Row Name 03/02/21 1312          Toilet Transfer    Type (Toilet Transfer)  stand pivot/stand step  -     Row Name 03/02/21 1312          Functional Mobility    Functional Mobility- Ind. Level  contact guard assist;1 person + 1 person to manage equipment;verbal cues required  -KF     Functional Mobility- Device  rolling walker  -KF     Functional Mobility-Distance (Feet)  90  -KF     Functional Mobility- Safety Issues  supplemental O2;step length decreased  -KF     Functional Mobility- Comment  good progress no LOB or seated rest breaks required, 1 standing rest break  -     Row Name 03/02/21 1312          Activities of Daily Living    BADL Assessment/Intervention  toileting;upper body dressing  -     Row Name 03/02/21 1312          Upper Body Dressing Assessment/Training    [place] Level (Upper Body Dressing)  front opening garment;minimum assist (75% patient effort);don;doff  -KF     Position (Upper Body Dressing)  edge of bed sitting;unsupported sitting  -KF     Comment (Upper Body Dressing)  donned in chair and doffed EOB  -     Row Name 03/02/21 1312          Chair-Bed Transfer    Assistive Device (Chair-Bed Transfers)  walker, front-wheeled;bariatric  -KF     Row Name 03/02/21 1312          Toileting Assessment/Training    [place] Level (Toileting)  adjust/manage clothing;minimum assist (75% patient effort);perform perineal hygiene;maximum assist (25% patient effort)  -KF     Position (Toileting)  supported standing  -KF     Comment (Toileting)  education on toileting aid to progress, reports has toilet aid but didn't use prior could benefit from further training and education in  future sessions  -KF       User Key  (r) = Recorded By, (t) = Taken By, (c) = Cosigned By    Initials Name Provider Type    Faustina Garcia OT Occupational Therapist        Obj/Interventions     Row Name 03/02/21 1316          Balance    Balance Assessment  sitting static balance;sitting dynamic balance;standing static balance;standing dynamic balance  -KF     Static Sitting Balance  WNL;unsupported;sitting in chair;sitting, edge of bed  -KF     Dynamic Sitting Balance  WFL;unsupported;sitting in chair;sitting, edge of bed  -KF     Static Standing Balance  WFL;supported;standing  -KF     Dynamic Standing Balance  mild impairment;supported;standing  -KF     Balance Interventions  sitting;standing;weight shifting activity;UE activity with balance activity;occupation based/functional task  -KF       User Key  (r) = Recorded By, (t) = Taken By, (c) = Cosigned By    Initials Name Provider Type    Faustina Garcia, OT Occupational Therapist        Goals/Plan    No documentation.       Clinical Impression     Row Name 03/02/21 1317          Pain Assessment    Additional Documentation  Pain Scale: Numbers Pre/Post-Treatment (Group)  -KF     Row Name 03/02/21 1317          Pain Scale: Numbers Pre/Post-Treatment    Pretreatment Pain Rating  0/10 - no pain  -KF     Posttreatment Pain Rating  0/10 - no pain  -KF     Pre/Posttreatment Pain Comment  tolerated  -KF     Pain Intervention(s)  Ambulation/increased activity;Repositioned  -     Row Name 03/02/21 1317          Plan of Care Review    Plan of Care Reviewed With  patient  -KF     Progress  improving  -     Outcome Summary  Pt demonstrated good progress today, CGA+1 functional mob 90ft at bariatric FWW, CGA STS x2 reps, Len to return to supine from sitting EOB with improvement with log rolling technique, completed toilet clothing management Len, max A toilet hygiene with ed provided on AE, Len UBD overall, cont IPOT per POC  -KF     Row Name 03/02/21 2087           Therapy Assessment/Plan (OT)    Rehab Potential (OT)  good, to achieve stated therapy goals  -KF     Criteria for Skilled Therapeutic Interventions Met (OT)  yes;meets criteria;skilled treatment is necessary  -KF     Therapy Frequency (OT)  daily  -KF     Row Name 03/02/21 1317          Therapy Plan Review/Discharge Plan (OT)    Equipment Needs Upon Discharge (OT)  bariatric equipment;walker, rolling  -KF     Anticipated Discharge Disposition (OT)  inpatient rehabilitation facility  -KF     Row Name 03/02/21 1317          Vital Signs    Pre Systolic BP Rehab  -- RN cleared VSS  -KF     Pre SpO2 (%)  95  -KF     O2 Delivery Pre Treatment  supplemental O2 6L  -KF     Post SpO2 (%)  94  -KF     O2 Delivery Post Treatment  supplemental O2  -KF     Pre Patient Position  Sitting  -KF     Intra Patient Position  Standing  -KF     Post Patient Position  Supine  -KF     Row Name 03/02/21 1317          Positioning and Restraints    Pre-Treatment Position  sitting in chair/recliner  -KF     Post Treatment Position  bed  -KF     In Bed  notified nsg;supine;with other staff;call light within reach;encouraged to call for assist;side rails up x2;legs elevated;fowlers  -KF       User Key  (r) = Recorded By, (t) = Taken By, (c) = Cosigned By    Initials Name Provider Type    KF Faustina Pérez, OT Occupational Therapist        Outcome Measures     Row Name 03/02/21 1320          How much help from another is currently needed...    Putting on and taking off regular lower body clothing?  2  -KF     Bathing (including washing, rinsing, and drying)  3  -KF     Toileting (which includes using toilet bed pan or urinal)  2  -KF     Putting on and taking off regular upper body clothing  3  -KF     Taking care of personal grooming (such as brushing teeth)  3  -KF     Eating meals  4  -KF     AM-PAC 6 Clicks Score (OT)  17  -KF     Row Name 03/02/21 1320          Functional Assessment    Outcome Measure Options  AM-PAC 6 Clicks  Daily Activity (OT)  -KF       User Key  (r) = Recorded By, (t) = Taken By, (c) = Cosigned By    Initials Name Provider Type    Faustina Garcia OT Occupational Therapist        Occupational Therapy Education                 Title: PT OT SLP Therapies (In Progress)     Topic: Occupational Therapy (Done)     Point: ADL training (Done)     Description:   Instruct learner(s) on proper safety adaptation and remediation techniques during self care or transfers.   Instruct in proper use of assistive devices.              Learning Progress Summary           Patient Acceptance, E,TB,D, VU,DU by  at 3/2/2021 1320    Acceptance, E,TB,D, VU,DU by  at 2/26/2021 0942    Acceptance, E, VU,NR by  at 2/23/2021 0949    Acceptance, E,TB,D, VU,DU by  at 2/20/2021 1514                   Point: Home exercise program (Done)     Description:   Instruct learner(s) on appropriate technique for monitoring, assisting and/or progressing therapeutic exercises/activities.              Learning Progress Summary           Patient Acceptance, E,TB,D, VU,DU by  at 3/2/2021 1320    Acceptance, E,TB,D, VU,DU by  at 2/26/2021 0942    Acceptance, E,TB,D, VU,DU by  at 2/20/2021 1514                   Point: Precautions (Done)     Description:   Instruct learner(s) on prescribed precautions during self-care and functional transfers.              Learning Progress Summary           Patient Acceptance, E,TB,D, VU,DU by KF at 3/2/2021 1320    Acceptance, E,TB,D, VU,DU by  at 2/26/2021 0942    Acceptance, E, VU,NR by  at 2/23/2021 0949    Acceptance, E,TB,D, VU,DU by  at 2/20/2021 1514                   Point: Body mechanics (Done)     Description:   Instruct learner(s) on proper positioning and spine alignment during self-care, functional mobility activities and/or exercises.              Learning Progress Summary           Patient Acceptance, E,TB,D, VU,DU by KF at 3/2/2021 1320    Acceptance, E,TB,D, VU,DU by  at 2/26/2021 0942     Acceptance, E, VU,NR by  at 2/23/2021 0949    Acceptance, E,TB,D, VU,DU by  at 2/20/2021 1514                               User Key     Initials Effective Dates Name Provider Type Discipline    KF 04/03/18 -  Faustina Pérez, OT Occupational Therapist OT     07/18/19 -  Tiny Casillas OT Occupational Therapist OT              OT Recommendation and Plan  Planned Therapy Interventions (OT): activity tolerance training, functional balance retraining, occupation/activity based interventions, ROM/therapeutic exercise, strengthening exercise, transfer/mobility retraining, BADL retraining, patient/caregiver education/training  Therapy Frequency (OT): daily  Plan of Care Review  Plan of Care Reviewed With: patient  Progress: improving  Outcome Summary: Pt demonstrated good progress today, CGA+1 functional mob 90ft at bariatric FWW, CGA STS x2 reps, Len to return to supine from sitting EOB with improvement with log rolling technique, completed toilet clothing management Len, max A toilet hygiene with ed provided on AE, Len UBD overall, cont IPOT per POC     Time Calculation:   Time Calculation- OT     Row Name 03/02/21 1105             Time Calculation- OT    OT Start Time  1105  -      OT Received On  03/02/21  -      OT Goal Re-Cert Due Date  03/12/21  -         Timed Charges    64115 - OT Therapeutic Activity Minutes  22  -KF      41664 - OT Self Care/Mgmt Minutes  16  -KF        User Key  (r) = Recorded By, (t) = Taken By, (c) = Cosigned By    Initials Name Provider Type     Faustina Pérez OT Occupational Therapist        Therapy Charges for Today     Code Description Service Date Service Provider Modifiers Qty    52724828088 HC OT THERAPEUTIC ACT EA 15 MIN 3/2/2021 Faustina Pérez OT GO 2    86205359349 HC OT SELF CARE/MGMT/TRAIN EA 15 MIN 3/2/2021 Faustina Pérez OT GO 1    79043612133 HC OT THER SUPP EA 15 MIN 3/2/2021 Faustina Pérez OT GO 2               Faustina Pérez  OT  3/2/2021

## 2021-03-02 NOTE — PROGRESS NOTES
[facility]  PROGRESS NOTE    Patient Name: Raquel Carranza  : 1955  MRN: [id number]    Date of Admission: 2021  Primary Care Physician: Sommer Paniagua MD    Subjective   Subjective     CC:  L groin pain    HPI:  No issues overnight, O2 was weaned to 6L BNC    ROS:  Gen- No fevers, chills  CV- No chest pain, palpitations  Resp- No cough, dyspnea  GI- No N/V/D, abd pain        Objective   Objective     Vital Signs:   Temp:  [97.7 °F (36.5 °C)] 97.7 °F (36.5 °C)  Heart Rate:  [53-63] 58  Resp:  [20] 20  BP: (101-120)/(59-68) 120/68  Total (NIH Stroke Scale): 0     Physical Exam:  Constitutional: No acute distress, awake, alert, morbidly obese WF  HENT: NCAT, mucous membranes moist  Respiratory: Clear to auscultation bilaterally, respiratory effort normal on 6L supplemental O2 this am  Cardiovascular: RRR, no murmurs, rubs, or gallops  Gastrointestinal: Positive bowel sounds, soft, nontender, nondistended  Musculoskeletal: No bilateral ankle edema  Psychiatric: Appropriate affect, cooperative  Neurologic: Oriented x 3, strength symmetric in all extremities, Cranial Nerves grossly intact to confrontation, speech clear  Skin: Left groin wound site with dressing in place c/d/i    Results Reviewed:  Results from last 7 days   Lab Units 21  0452 21  0550 212  21  0438   WBC 10*3/mm3 8.70 9.93 12.63*   < > 9.81   HEMOGLOBIN g/dL 11.3* 11.2* 12.2   < > 15.1   HEMATOCRIT % 37.2 36.8 40.4   < > 49.4*   PLATELETS 10*3/mm3 230 224 227   < > 241   INR   --   --   --   --  1.26*    < > = values in this interval not displayed.     Results from last 7 days   Lab Units 21  0452 21  0550 21  0442   SODIUM mmol/L 141 139 139   POTASSIUM mmol/L 4.2 4.0 4.4   CHLORIDE mmol/L 103 102 103   CO2 mmol/L 32.0* 31.0* 29.0   BUN mg/dL 8 7* 10   CREATININE mg/dL 0.73 0.70 0.74   GLUCOSE mg/dL 98 94 90   CALCIUM mg/dL 8.7 8.6 8.8     Estimated  Creatinine Clearance: 113.1 mL/min (by C-G formula based on SCr of 0.73 mg/dL).    Microbiology Results Abnormal     Procedure Component Value - Date/Time    Tissue / Bone Culture - Tissue, Thigh, Left [[id number]] Collected: 02/27/21 1402    Lab Status: Final result Specimen: Tissue from Thigh, Left Updated: 03/02/21 0742     Tissue Culture No growth at 3 days     Gram Stain Many (4+) WBCs seen      No organisms seen    Anaerobic Culture - Tissue, Thigh, Left [[id number]] Collected: 02/27/21 1402    Lab Status: Preliminary result Specimen: Tissue from Thigh, Left Updated: 03/02/21 0712     Anaerobic Culture No anaerobes isolated at 3 days    Anaerobic Culture - Tissue, Thigh, Left [[id number]]  (Abnormal) Collected: 02/26/21 1524    Lab Status: Final result Specimen: Tissue from Thigh, Left Updated: 03/01/21 1246     Anaerobic Culture Prevotella loescheii    Wound Culture - Swab, Thigh, Left [[id number]] Collected: 02/26/21 1524    Lab Status: Final result Specimen: Swab from Thigh, Left Updated: 03/01/21 0620     Wound Culture No growth at 3 days     Gram Stain No organisms seen      Many (4+) WBCs per low power field      Rare (1+) Epithelial cells per low power field    AFB Culture - Tissue, Thigh, Left [[id number]] Collected: 02/27/21 1402    Lab Status: Preliminary result Specimen: Tissue from Thigh, Left Updated: 02/28/21 1034     AFB Stain No acid fast bacilli seen on concentrated smear    Urine Culture - Urine, Urine, Clean Catch [[id number]]  (Abnormal) Collected: 02/25/21 1611    Lab Status: Final result Specimen: Urine, Clean Catch Updated: 02/26/21 1300     Urine Culture Yeast isolated     Comment: No further workup.       Blood Culture - Blood, Arm, Left [[id number]] Collected: 02/19/21 1900    Lab Status: Final result Specimen: Blood from Arm, Left Updated: 02/24/21 1930     Blood Culture No growth at 5 days    Blood Culture - Blood, Arm, Right [[id number]] Collected: 02/19/21 1850    Lab Status: Final result  Specimen: Blood from Arm, Right Updated: 02/24/21 1930     Blood Culture No growth at 5 days    COVID-19 and FLU A/B PCR - Swab, Nasopharynx [[id number]]  (Normal) Collected: 02/19/21 1911    Lab Status: Final result Specimen: Swab from Nasopharynx Updated: 02/19/21 2143     COVID19 Not Detected     Influenza A PCR Not Detected     Influenza B PCR Not Detected    Narrative:      Fact sheet for providers: https://www.fda.gov/media/174583/download    Fact sheet for patients: https://www.fda.gov/media/179394/download    Test performed by PCR.          Imaging Results (Last 24 Hours)     ** No results found for the last 24 hours. **          Results for orders placed during the hospital encounter of 02/19/21   Adult Transthoracic Echo Complete W/ Cont if Necessary Per Protocol (With Agitated Saline)    Narrative · Left ventricular ejection fraction appears to be 61 - 65%. Left   ventricular systolic function is normal.  · Left ventricular diastolic function was normal.          I have reviewed the medications:  Scheduled Meds:Pharmacy Consult, , Does not apply, BID  allopurinol, 300 mg, Oral, Daily  aspirin, 81 mg, Oral, Daily  baclofen, 10 mg, Oral, TID  cefTRIAXone, 2 g, Intravenous, Q24H  docusate sodium, 100 mg, Oral, BID  DULoxetine, 30 mg, Oral, Daily  famotidine, 20 mg, Oral, BID AC  ferrous sulfate, 325 mg, Oral, Daily With Breakfast  fluconazole, 200 mg, Oral, Q24H  gabapentin, 300 mg, Oral, Q12H  levothyroxine, 137 mcg, Oral, Q AM  metoprolol succinate XL, 25 mg, Oral, Daily  metroNIDAZOLE, 500 mg, Intravenous, Q6H  multivitamin, 1 tablet, Oral, Daily  polyethylene glycol, 17 g, Oral, Daily  sodium chloride, 10 mL, Intravenous, Q12H  sodium hypochlorite, , Topical, Q12H  Vitamin D3, 50,000 Units, Oral, Q7 Days      Continuous Infusions:   PRN Meds:.acetaminophen  •  aluminum-magnesium hydroxide-simethicone  •  hydrOXYzine  •  magnesium sulfate **OR** magnesium sulfate **OR** magnesium sulfate  •  Morphine  •   oxyCODONE-acetaminophen  •  sodium chloride  •  Insert peripheral IV **AND** sodium chloride  •  sodium chloride    Assessment/Plan   Assessment & Plan     Active Hospital Problems    Diagnosis  POA   • **Sepsis due to cellulitis (CMS/[facility]) [L03.90, A41.9]  Yes   • RAFA (acute kidney injury) (CMS/[facility]) [N17.9]  Unknown   • Arrhythmia [I49.9]  Unknown   • Hypertension [I10]  Unknown   • 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 rheumatoid factor (CMS/[facility]) [M05.79]  Unknown   • Sjogren's disease (CMS/[facility]) [M35.00]  Unknown   • Acquired hypothyroidism [E03.9]  Unknown   • CKD (chronic kidney disease) [N18.9]  Unknown   • Altered mental status [R41.82]  Unknown   • Hypoxia [R09.02]  Unknown   • Word finding difficulty [R47.89]  Unknown   • History of deep vein thrombosis of lower extremity [Z86.718]  Not Applicable      Resolved Hospital Problems   No resolved problems to display.        Brief Hospital Course to date:  Raquel Carranza is a 65 y.o. female with h/o hypothyroidism, HTN, RA, Sjogren's disease, DVT on Xarelto, MAHESH/CPAP, CKD, and arrhythmia who presented with sepsis due to Left groin cellulitis.  Since admission, pt's L groin site has progressed to an abscess. She went to the OR with Dr. Ken on 2/26 for I&D.  Post op, pt had significant bleeding from wound site, so was taken back to the OR 2/27 with Dr. Bobby s/p electrocautery or suture ligation of multiple bleeding sites as well as I&D of wound as more necrotic tissue was noted.     This patient's problems and plans were partially entered by my partner and updated as appropriate by me 03/02/21.    Plan:    Sepsis/L groin cellulitis/abscess  --IV abx per ID  --ID following, initially assessed by surgery with no intervention warranted   --ultrasound and clinical exam on 2/25 c/w abscess. S/p I&D by Dr. Ken 2/26 and repeat I&D as well as control of bleeding sites by Dr. Bobby 2/27  --wound cultures with Prevotella      Funguria  --from  UCx obtained 2/25  --started on Fluconazole 2/26 by ID    Acute hypoxic respiratory failure  --thought secondary to volume overload s/p bumex  --ECHO with normal EF  --hypoxia likely due to OHS/MAHESH  --complicated by body habitus  --relatively stable overall  --doubt PE given chronic AC    Probable CKD  --stable    Sjogren's/RA  --not actively on any immunosuppression    Hx of DVT  --on Xarelto at home, initially on Heparin gtt here due to ? Need for surgery then transitioned back to Xarelto. Held Xarelto for possible intervention. Resume once okay with surgery.   --Will d/w Dr. Ken today.     AMS  --new visual disturbance/dizziness  --neurology reconsulted, appreciate their assistance. They have now signed off.   --possibly due to medications  --likely has significant anxiety component, added atarax    Left flank pain  --resolved, checked UA/UCx on 2/25 and findings as mentioned above    DVT Prophylaxis: resume Xarelto when okay with Surgery (awaiting response from Dr. Ken)      Disposition: I expect the patient to be discharged to [facility] once bed available    CODE STATUS:   Code Status and Medical Interventions:   Ordered at: 02/19/21 2225     Code Status:    CPR     Medical Interventions (Level of Support Prior to Arrest):    Full       Uyen Martinez MD  03/02/21

## 2021-03-02 NOTE — PROGRESS NOTES
INFECTIOUS DISEASE Progress Note    Raquel Carranza  1955  [id number]      Admission Date: 2/19/2021      Requesting Provider: Brock Mata MD  Evaluating Physician: Som Martinez MD    Reason for Consultation: Sepsis    History of present illness:    2/20/21: Patient is a 65 y.o. female with h/o hypothyroidism, HTN, RA, Sjogren's disease, DVT/Xarelto, MAHESH/CPAP, CKD, and arrhythmia who we were asked to see for sepsis.  She presented to [facility] ED on 2/19/21 with fever and left groin pain for the past 3 days.  She noted a boil that started in her left groin about 3 days ago that increased in size and became more painful.  She became more confused and had difficulty with word-finding capability.  She developed a fever of 103 at home yesterday and her son brought her to ED for further evaluation.  Initial evaluation showed Tmax 103, , PCT 4.46, Creatinine 1.22 (baseline about 1.2), WBC 17,900 with 81% segs/6% bands, and UA WBC 21-30 with small LE and negative nitrite.  A COVID-19 PCR was negative. Blood cultures are pending.  CT scan of neck and head along with MRI ruled out acute stroke.  A CXR is unremarkable.  A CT scan of LLE and pelvis showed left upper thigh cellulitis extending to left obturator muscle with no evidence of abscess.  She was started on Merrem and Vancomycin.  ID was asked to evaluate and manage her antibiotic therapy.     2/21/21: Her maximum temperature over the last 24 hours is 100.3. Her blood cultures have remained negative.  She states that she is allergic to penicillin but has previously tolerated Keflex.  She has decreased left groin/medial thigh pain.    2/22/21: She feels better today.  She has decreased left medial thigh.  She has remained afebrile.  She denies nausea and vomiting.    2/23/21:  She has remained afebrile. White blood cell count this morning is 12.9. She has decreased left medial thigh pain and swelling.    2/24/21: She has remained afebrile. She has decreased  left medial thigh pain.  Ultrasound today revealed a developing abscess.    2/25/21: She has remained afebrile. She has decreased left medial thigh pain.  She denies cough and sputum production.    2/26/21: She is scheduled for operative debridement of her left thigh abscess. She has remained afebrile. Her urinalysis reveals 31-50 white blood cells and 2+ yeast.  She denies increased left thigh pain.    2/27/21: She underwent operative debridement of her left thigh abscess yesterday.  She has remained afebrile overnight. Her abscess cultures are no growth so far.  The material was not malodorous.    3/1/21: She was taken back to the operating room on 2/27 for continued wound bleeding. Additional necrotic tissue was sent for culture. Abscess cultures from 2/26 were negative but antibiotic modified. Her hemoglobin this morning is 11.2 and her white blood cell count is 9.9. She has remained afebrile.      3/2/21: She has remained afebrile. Her anaerobic abscess cultures have grown Prevotella. She has decreased left thigh pain she denies nausea and vomiting.          Past Medical History:   Diagnosis Date   • Abnormal glucose    • Acquired hypothyroidism    • Anxiety    • Arrhythmia    • Arthritis    • Bilateral edema of lower extremity    • Chronic joint pain    • Hypertension    • Iron deficiency anemia    • Irritable bowel syndrome    • 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 rheumatoid factor (CMS/HCC)    • Sjogren's disease (CMS/HCC)    • Thrombosis of right saphenous vein    • Vitamin D deficiency        Past Surgical History:   Procedure Laterality Date   • APPENDECTOMY     • HERNIA REPAIR     • LEG EXCISION LESION/CYST Left 2/26/2021    Procedure: INCISION AND DRAINAGE LEFT THIGH ABCESS;  Surgeon: Noe Ken MD;  Location: [facility];  Service: General;  Laterality: Left;   • LEG EXCISION LESION/CYST Left 2/27/2021    Procedure: WOUND EXPLORATION LEFT THIGH FOR CONTROL OF  BLEEDING;  Surgeon: Sreedhar Bobby MD;  Location: [facility];  Service: General;  Laterality: Left;       Family History   Problem Relation Age of Onset   • Cancer Mother         leukemia   • Hypertension Father    • Stroke Father    • Heart disease Father    • Cancer Father         colon   • Diabetes Brother    • Diabetes Paternal Aunt    • Diabetes Paternal Uncle        Social History     Socioeconomic History   • Marital status:      Spouse name: Not on file   • Number of children: Not on file   • Years of education: Not on file   • Highest education level: Not on file   Tobacco Use   • Smoking status: Current Every Day Smoker     Packs/day: 1.00     Types: Cigarettes   • Smokeless tobacco: Never Used   Substance and Sexual Activity   • Alcohol use: No   • Drug use: Never   • Sexual activity: Defer       Allergies   Allergen Reactions   • Ace Inhibitors    • Penicillins    • Adacel [Tetanus-Diphth-Acell Pertussis]      Preservative in the vaccine   • Lisinopril    • Zostavax [Zoster Vaccine Live]      Preservative in the vaccine   • Thimerosal Rash         Medication:    Current Facility-Administered Medications:   •  !Patient's home meds stored in pharmacy, , Does not apply, BID, Sreedhar Bobby MD  •  acetaminophen (TYLENOL) tablet 650 mg, 650 mg, Oral, Q6H PRN, Sreedhar Bobby MD, 650 mg at 02/28/21 1614  •  allopurinol (ZYLOPRIM) tablet 300 mg, 300 mg, Oral, Daily, Sreedhar Bobby MD, 300 mg at 03/01/21 0927  •  aluminum-magnesium hydroxide-simethicone (MAALOX MAX) 400-400-40 MG/5ML suspension 15 mL, 15 mL, Oral, Q6H PRN, Sreedhar Bobby MD, 15 mL at 02/19/21 2144  •  aspirin chewable tablet 81 mg, 81 mg, Oral, Daily, 81 mg at 03/01/21 0927 **OR** [DISCONTINUED] aspirin suppository 300 mg, 300 mg, Rectal, Daily, Noe Ken MD  •  baclofen (LIORESAL) tablet 10 mg, 10 mg, Oral, TID, Sreedhar Bobby MD, 10 mg at 03/01/21 2131  •  cefTRIAXone (ROCEPHIN) 2 g/100 mL 0.9% NS  IVPB (MBP), 2 g, Intravenous, Q24H, Sreedhar Bobby MD, 2 g at 03/01/21 1804  •  docusate sodium (COLACE) capsule 100 mg, 100 mg, Oral, BID, Sreedhar Bobby MD, 100 mg at 03/01/21 2131  •  DULoxetine (CYMBALTA) DR capsule 30 mg, 30 mg, Oral, Daily, Sreedhar Bobby MD, 30 mg at 03/01/21 0929  •  famotidine (PEPCID) tablet 20 mg, 20 mg, Oral, BID AC, Sreedhar Bobby MD, 20 mg at 03/02/21 0640  •  ferrous sulfate tablet 325 mg, 325 mg, Oral, Daily With Breakfast, Sreedhar Bobby MD, 325 mg at 03/01/21 0929  •  fluconazole (DIFLUCAN) tablet 200 mg, 200 mg, Oral, Q24H, Sreedhar Bobby MD, 200 mg at 03/01/21 0929  •  gabapentin (NEURONTIN) capsule 300 mg, 300 mg, Oral, Q12H, Sreedhar Bobby MD, 300 mg at 03/01/21 2131  •  hydrOXYzine (ATARAX) tablet 25 mg, 25 mg, Oral, TID PRN, Sreedhar Bobby MD, 25 mg at 03/01/21 2131  •  levothyroxine (SYNTHROID, LEVOTHROID) tablet 137 mcg, 137 mcg, Oral, Q AM, Sreedhar Bobby MD, 137 mcg at 03/02/21 0640  •  Magnesium Sulfate 2 gram Bolus, followed by 8 gram infusion (total Mg dose 10 grams)- Mg less than or equal to 1mg/dL, 2 g, Intravenous, PRN **OR** Magnesium Sulfate 2 gram / 50mL Infusion (GIVE X 3 BAGS TO EQUAL 6GM TOTAL DOSE) - Mg 1.1 - 1.5 mg/dl, 2 g, Intravenous, PRN **OR** Magnesium Sulfate 4 gram infusion- Mg 1.6-1.9 mg/dL, 4 g, Intravenous, PRN, Sreedhar Bobby MD, Last Rate: 25 mL/hr at 02/22/21 1243, 4 g at 02/22/21 1243  •  metoprolol succinate XL (TOPROL-XL) 24 hr tablet 25 mg, 25 mg, Oral, Daily, Sreedhar Bobby MD, 25 mg at 02/26/21 0943  •  Morphine sulfate (PF) injection 4 mg, 4 mg, Intravenous, Q12H PRN, Sreedhar Bobby MD, 4 mg at 03/01/21 1433  •  multivitamin (THERAGRAN) tablet 1 tablet, 1 tablet, Oral, Daily, Sreedhar Bobby MD, 1 tablet at 03/01/21 0929  •  oxyCODONE-acetaminophen (PERCOCET) 5-325 MG per tablet 1 tablet, 1 tablet, Oral, Q4H PRN, Sreedhar Bobby MD, 1 tablet at 03/01/21 2131  •  polyethylene glycol  (MIRALAX) packet 17 g, 17 g, Oral, Daily, Uyen Martinez MD, 17 g at 21 1418  •  sodium chloride 0.9 % flush 10 mL, 10 mL, Intravenous, PRN, Sreedhar Bobby MD  •  Insert peripheral IV, , , Once **AND** sodium chloride 0.9 % flush 10 mL, 10 mL, Intravenous, PRN, Sreedhar Bobby MD  •  sodium chloride 0.9 % flush 10 mL, 10 mL, Intravenous, Q12H, Sreedhar Bobby MD, 10 mL at 21 2131  •  sodium chloride 0.9 % flush 10 mL, 10 mL, Intravenous, PRN, Sreedhar Bobby MD  •  sodium hypochlorite (DAKIN'S 1/4 STRENGTH) 0.125 % topical solution 0.125% solution, , Topical, Q12H, Sreedhar Bobby MD, Stopped at 21  •  Vitamin D3 50,000 Units **PATIENT SUPPLIED MED**, 50,000 Units, Oral, Q7 Days, Sreedhar Bobby MD, 50,000 Units at 21 1741    Antibiotics:  Anti-Infectives (From admission, onward)    Ordered     Dose/Rate Route Frequency Start Stop    21 0734  fluconazole (DIFLUCAN) tablet 200 mg     Ordering Provider: Sreedhar Bobby MD    200 mg Oral Every 24 Hours 21 0900 21 0859    21 1458  cefTRIAXone (ROCEPHIN) 2 g/100 mL 0.9% NS IVPB (MBP)     Ordering Provider: Sreedhar Bobby MD    2 g  over 30 Minutes Intravenous Every 24 Hours 21 1600 21 1559    21 1226  DAPTOmycin (CUBICIN) 650 mg in sodium chloride 0.9 % 50 mL IVPB     Ordering Provider: Sreedhar Bobby MD    6 mg/kg × 105 kg (Adjusted)  100 mL/hr over 30 Minutes Intravenous Every 24 Hours 21 1315 21 1448    21 1851  meropenem (MERREM) 1 g/100 mL 0.9% NS VTB (mbp)     Ordering Provider: Amadou Templeton MD    1 g  over 30 Minutes Intravenous Once 21 185  vancomycin 3000 mg/500 mL 0.9% NS IVPB ([facility])     Ordering Provider: Amadou Templeton MD    20 mg/kg × 167 kg Intravenous Once 21 193            Review of Systems:  See HPI    Physical Exam:   Vital Signs  Temp (24hrs), Av.7 °F  (36.5 °C), Min:97.7 °F (36.5 °C), Max:97.7 °F (36.5 °C)    Temp  Min: 97.7 °F (36.5 °C)  Max: 97.7 °F (36.5 °C)  BP  Min: 97/62  Max: 101/59  Pulse  Min: 53  Max: 63  Resp  Min: 20  Max: 20  SpO2  Min: 92 %  Max: 98 %    GENERAL: Awake and alert, in mild distress. She is obese and debilitated appearing  HEENT: Normocephalic, atraumatic.  PERRL. EOMI. No conjunctival injection. No icterus. Oropharynx clear without evidence of thrush or exudate.   NECK: Supple without nuchal rigidity.  HEART: RRR; No murmur,    LUNGS: Diminished at lung bases bilaterally without wheezing, rales, rhonchi. Normal respiratory effort. Nonlabored.  ABDOMEN: Soft, nontender, nondistended. No rebound or guarding.   EXT: Left thigh packing is in place with slight blood on the wound packing  :  Without Marx catheter. With external urinary cath.    SKIN: No rashes.  Left groin as above.  NEURO: Oriented to PPT. Normal speech and cognition.   PSYCHIATRIC: Normal insight and judgment. Cooperative with PE    Laboratory Data    Results from last 7 days   Lab Units 03/02/21  0452 03/01/21  0550 02/28/21  0442   WBC 10*3/mm3 8.70 9.93 12.63*   HEMOGLOBIN g/dL 11.3* 11.2* 12.2   HEMATOCRIT % 37.2 36.8 40.4   PLATELETS 10*3/mm3 230 224 227     Results from last 7 days   Lab Units 03/02/21  0452   SODIUM mmol/L 141   POTASSIUM mmol/L 4.2   CHLORIDE mmol/L 103   CO2 mmol/L 32.0*   BUN mg/dL 8   CREATININE mg/dL 0.73   GLUCOSE mg/dL 98   CALCIUM mg/dL 8.7                     Results from last 7 days   Lab Units 02/27/21  0525   CK TOTAL U/L 21         Estimated Creatinine Clearance: 113.1 mL/min (by C-G formula based on SCr of 0.73 mg/dL).      Microbiology:  Microbiology Results (last 10 days)     Procedure Component Value - Date/Time    Tissue / Bone Culture - Tissue, Thigh, Left [[id number]] Collected: 02/27/21 1402    Lab Status: Preliminary result Specimen: Tissue from Thigh, Left Updated: 03/01/21 0912     Tissue Culture No growth at 2 days      Gram Stain Many (4+) WBCs seen      No organisms seen    AFB Culture - Tissue, Thigh, Left [[id number]] Collected: 02/27/21 1402    Lab Status: Preliminary result Specimen: Tissue from Thigh, Left Updated: 02/28/21 1034     AFB Stain No acid fast bacilli seen on concentrated smear    Anaerobic Culture - Tissue, Thigh, Left [[id number]]  (Abnormal) Collected: 02/26/21 1524    Lab Status: Final result Specimen: Tissue from Thigh, Left Updated: 03/01/21 1246     Anaerobic Culture Prevotella loescheii    Wound Culture - Swab, Thigh, Left [[id number]] Collected: 02/26/21 1524    Lab Status: Final result Specimen: Swab from Thigh, Left Updated: 03/01/21 0620     Wound Culture No growth at 3 days     Gram Stain No organisms seen      Many (4+) WBCs per low power field      Rare (1+) Epithelial cells per low power field    Urine Culture - Urine, Urine, Clean Catch [[id number]]  (Abnormal) Collected: 02/25/21 1611    Lab Status: Final result Specimen: Urine, Clean Catch Updated: 02/26/21 1300     Urine Culture Yeast isolated     Comment: No further workup.                 Radiology:  Imaging Results (Last 72 Hours)     ** No results found for the last 72 hours. **      Left thigh ultrasound reveals a developing abscess      Impression:   1. Left groin cellulitis/Abscess-Status post debridement.  Her cultures have grown Prevotella but were antibiotic modified and the purulent material was not malodorous at the time of surgery.  I will add anaerobic coverage with Flagyl.  2. Elevated procalcitonin  3. Encephalopathy, improving  4. Rheumatoid arthritis  5. Sjogren's disease  6. DVT/Xarelto  7. MAHESH/CPAP  8. CKD IIIa  9. Hypothyroidism  10. Essential hypertension  11. Morbid Obesity  12. Ongoing tobacco abuse  13. Pcn allergy (hives and shortness of breath).  Has tolerated Keflex in the remote past.   14. Funguria-on fluconazole therapy.    PLAN/RECOMMENDATIONS:   1.  Continue daptomycin  2.  Continue Ceftriaxone   3.  Left thigh  wound care  4.  Fluconazole 200 mg by mouth daily  5.  Flagyl 500 mg IV every 8 hours          Som Martinez MD   3/2/2021  07:06 EST

## 2021-03-02 NOTE — PROGRESS NOTES
"                  Clinical Nutrition     Nutrition Assessment  Reason for Visit:   Follow-up protocol    Patient Name: Raquel Carranza  YOB: 1955  MRN: [id number]  Date of Encounter: 03/02/21 15:10 EST  Admission date: 2/19/2021    Premier Protein added twice daily to meal trays for increased protein needs to promote proper wound healing    Nutrition Assessment   Assessment       Admission diagnosis  Sepsis due to cellulitis (CMS/HCC)    Additional applicable diagnosis/conditions/procedures this adm:  Sepsis/L groin cellulitis/abscess  Acute hypoxic respiratory failure  Probable CKD  Altered mental status  Word finding difficulty  (2/26) s/p I&D L thigh abscess  Funguria  WOC (2/27) - 1.5 x 5 x 9 cm subcutaneous tissue exposed  (2/27) s/p wound exploration/control of bleeding    Applicable PMH/PSxH:   Hypertension  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 rheumatoid factor (CMS/HCC)  Sjogren's disease (CMS/HCC)  Acquired hypothyroidism      PMH: She  has a past medical history of Abnormal glucose, Acquired hypothyroidism, Anxiety, Arrhythmia, Arthritis, Bilateral edema of lower extremity, Chronic joint pain, Hypertension, Iron deficiency anemia, Irritable bowel syndrome, 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 rheumatoid factor (CMS/HCC), Sjogren's disease (CMS/HCC), Thrombosis of right saphenous vein, and Vitamin D deficiency.   PSxH: She  has a past surgical history that includes Appendectomy; Hernia repair; Leg Excision Lesion/Cyst (Left, 2/26/2021); and Leg Excision Lesion/Cyst (Left, 2/27/2021).       Reported/Observed/Food/Nutrition Related History:      Patient tolerating cardiac regular diet well with good oral intake. Per available PO data, patient consuming ~71% of past meals. Noted previous surgeries for wound, WOC following. Increased protein needs to promote proper wound healing.      Anthropometrics     Height: 172.7 cm (68\")  Last filed wt: Weight: " "(!) 167 kg (368 lb 2.7 oz) (02/19/21 1833)  Weight Method: Bed scale    BMI: BMI (Calculated): 56  Obese Class III extreme obesity: > or equal to 40kg/m2    Ideal Body Weight (IBW) (kg): 64.15    Last 15 Recorded Weights  View Complete Flowsheet  Weight Weight (kg) Weight (lbs) Weight Method   2/19/2021 167 kg 368 lb 2.7 oz Bed scale   5/23/2017 164.202 kg 362 lb -   9/6/2016 154.223 kg 340 lb -         Weight Change   UBW:  Weight change:   % wt change:   Time frame of weight loss:     Labs reviewed     Results from last 7 days   Lab Units 03/02/21  0452 03/01/21  0550 02/28/21  0442   GLUCOSE mg/dL 98 94 90   BUN mg/dL 8 7* 10   CREATININE mg/dL 0.73 0.70 0.74   SODIUM mmol/L 141 139 139   CHLORIDE mmol/L 103 102 103   POTASSIUM mmol/L 4.2 4.0 4.4           Invalid input(s): PLAT      Results from last 7 days   Lab Units 02/23/21  1851   GLUCOSE mg/dL 158*     Lab Results   Lab Value Date/Time    HGBA1C 5.70 (H) 02/20/2021 0402       Medications reviewed   Pertinent:  ABX, FeSo4, MVI, D3, diflucan, gabapentin      Intake/Ouptut 24 hrs (7:00AM - 6:59 AM)     Intake & Output (last day)       03/01 0701 - 03/02 0700 03/02 0701 - 03/03 0700    P.O. 465 450    I.V. (mL/kg)      IV Piggyback      Total Intake(mL/kg) 465 (2.8) 450 (2.7)    Urine (mL/kg/hr) 1500 (0.4) 500 (0.4)    Total Output 1500 500    Net -1035 -50          Urine Unmeasured Occurrence 1 x         Needs Assessment (3/2)     Height used  172.7 cm (68\")   Weight used  167 kg (368 lb 2.7 oz) - bed scale 2/19/21   IBW  63.4 kg (140 lbs)     Estimated need Method/Equation used Result    Energy/Calorie need  ~2000 - 30779 kcals/d  14 kcal/kg act   30 kcal/kg IBW  2338 kcal   1902 kcal             Protein   ~167 g/day  2.0 g/kg IBW   1.0 g/kg actBW  127 g   167 g                 Current Nutrition Prescription     PO: Diet Regular; Cardiac, Consistent Carbohydrate  Intake: 71% x 7 meals        Nutrition Diagnosis     3/2  Problem Increased nutrient needs "   Etiology Wound healing/skin integrity   Signs/Symptoms S/p L thigh I&D and wound exploration       2/26 updated 3/2  Problem Inadequate oral intake   Etiology ?mental status, clinical status   Signs/Symptoms 44% x 7 meals    Status: resolving per current trend in PO intake    Nutrition Intervention     1.  Follow treatment progress, Care plan reviewed  2. Supplement provided - Premier Protein x2/day  3. Needs assessment completed to monitor protein intake      Goal:   General: Nutrition support treatment  PO: Increase intake, Meet estimated needs     Additional goals:  Meet 100% RDI    Monitoring/Evaluation:   Per protocol, PO intake, Supplement intake, Pertinent labs, Skin status    Tiny Cruz RDN, LD  Time Spent: 30 minutes

## 2021-03-02 NOTE — PLAN OF CARE
Goal Outcome Evaluation:  Plan of Care Reviewed With: patient  Progress: improving  Outcome Summary: Pt demonstrated good progress today, CGA+1 functional mob 90ft at bariatric FWW, CGA STS x2 reps, Len to return to supine from sitting EOB with improvement with log rolling technique, completed toilet clothing management Len, max A toilet hygiene with ed provided on AE, Len UBD overall, cont IPOT per POC

## 2021-03-03 LAB
ANION GAP SERPL CALCULATED.3IONS-SCNC: 5 MMOL/L (ref 5–15)
BUN SERPL-MCNC: 10 MG/DL (ref 8–23)
BUN/CREAT SERPL: 14.1 (ref 7–25)
CALCIUM SPEC-SCNC: 8.8 MG/DL (ref 8.6–10.5)
CHLORIDE SERPL-SCNC: 102 MMOL/L (ref 98–107)
CO2 SERPL-SCNC: 32 MMOL/L (ref 22–29)
CREAT SERPL-MCNC: 0.71 MG/DL (ref 0.57–1)
DEPRECATED RDW RBC AUTO: 52.7 FL (ref 37–54)
ERYTHROCYTE [DISTWIDTH] IN BLOOD BY AUTOMATED COUNT: 13.2 % (ref 12.3–15.4)
GFR SERPL CREATININE-BSD FRML MDRD: 82 ML/MIN/1.73
GLUCOSE SERPL-MCNC: 94 MG/DL (ref 65–99)
HCT VFR BLD AUTO: 41.2 % (ref 34–46.6)
HGB BLD-MCNC: 12.5 G/DL (ref 12–15.9)
MCH RBC QN AUTO: 32.6 PG (ref 26.6–33)
MCHC RBC AUTO-ENTMCNC: 30.3 G/DL (ref 31.5–35.7)
MCV RBC AUTO: 107.6 FL (ref 79–97)
PLATELET # BLD AUTO: 256 10*3/MM3 (ref 140–450)
PMV BLD AUTO: 9.5 FL (ref 6–12)
POTASSIUM SERPL-SCNC: 4.1 MMOL/L (ref 3.5–5.2)
RBC # BLD AUTO: 3.83 10*6/MM3 (ref 3.77–5.28)
SODIUM SERPL-SCNC: 139 MMOL/L (ref 136–145)
WBC # BLD AUTO: 6.89 10*3/MM3 (ref 3.4–10.8)

## 2021-03-03 PROCEDURE — 25010000002 CEFTRIAXONE PER 250 MG: Performed by: INTERNAL MEDICINE

## 2021-03-03 PROCEDURE — 99232 SBSQ HOSP IP/OBS MODERATE 35: CPT | Performed by: NURSE PRACTITIONER

## 2021-03-03 PROCEDURE — 97530 THERAPEUTIC ACTIVITIES: CPT

## 2021-03-03 PROCEDURE — 94799 UNLISTED PULMONARY SVC/PX: CPT

## 2021-03-03 PROCEDURE — 85027 COMPLETE CBC AUTOMATED: CPT | Performed by: INTERNAL MEDICINE

## 2021-03-03 PROCEDURE — 94660 CPAP INITIATION&MGMT: CPT

## 2021-03-03 PROCEDURE — 25010000002 DAPTOMYCIN PER 1 MG: Performed by: INTERNAL MEDICINE

## 2021-03-03 PROCEDURE — 97535 SELF CARE MNGMENT TRAINING: CPT

## 2021-03-03 PROCEDURE — 80048 BASIC METABOLIC PNL TOTAL CA: CPT | Performed by: INTERNAL MEDICINE

## 2021-03-03 PROCEDURE — 25010000002 MORPHINE PER 10 MG: Performed by: SURGERY

## 2021-03-03 RX ORDER — CEFDINIR 300 MG/1
300 CAPSULE ORAL EVERY 12 HOURS SCHEDULED
Status: DISCONTINUED | OUTPATIENT
Start: 2021-03-04 | End: 2021-03-05 | Stop reason: HOSPADM

## 2021-03-03 RX ORDER — BISACODYL 10 MG
10 SUPPOSITORY, RECTAL RECTAL DAILY
Status: DISCONTINUED | OUTPATIENT
Start: 2021-03-03 | End: 2021-03-05 | Stop reason: HOSPADM

## 2021-03-03 RX ORDER — MAGNESIUM CARB/ALUMINUM HYDROX 105-160MG
150 TABLET,CHEWABLE ORAL ONCE
Status: COMPLETED | OUTPATIENT
Start: 2021-03-03 | End: 2021-03-03

## 2021-03-03 RX ORDER — CEFDINIR 300 MG/1
300 CAPSULE ORAL EVERY 12 HOURS SCHEDULED
Status: DISCONTINUED | OUTPATIENT
Start: 2021-03-03 | End: 2021-03-03

## 2021-03-03 RX ORDER — METRONIDAZOLE 500 MG/1
500 TABLET ORAL EVERY 8 HOURS SCHEDULED
Status: DISCONTINUED | OUTPATIENT
Start: 2021-03-03 | End: 2021-03-05 | Stop reason: HOSPADM

## 2021-03-03 RX ADMIN — FAMOTIDINE 20 MG: 20 TABLET, FILM COATED ORAL at 17:45

## 2021-03-03 RX ADMIN — FLUCONAZOLE 200 MG: 200 TABLET ORAL at 08:16

## 2021-03-03 RX ADMIN — SODIUM HYPOCHLORITE: 1.25 SOLUTION TOPICAL at 21:25

## 2021-03-03 RX ADMIN — MORPHINE SULFATE 4 MG: 4 INJECTION, SOLUTION INTRAMUSCULAR; INTRAVENOUS at 21:24

## 2021-03-03 RX ADMIN — RIVAROXABAN 20 MG: 20 TABLET, FILM COATED ORAL at 17:45

## 2021-03-03 RX ADMIN — CEFTRIAXONE SODIUM 2 G: 2 INJECTION, POWDER, FOR SOLUTION INTRAMUSCULAR; INTRAVENOUS at 16:05

## 2021-03-03 RX ADMIN — SODIUM CHLORIDE, PRESERVATIVE FREE 10 ML: 5 INJECTION INTRAVENOUS at 21:25

## 2021-03-03 RX ADMIN — FAMOTIDINE 20 MG: 20 TABLET, FILM COATED ORAL at 06:33

## 2021-03-03 RX ADMIN — BACLOFEN 10 MG: 10 TABLET ORAL at 21:24

## 2021-03-03 RX ADMIN — FERROUS SULFATE TAB 325 MG (65 MG ELEMENTAL FE) 325 MG: 325 (65 FE) TAB at 08:14

## 2021-03-03 RX ADMIN — METRONIDAZOLE 500 MG: 500 INJECTION, SOLUTION INTRAVENOUS at 01:50

## 2021-03-03 RX ADMIN — DAPTOMYCIN 650 MG: 500 INJECTION, POWDER, LYOPHILIZED, FOR SOLUTION INTRAVENOUS at 15:03

## 2021-03-03 RX ADMIN — METRONIDAZOLE 500 MG: 500 INJECTION, SOLUTION INTRAVENOUS at 08:16

## 2021-03-03 RX ADMIN — SODIUM HYPOCHLORITE: 1.25 SOLUTION TOPICAL at 08:17

## 2021-03-03 RX ADMIN — LEVOTHYROXINE SODIUM 137 MCG: 0.14 TABLET ORAL at 06:33

## 2021-03-03 RX ADMIN — OXYCODONE HYDROCHLORIDE AND ACETAMINOPHEN 1 TABLET: 5; 325 TABLET ORAL at 09:00

## 2021-03-03 RX ADMIN — ALLOPURINOL 300 MG: 300 TABLET ORAL at 08:15

## 2021-03-03 RX ADMIN — GABAPENTIN 300 MG: 300 CAPSULE ORAL at 08:16

## 2021-03-03 RX ADMIN — GABAPENTIN 300 MG: 300 CAPSULE ORAL at 21:24

## 2021-03-03 RX ADMIN — POLYETHYLENE GLYCOL 3350 17 G: 17 POWDER, FOR SOLUTION ORAL at 08:14

## 2021-03-03 RX ADMIN — ASPIRIN 81 MG: 81 TABLET, CHEWABLE ORAL at 08:15

## 2021-03-03 RX ADMIN — DULOXETINE HYDROCHLORIDE 30 MG: 30 CAPSULE, DELAYED RELEASE ORAL at 08:14

## 2021-03-03 RX ADMIN — SODIUM CHLORIDE, PRESERVATIVE FREE 10 ML: 5 INJECTION INTRAVENOUS at 08:16

## 2021-03-03 RX ADMIN — BACLOFEN 10 MG: 10 TABLET ORAL at 08:14

## 2021-03-03 RX ADMIN — Medication 150 ML: at 16:11

## 2021-03-03 RX ADMIN — DOCUSATE SODIUM 100 MG: 100 CAPSULE, LIQUID FILLED ORAL at 08:15

## 2021-03-03 RX ADMIN — MAGNESIUM HYDROXIDE 15 ML: 2400 SUSPENSION ORAL at 11:40

## 2021-03-03 RX ADMIN — BACLOFEN 10 MG: 10 TABLET ORAL at 16:05

## 2021-03-03 RX ADMIN — BISACODYL 10 MG: 10 SUPPOSITORY RECTAL at 15:04

## 2021-03-03 RX ADMIN — METRONIDAZOLE 500 MG: 500 TABLET ORAL at 21:24

## 2021-03-03 RX ADMIN — METRONIDAZOLE 500 MG: 500 INJECTION, SOLUTION INTRAVENOUS at 15:03

## 2021-03-03 RX ADMIN — MULTIVITAMIN TABLET 1 TABLET: TABLET at 08:14

## 2021-03-03 NOTE — THERAPY TREATMENT NOTE
Patient Name: Raquel Carranza  : 1955    MRN: [id number]                              Today's Date: 3/3/2021       Admit Date: 2021    Visit Dx:     ICD-10-CM ICD-9-CM   1. Sepsis, due to unspecified organism, unspecified whether acute organ dysfunction present (CMS/HCC)  A41.9 038.9     995.91   2. Left leg cellulitis  L03.116 682.6   3. Abscess of left thigh  L02.416 682.6     Patient Active Problem List   Diagnosis   • History of deep vein thrombosis of lower extremity   • Special screening for malignant neoplasms, colon   • Sepsis due to cellulitis (CMS/HCC)   • RAFA (acute kidney injury) (CMS/HCC)   • Arrhythmia   • Hypertension   • 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 rheumatoid factor (CMS/HCC)   • Sjogren's disease (CMS/HCC)   • Acquired hypothyroidism   • CKD (chronic kidney disease)   • Altered mental status   • Hypoxia   • Word finding difficulty     Past Medical History:   Diagnosis Date   • Abnormal glucose    • Acquired hypothyroidism    • Anxiety    • Arrhythmia    • Arthritis    • Bilateral edema of lower extremity    • Chronic joint pain    • Hypertension    • Iron deficiency anemia    • Irritable bowel syndrome    • 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 rheumatoid factor (CMS/HCC)    • Sjogren's disease (CMS/HCC)    • Thrombosis of right saphenous vein    • Vitamin D deficiency      Past Surgical History:   Procedure Laterality Date   • APPENDECTOMY     • HERNIA REPAIR     • LEG EXCISION LESION/CYST Left 2021    Procedure: INCISION AND DRAINAGE LEFT THIGH ABCESS;  Surgeon: Noe Ken MD;  Location:  [facility];  Service: General;  Laterality: Left;   • LEG EXCISION LESION/CYST Left 2021    Procedure: WOUND EXPLORATION LEFT THIGH FOR CONTROL OF BLEEDING;  Surgeon: Sreedhar Bobby MD;  Location:  [facility];  Service: General;  Laterality: Left;     General Information     Row Name 21 1142          OT Time and  Intention    Document Type  therapy note (daily note)  -KF     Mode of Treatment  occupational therapy  -KF     Row Name 03/03/21 1142          General Information    Patient Profile Reviewed  yes  -KF     Existing Precautions/Restrictions  fall;oxygen therapy device and L/min;other (see comments) 5L today, L thigh incision, bariatric walker preferred  -KF     Barriers to Rehab  medically complex  -KF     Row Name 03/03/21 1142          Cognition    Orientation Status (Cognition)  oriented x 4  -KF     Row Name 03/03/21 1142          Safety Issues, Functional Mobility    Safety Issues Affecting Function (Mobility)  awareness of need for assistance;safety precaution awareness  -KF     Impairments Affecting Function (Mobility)  endurance/activity tolerance;pain;range of motion (ROM);shortness of breath;strength  -KF       User Key  (r) = Recorded By, (t) = Taken By, (c) = Cosigned By    Initials Name Provider Type    KF Faustina Pérez OT Occupational Therapist          Mobility/ADL's     Row Name 03/03/21 1144          Bed Mobility    Bed Mobility  scooting/bridging;supine-sit  -KF     Scooting/Bridging [place] (Bed Mobility)  modified independence  -KF     Supine-Sit [place] (Bed Mobility)  modified independence  -KF     Bed Mobility, Safety Issues  decreased use of arms for pushing/pulling;decreased use of legs for bridging/pushing  -KF     Assistive Device (Bed Mobility)  bed rails;head of bed elevated  -KF     Comment (Bed Mobility)  mod I for all bed mobility today  -KF     Row Name 03/03/21 1144          Transfers    Transfers  sit-stand transfer;bed-chair transfer  -KF     Comment (Transfers)  progressed to SBA today at bariatric walker good safety  -KF     Bed-Chair [place] (Transfers)  standby assist;verbal cues  -KF     Assistive Device (Bed-Chair Transfers)  walker, front-wheeled;bariatric  -KF     Sit-Stand [place] (Transfers)  standby assist;verbal cues  -KF     Row Name 03/03/21  1144          Sit-Stand Transfer    Assistive Device (Sit-Stand Transfers)  walker, front-wheeled;bariatric  -     Row Name 03/03/21 1144          Functional Mobility    Functional Mobility- Comment  deferred to PT  -[facility] Name 03/03/21 1144          Activities of Daily Living    BADL Assessment/Intervention  lower body dressing;toileting;bathing  -[facility] Name 03/03/21 1144          Upper Body Dressing Assessment/Training    [place] Level (Upper Body Dressing)  don;front opening garment;set up  -     Position (Upper Body Dressing)  edge of bed sitting  -[facility] Name 03/03/21 1144          Lower Body Dressing Assessment/Training    Comment (Lower Body Dressing)  ed on AE Pt states good understanding of sockaid at home, reacher at home for all LBD education provided on compensatory strategies dressing LLE first  -[facility] Name 03/03/21 1144          Bathing Assessment/Intervention    Comment (Bathing)  states has LH bathsponge at home to use, handheld shower and tub bench for home ed provided on safety with bathing  -[facility] Name 03/03/21 1144          Toileting Assessment/Training    Comment (Toileting)  declined need to void, Pt reports use of bidet at home for hygiene and prefers this method of hygiene  -       User Key  (r) = Recorded By, (t) = Taken By, (c) = Cosigned By    Initials Name Provider Type    KF Faustina Pérez, OT Occupational Therapist        Obj/Interventions     [facility] Name 03/03/21 1149          Shoulder (Therapeutic Exercise)    Shoulder (Therapeutic Exercise)  AROM (active range of motion)  -     Shoulder AROM (Therapeutic Exercise)  bilateral;flexion;extension;horizontal aBduction/aDduction;10 repetitions with PLB incorporated ed provided on frequency  -     Row Name 03/03/21 1149          Balance    Balance Assessment  sitting static balance;sitting dynamic balance;standing static balance;standing dynamic balance  -     Static Sitting Balance   WNL;unsupported;sitting, edge of bed  -KF     Dynamic Sitting Balance  WNL;unsupported;sitting, edge of bed  -KF     Static Standing Balance  WFL;supported;standing  -KF     Dynamic Standing Balance  mild impairment;supported;standing  -KF     Balance Interventions  sitting;standing;occupation based/functional task;UE activity with balance activity  -KF     Comment, Balance  no LOB  -KF     Row Name 03/03/21 1149          Therapeutic Exercise    Therapeutic Exercise  shoulder  -KF       User Key  (r) = Recorded By, (t) = Taken By, (c) = Cosigned By    Initials Name Provider Type    KF Faustina Pérez, OT Occupational Therapist        Goals/Plan     Row Name 03/03/21 1154          Bed Mobility Goal 1 (OT)    Activity/Assistive Device (Bed Mobility Goal 1, OT)  sit to supine/supine to sit  -KF     [place] Level/Cues Needed (Bed Mobility Goal 1, OT)  contact guard assist;verbal cues required  -KF     Time Frame (Bed Mobility Goal 1, OT)  long term goal (LTG);10 days  -KF     Progress/Outcomes (Bed Mobility Goal 1, OT)  goal partially met met supine to sitting  -KF     Row Name 03/03/21 1154          Transfer Goal 1 (OT)    Activity/Assistive Device (Transfer Goal 1, OT)  bed-to-chair/chair-to-bed;commode, bedside without drop arms;walker, rolling  -KF     [place] Level/Cues Needed (Transfer Goal 1, OT)  standby assist;verbal cues required  -KF     Time Frame (Transfer Goal 1, OT)  long term goal (LTG);10 days  -KF     Progress/Outcome (Transfer Goal 1, OT)  goal partially met met for bed to chair  -KF     Row Name 03/03/21 1154          Dressing Goal 1 (OT)    Activity/Device (Dressing Goal 1, OT)  lower body dressing  -KF     [place]/Cues Needed (Dressing Goal 1, OT)  minimum assist (75% or more patient effort)  -KF     Time Frame (Dressing Goal 1, OT)  long term goal (LTG);10 days  -KF     Progress/Outcome (Dressing Goal 1, OT)  goal no longer appropriate states has AE at home and feels like she can  accomplish all LBD at home  -KF     Row Name 03/03/21 1154          Toileting Goal 1 (OT)    Activity/Device (Toileting Goal 1, OT)  adjust/manage clothing  -KF     [place] Level/Cues Needed (Toileting Goal 1, OT)  contact guard assist;verbal cues required  -KF     Time Frame (Toileting Goal 1, OT)  long term goal (LTG);10 days  -KF     Progress/Outcome (Toileting Goal 1, OT)  goal ongoing;goal revised this date  -KF     Row Name 03/03/21 1154          Grooming Goal 1 (OT)    Activity/Device (Grooming Goal 1, OT)  hair care;wash face, hands;oral care standing sink side  -KF     [place] (Grooming Goal 1, OT)  standby assist  -KF     Time Frame (Grooming Goal 1, OT)  long term goal (LTG);10 days  -KF     Progress/Outcome (Grooming Goal 1, OT)  goal ongoing;good progress toward goal  -KF       User Key  (r) = Recorded By, (t) = Taken By, (c) = Cosigned By    Initials Name Provider Type    KF Faustina Pérez, OT Occupational Therapist        Clinical Impression     Row Name 03/03/21 1150          Pain Assessment    Additional Documentation  Pain Scale: Numbers Pre/Post-Treatment (Group)  -     Row Name 03/03/21 1150          Pain Scale: Numbers Pre/Post-Treatment    Pretreatment Pain Rating  0/10 - no pain  -KF     Posttreatment Pain Rating  0/10 - no pain  -KF     Pre/Posttreatment Pain Comment  tolerated  -KF     Pain Intervention(s)  Ambulation/increased activity;Repositioned  -KF     Row Name 03/03/21 1150          Plan of Care Review    Plan of Care Reviewed With  patient  -KF     Progress  improving  -KF     Outcome Summary  Pt completed bed mob [place], SBA STS at bariatric FWW and CGA steps to chair with FWW, has AE for LBD/.bathing at home, ed provided on compensatory dressing strategies, completed BUE ther ex seated UIC with good tolerance, 87%-90% throughout 5L NC, cont IPOT per POC  -     Row Name 03/03/21 1150          Therapy Assessment/Plan (OT)    Rehab Potential (OT)  good, to achieve  stated therapy goals  -KF     Criteria for Skilled Therapeutic Interventions Met (OT)  yes;meets criteria;skilled treatment is necessary  -KF     Therapy Frequency (OT)  daily  -KF     Row Name 03/03/21 1150          Therapy Plan Review/Discharge Plan (OT)    Anticipated Discharge Disposition (OT)  inpatient rehabilitation facility  -KF     Row Name 03/03/21 1150          Vital Signs    Pre Systolic BP Rehab  105 RN cleared VSS  -KF     Pre Treatment Diastolic BP  58  -KF     Post Systolic BP Rehab  109  -KF     Post Treatment Diastolic BP  64  -KF     Pre SpO2 (%)  92  -KF     O2 Delivery Pre Treatment  supplemental O2  -KF     Intra SpO2 (%)  87  -KF     O2 Delivery Intra Treatment  supplemental O2  -KF     Post SpO2 (%)  90  -KF     O2 Delivery Post Treatment  supplemental O2  -KF     Pre Patient Position  Supine  -KF     Intra Patient Position  Standing  -KF     Post Patient Position  Sitting  -KF     Rest Breaks   1  -KF     Row Name 03/03/21 1150          Positioning and Restraints    Pre-Treatment Position  in bed  -KF     Post Treatment Position  chair  -KF     In Chair  notified nsg;sitting;call light within reach;encouraged to call for assist;exit alarm on;waffle cushion  -KF       User Key  (r) = Recorded By, (t) = Taken By, (c) = Cosigned By    Initials Name Provider Type    KF Faustina Pérez, OT Occupational Therapist        Outcome Measures     Row Name 03/03/21 1156          How much help from another is currently needed...    Putting on and taking off regular lower body clothing?  3 with AE  -KF     Bathing (including washing, rinsing, and drying)  3  -KF     Toileting (which includes using toilet bed pan or urinal)  3  -KF     Putting on and taking off regular upper body clothing  3  -KF     Taking care of personal grooming (such as brushing teeth)  3  -KF     Eating meals  4  -KF     AM-PAC 6 Clicks Score (OT)  19  -KF     Row Name 03/03/21 1156          Functional Assessment    Outcome Measure  Options  AM-PAC 6 Clicks Daily Activity (OT)  -KF       User Key  (r) = Recorded By, (t) = Taken By, (c) = Cosigned By    Initials Name Provider Type    KF Faustina Pérez OT Occupational Therapist        Occupational Therapy Education                 Title: PT OT SLP Therapies (In Progress)     Topic: Occupational Therapy (Done)     Point: ADL training (Done)     Description:   Instruct learner(s) on proper safety adaptation and remediation techniques during self care or transfers.   Instruct in proper use of assistive devices.              Learning Progress Summary           Patient Acceptance, E,TB,D, VU,DU by  at 3/3/2021 1156    Acceptance, E,TB,D, VU,DU by  at 3/2/2021 1320    Acceptance, E,TB,D, VU,DU by  at 2/26/2021 0942    Acceptance, E, VU,NR by  at 2/23/2021 0949    Acceptance, E,TB,D, VU,DU by  at 2/20/2021 1514                   Point: Home exercise program (Done)     Description:   Instruct learner(s) on appropriate technique for monitoring, assisting and/or progressing therapeutic exercises/activities.              Learning Progress Summary           Patient Acceptance, E,TB,D, VU,DU by  at 3/3/2021 1156    Acceptance, E,TB,D, VU,DU by  at 3/2/2021 1320    Acceptance, E,TB,D, VU,DU by  at 2/26/2021 0942    Acceptance, E,TB,D, VU,DU by  at 2/20/2021 1514                   Point: Precautions (Done)     Description:   Instruct learner(s) on prescribed precautions during self-care and functional transfers.              Learning Progress Summary           Patient Acceptance, E,TB,D, VU,DU by  at 3/3/2021 1156    Acceptance, E,TB,D, VU,DU by  at 3/2/2021 1320    Acceptance, E,TB,D, VU,DU by  at 2/26/2021 0942    Acceptance, E, VU,NR by  at 2/23/2021 0949    Acceptance, E,TB,D, VU,DU by  at 2/20/2021 1514                   Point: Body mechanics (Done)     Description:   Instruct learner(s) on proper positioning and spine alignment during self-care, functional mobility  activities and/or exercises.              Learning Progress Summary           Patient Acceptance, E,TB,D, VU,DU by  at 3/3/2021 1156    Acceptance, E,TB,D, VU,DU by  at 3/2/2021 1320    Acceptance, E,TB,D, VU,DU by  at 2/26/2021 0942    Acceptance, E, VU,NR by  at 2/23/2021 0949    Acceptance, E,TB,D, VU,DU by  at 2/20/2021 1514                               User Key     Initials Effective Dates Name Provider Type Discipline     04/03/18 -  Faustina Pérez OT Occupational Therapist OT    [facility] 07/18/19 -  Tiny Casillas OT Occupational Therapist OT              OT Recommendation and Plan  Planned Therapy Interventions (OT): activity tolerance training, functional balance retraining, occupation/activity based interventions, ROM/therapeutic exercise, strengthening exercise, transfer/mobility retraining, BADL retraining, patient/caregiver education/training  Therapy Frequency (OT): daily  Plan of Care Review  Plan of Care Reviewed With: patient  Progress: improving  Outcome Summary: Pt completed bed mob [place], SBA STS at bariatric FWW and CGA steps to chair with FWW, has AE for LBD/.bathing at home, ed provided on compensatory dressing strategies, completed BUE ther ex seated UIC with good tolerance, 87%-90% throughout 5L NC, cont IPOT per POC     Time Calculation:   Time Calculation- OT     Row Name 03/03/21 1109             Time Calculation- OT    OT Start Time  1109  -KF      OT Received On  03/03/21  -         Timed Charges    51219 - OT Therapeutic Activity Minutes  5  -KF      46494 - OT Self Care/Mgmt Minutes  10  -KF        User Key  (r) = Recorded By, (t) = Taken By, (c) = Cosigned By    Initials Name Provider Type     Faustina Pérez OT Occupational Therapist        Therapy Charges for Today     Code Description Service Date Service Provider Modifiers Qty    18083245860 HC OT THERAPEUTIC ACT EA 15 MIN 3/2/2021 Faustina Pérez OT GO 2    67053219930 HC OT SELF CARE/MGMT/TRAIN EA 15 MIN  3/2/2021 Faustina Pérez, OT GO 1    40111445660 HC OT THER SUPP EA 15 MIN 3/2/2021 Faustina Pérez OT GO 2    38605830358 HC OT SELF CARE/MGMT/TRAIN EA 15 MIN 3/3/2021 Faustina Pérez OT GO 1               Faustina Pérez OT  3/3/2021

## 2021-03-03 NOTE — PLAN OF CARE
Goal Outcome Evaluation:  Plan of Care Reviewed With: patient  Progress: improving  Outcome Summary: Pt ambulates 90 ft in ross with RWx, CGA, on 5-6L per NC throughout. Upon return to room pt sat on BSC. RN notified.

## 2021-03-03 NOTE — PLAN OF CARE
Goal Outcome Evaluation:  Plan of Care Reviewed With: patient  Progress: improving  Outcome Summary: Pt completed bed mob [place], SBA STS at bariatric FWW and CGA steps to chair with FWW, has AE for LBD/.bathing at home, ed provided on compensatory dressing strategies, completed BUE ther ex seated UIC with good tolerance, 87%-90% throughout 5L NC, cont IPOT per POC

## 2021-03-03 NOTE — PROGRESS NOTES
[facility] Medicine Services  PROGRESS NOTE    Patient Name: Raquel Carranza  : 1955  MRN: [id number]    Date of Admission: 2021  Primary Care Physician: Sommer Paniagua MD    Subjective   Subjective     CC:  L groin pain    HPI:  Patient resting in chair, alert and oriented x3, pleasant, talkative.  Patient saturation 93% on 5 L.  Denies any issues overnight.  She has not had a bowel movement in 1 week.  Escalate bowel regiment.  Dr Ken reports is okay to resume Xarelto    ROS:  Gen- No fevers, chills  CV- No chest pain, palpitations  Resp- No cough, dyspnea  GI- No N/V/D, abd pain  All 14 systems reviewed and are neg, except those mentioned in HPI.    Objective   Objective     Vital Signs:   Temp:  [97.9 °F (36.6 °C)-98.1 °F (36.7 °C)] 97.9 °F (36.6 °C)  Heart Rate:  [51-65] 65  Resp:  [18-20] 20  BP: ()/(41-65) 105/58  Total (NIH Stroke Scale): 0     Physical Exam:  Constitutional: Awake, alert, no acute distress, morbidly obese  Eyes: PERRLA, sclerae anicteric, no conjunctival injection  HENT: NCAT, mucous membranes moist  Neck: Supple, no thyromegaly, no lymphadenopathy, trachea midline  Respiratory: Clear to auscultation bilaterally, nonlabored respirations   Cardiovascular: RRR, no murmurs, rubs, or gallops, palpable pedal pulses bilaterally  Gastrointestinal: Positive bowel sounds, soft, nontender, nondistended  Musculoskeletal: No bilateral ankle edema, no clubbing or cyanosis to extremities  Psychiatric: Appropriate affect, cooperative  Neurologic: Oriented x 3, strength symmetric in all extremities, Cranial Nerves grossly intact to confrontation, speech clear  Skin: No rashes. Left  Groin wound site with clean dressing, no drainage.    Results Reviewed:  Results from last 7 days   Lab Units 21  0502 21  0452 21  0550  21  0438   WBC 10*3/mm3 6.89 8.70 9.93   < > 9.81   HEMOGLOBIN g/dL 12.5 11.3* 11.2*   < > 15.1   HEMATOCRIT % 41.2  37.2 36.8   < > 49.4*   PLATELETS 10*3/mm3 256 230 224   < > 241   INR   --   --   --   --  1.26*    < > = values in this interval not displayed.     Results from last 7 days   Lab Units 03/03/21  0502 03/02/21  0452 03/01/21  0550   SODIUM mmol/L 139 141 139   POTASSIUM mmol/L 4.1 4.2 4.0   CHLORIDE mmol/L 102 103 102   CO2 mmol/L 32.0* 32.0* 31.0*   BUN mg/dL 10 8 7*   CREATININE mg/dL 0.71 0.73 0.70   GLUCOSE mg/dL 94 98 94   CALCIUM mg/dL 8.8 8.7 8.6     Estimated Creatinine Clearance: 113.1 mL/min (by C-G formula based on SCr of 0.71 mg/dL).    Microbiology Results Abnormal     Procedure Component Value - Date/Time    Tissue / Bone Culture - Tissue, Thigh, Left [[id number]] Collected: 02/27/21 1402    Lab Status: Final result Specimen: Tissue from Thigh, Left Updated: 03/02/21 0742     Tissue Culture No growth at 3 days     Gram Stain Many (4+) WBCs seen      No organisms seen    Anaerobic Culture - Tissue, Thigh, Left [[id number]] Collected: 02/27/21 1402    Lab Status: Preliminary result Specimen: Tissue from Thigh, Left Updated: 03/02/21 0712     Anaerobic Culture No anaerobes isolated at 3 days    Anaerobic Culture - Tissue, Thigh, Left [[id number]]  (Abnormal) Collected: 02/26/21 1524    Lab Status: Final result Specimen: Tissue from Thigh, Left Updated: 03/01/21 1246     Anaerobic Culture Prevotella loescheii    Wound Culture - Swab, Thigh, Left [[id number]] Collected: 02/26/21 1524    Lab Status: Final result Specimen: Swab from Thigh, Left Updated: 03/01/21 0620     Wound Culture No growth at 3 days     Gram Stain No organisms seen      Many (4+) WBCs per low power field      Rare (1+) Epithelial cells per low power field    AFB Culture - Tissue, Thigh, Left [[id number]] Collected: 02/27/21 1402    Lab Status: Preliminary result Specimen: Tissue from Thigh, Left Updated: 02/28/21 1034     AFB Stain No acid fast bacilli seen on concentrated smear    Urine Culture - Urine, Urine, Clean Catch [[id number]]   (Abnormal) Collected: 02/25/21 1611    Lab Status: Final result Specimen: Urine, Clean Catch Updated: 02/26/21 1300     Urine Culture Yeast isolated     Comment: No further workup.       Blood Culture - Blood, Arm, Left [[id number]] Collected: 02/19/21 1900    Lab Status: Final result Specimen: Blood from Arm, Left Updated: 02/24/21 1930     Blood Culture No growth at 5 days    Blood Culture - Blood, Arm, Right [[id number]] Collected: 02/19/21 1850    Lab Status: Final result Specimen: Blood from Arm, Right Updated: 02/24/21 1930     Blood Culture No growth at 5 days    COVID-19 and FLU A/B PCR - Swab, Nasopharynx [[id number]]  (Normal) Collected: 02/19/21 1911    Lab Status: Final result Specimen: Swab from Nasopharynx Updated: 02/19/21 2143     COVID19 Not Detected     Influenza A PCR Not Detected     Influenza B PCR Not Detected    Narrative:      Fact sheet for providers: https://www.fda.gov/media/600548/download    Fact sheet for patients: https://www.fda.gov/media/879420/download    Test performed by PCR.        Imaging Results (Last 24 Hours)     ** No results found for the last 24 hours. **        Results for orders placed during the hospital encounter of 02/19/21   Adult Transthoracic Echo Complete W/ Cont if Necessary Per Protocol (With Agitated Saline)    Narrative · Left ventricular ejection fraction appears to be 61 - 65%. Left   ventricular systolic function is normal.  · Left ventricular diastolic function was normal.        I have reviewed the medications:  Scheduled Meds:Pharmacy Consult, , Does not apply, BID  allopurinol, 300 mg, Oral, Daily  aspirin, 81 mg, Oral, Daily  baclofen, 10 mg, Oral, TID  bisacodyl, 10 mg, Rectal, Daily  cefTRIAXone, 2 g, Intravenous, Q24H  DAPTOmycin, 6 mg/kg (Adjusted), Intravenous, Q24H  docusate sodium, 100 mg, Oral, BID  DULoxetine, 30 mg, Oral, Daily  famotidine, 20 mg, Oral, BID AC  ferrous sulfate, 325 mg, Oral, Daily With Breakfast  fluconazole, 200 mg, Oral,  Q24H  gabapentin, 300 mg, Oral, Q12H  levothyroxine, 137 mcg, Oral, Q AM  magnesium citrate, 150 mL, Oral, Once  metoprolol succinate XL, 25 mg, Oral, Daily  metroNIDAZOLE, 500 mg, Intravenous, Q6H  multivitamin, 1 tablet, Oral, Daily  polyethylene glycol, 17 g, Oral, Daily  rivaroxaban, 20 mg, Oral, Daily With Dinner  sodium chloride, 10 mL, Intravenous, Q12H  sodium hypochlorite, , Topical, Q12H  Vitamin D3, 50,000 Units, Oral, Q7 Days      Continuous Infusions:   PRN Meds:.acetaminophen  •  aluminum-magnesium hydroxide-simethicone  •  hydrOXYzine  •  magnesium hydroxide  •  magnesium sulfate **OR** magnesium sulfate **OR** magnesium sulfate  •  Morphine  •  oxyCODONE-acetaminophen  •  sodium chloride  •  Insert peripheral IV **AND** sodium chloride  •  sodium chloride    Assessment/Plan   Assessment & Plan     Active Hospital Problems    Diagnosis  POA   • **Sepsis due to cellulitis (CMS/[facility]) [L03.90, A41.9]  Yes   • RAFA (acute kidney injury) (CMS/[facility]) [N17.9]  Unknown   • Arrhythmia [I49.9]  Unknown   • Hypertension [I10]  Unknown   • 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 rheumatoid factor (CMS/[facility]) [M05.79]  Unknown   • Sjogren's disease (CMS/[facility]) [M35.00]  Unknown   • Acquired hypothyroidism [E03.9]  Unknown   • CKD (chronic kidney disease) [N18.9]  Unknown   • Altered mental status [R41.82]  Unknown   • Hypoxia [R09.02]  Unknown   • Word finding difficulty [R47.89]  Unknown   • History of deep vein thrombosis of lower extremity [Z86.718]  Not Applicable      Resolved Hospital Problems   No resolved problems to display.     Brief Hospital Course to date:  Raquel Carranza is a 65 y.o. female with h/o hypothyroidism, HTN, RA, Sjogren's disease, DVT on Xarelto, MAHESH/CPAP, CKD, and arrhythmia who presented with sepsis due to Left groin cellulitis.  Since admission, pt's L groin site has progressed to an abscess. She went to the OR with Dr. Ken on 2/26 for I&D.  Post op, pt had significant  bleeding from wound site, so was taken back to the OR 2/27 with Dr. Bobby s/p electrocautery or suture ligation of multiple bleeding sites as well as I&D of wound as more necrotic tissue was noted.     This patient's problems and plans were partially entered by my partner and updated as appropriate by me 03/03/21.    Plan:    Sepsis/L groin cellulitis/abscess  --IV abx per ID  --ID following, initially assessed by surgery with no intervention warranted   --ultrasound and clinical exam on 2/25 c/w abscess. S/p I&D by Dr. Ken 2/26 and repeat I&D as well as control of bleeding sites by Dr. Bobby 2/27  --wound cultures with Prevotella  --03/03 okay per Dr Ken to resume xarelto     Funguria  --from UCx obtained 2/25  --started on Fluconazole 2/26 by ID    Acute hypoxic respiratory failure  --thought secondary to volume overload s/p bumex  --ECHO with normal EF  --hypoxia likely due to OHS/MAHESH  --complicated by body habitus  --relatively stable overall  --doubt PE given chronic AC    Probable CKD  --stable    Sjogren's/RA  --not actively on any immunosuppression    Hx of DVT  --on Xarelto at home, initially on Heparin gtt here due to ? Need for surgery then transitioned back to Xarelto. Held Xarelto for possible intervention.  -- Xarelto resumed 03/03    AMS-resolved  --new visual disturbance/dizziness  --neurology reconsulted, appreciate their assistance. They have now signed off.   --possibly due to medications  --likely has significant anxiety component, added atarax    Left flank pain  --resolved, checked UA/UCx on 2/25 and findings as mentioned above    DVT Prophylaxis: resume Xarelto resumed 03/03    Disposition: I expect the patient to be discharged to [facility] once bed available    CODE STATUS:   Code Status and Medical Interventions:   Ordered at: 02/19/21 4983     Code Status:    CPR     Medical Interventions (Level of Support Prior to Arrest):    Full     Electronically signed by BRUCE Rush,  03/03/21, 2:25 PM EST.

## 2021-03-03 NOTE — PLAN OF CARE
Goal Outcome Evaluation:  Plan of Care Reviewed With: patient     Outcome Summary: Pt rested on and off throughout the night. VSS except SBP in 90s, pt asymptomatic. IV ABX continued. 5 L NC, Cpap at night. Pt c/o pain r/t dressing change, PRN pain med given with improvement. No other complaints from pt at this time.

## 2021-03-03 NOTE — PROGRESS NOTES
INFECTIOUS DISEASE Progress Note    Raquel Carranza  1955  [id number]      Admission Date: 2/19/2021      Requesting Provider: Brock Mata MD  Evaluating Physician: Som Martinez MD    Reason for Consultation: Sepsis    History of present illness:    2/20/21: Patient is a 65 y.o. female with h/o hypothyroidism, HTN, RA, Sjogren's disease, DVT/Xarelto, MAHESH/CPAP, CKD, and arrhythmia who we were asked to see for sepsis.  She presented to [facility] ED on 2/19/21 with fever and left groin pain for the past 3 days.  She noted a boil that started in her left groin about 3 days ago that increased in size and became more painful.  She became more confused and had difficulty with word-finding capability.  She developed a fever of 103 at home yesterday and her son brought her to ED for further evaluation.  Initial evaluation showed Tmax 103, , PCT 4.46, Creatinine 1.22 (baseline about 1.2), WBC 17,900 with 81% segs/6% bands, and UA WBC 21-30 with small LE and negative nitrite.  A COVID-19 PCR was negative. Blood cultures are pending.  CT scan of neck and head along with MRI ruled out acute stroke.  A CXR is unremarkable.  A CT scan of LLE and pelvis showed left upper thigh cellulitis extending to left obturator muscle with no evidence of abscess.  She was started on Merrem and Vancomycin.  ID was asked to evaluate and manage her antibiotic therapy.     2/21/21: Her maximum temperature over the last 24 hours is 100.3. Her blood cultures have remained negative.  She states that she is allergic to penicillin but has previously tolerated Keflex.  She has decreased left groin/medial thigh pain.    2/22/21: She feels better today.  She has decreased left medial thigh.  She has remained afebrile.  She denies nausea and vomiting.    2/23/21:  She has remained afebrile. White blood cell count this morning is 12.9. She has decreased left medial thigh pain and swelling.    2/24/21: She has remained afebrile. She has decreased  left medial thigh pain.  Ultrasound today revealed a developing abscess.    2/25/21: She has remained afebrile. She has decreased left medial thigh pain.  She denies cough and sputum production.    2/26/21: She is scheduled for operative debridement of her left thigh abscess. She has remained afebrile. Her urinalysis reveals 31-50 white blood cells and 2+ yeast.  She denies increased left thigh pain.    2/27/21: She underwent operative debridement of her left thigh abscess yesterday.  She has remained afebrile overnight. Her abscess cultures are no growth so far.  The material was not malodorous.    3/1/21: She was taken back to the operating room on 2/27 for continued wound bleeding. Additional necrotic tissue was sent for culture. Abscess cultures from 2/26 were negative but antibiotic modified. Her hemoglobin this morning is 11.2 and her white blood cell count is 9.9. She has remained afebrile.      3/2/21: She has remained afebrile. Her anaerobic abscess cultures have grown Prevotella. She has decreased left thigh pain she denies nausea and vomiting.    3/3/21: She has remained afebrile. She has decreased left groin pain.  She denies nausea and vomiting.          Past Medical History:   Diagnosis Date   • Abnormal glucose    • Acquired hypothyroidism    • Anxiety    • Arrhythmia    • Arthritis    • Bilateral edema of lower extremity    • Chronic joint pain    • Hypertension    • Iron deficiency anemia    • Irritable bowel syndrome    • 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 rheumatoid factor (CMS/HCC)    • Sjogren's disease (CMS/HCC)    • Thrombosis of right saphenous vein    • Vitamin D deficiency        Past Surgical History:   Procedure Laterality Date   • APPENDECTOMY     • HERNIA REPAIR     • LEG EXCISION LESION/CYST Left 2/26/2021    Procedure: INCISION AND DRAINAGE LEFT THIGH ABCESS;  Surgeon: Noe Ken MD;  [facility]: [facility];  Service: General;  Laterality: Left;   •  LEG EXCISION LESION/CYST Left 2/27/2021    Procedure: WOUND EXPLORATION LEFT THIGH FOR CONTROL OF BLEEDING;  Surgeon: Sreedhar Bobby MD;  Location: [facility];  Service: General;  Laterality: Left;       Family History   Problem Relation Age of Onset   • Cancer Mother         leukemia   • Hypertension Father    • Stroke Father    • Heart disease Father    • Cancer Father         colon   • Diabetes Brother    • Diabetes Paternal Aunt    • Diabetes Paternal Uncle        Social History     Socioeconomic History   • Marital status:      Spouse name: Not on file   • Number of children: Not on file   • Years of education: Not on file   • Highest education level: Not on file   Tobacco Use   • Smoking status: Current Every Day Smoker     Packs/day: 1.00     Types: Cigarettes   • Smokeless tobacco: Never Used   Substance and Sexual Activity   • Alcohol use: No   • Drug use: Never   • Sexual activity: Defer       Allergies   Allergen Reactions   • Ace Inhibitors    • Penicillins    • Adacel [Tetanus-Diphth-Acell Pertussis]      Preservative in the vaccine   • Lisinopril    • Zostavax [Zoster Vaccine Live]      Preservative in the vaccine   • Thimerosal Rash         Medication:    Current Facility-Administered Medications:   •  !Patient's home meds stored in pharmacy, , Does not apply, BID, Sreedhar Bobby MD  •  acetaminophen (TYLENOL) tablet 650 mg, 650 mg, Oral, Q6H PRN, Sreedhar Bobby MD, 650 mg at 02/28/21 1614  •  allopurinol (ZYLOPRIM) tablet 300 mg, 300 mg, Oral, Daily, Sreedhar Bobby MD, 300 mg at 03/03/21 0815  •  aluminum-magnesium hydroxide-simethicone (MAALOX MAX) 400-400-40 MG/5ML suspension 15 mL, 15 mL, Oral, Q6H PRN, Sreedhar Bobby MD, 15 mL at 02/19/21 2144  •  aspirin chewable tablet 81 mg, 81 mg, Oral, Daily, 81 mg at 03/03/21 0815 **OR** [DISCONTINUED] aspirin suppository 300 mg, 300 mg, Rectal, Daily, Noe Ken MD  •  baclofen (LIORESAL) tablet 10 mg, 10 mg, Oral,  TID, Sreedhar Bobby MD, 10 mg at 03/03/21 0814  •  cefTRIAXone (ROCEPHIN) 2 g/100 mL 0.9% NS IVPB (MBP), 2 g, Intravenous, Q24H, Som Martinez MD, 2 g at 03/02/21 1651  •  DAPTOmycin (CUBICIN) 650 mg in sodium chloride 0.9 % 50 mL IVPB, 6 mg/kg (Adjusted), Intravenous, Q24H, Nabeel Martinez MD, Last Rate: 100 mL/hr at 03/02/21 1421, 650 mg at 03/02/21 1421  •  docusate sodium (COLACE) capsule 100 mg, 100 mg, Oral, BID, Sreedhar Bobby MD, 100 mg at 03/03/21 0815  •  DULoxetine (CYMBALTA) DR capsule 30 mg, 30 mg, Oral, Daily, Sreedhar Bobby MD, 30 mg at 03/03/21 0814  •  famotidine (PEPCID) tablet 20 mg, 20 mg, Oral, BID AC, Sreedhar Bobby MD, 20 mg at 03/03/21 0633  •  ferrous sulfate tablet 325 mg, 325 mg, Oral, Daily With Breakfast, Sreedhar Bobby MD, 325 mg at 03/03/21 0814  •  fluconazole (DIFLUCAN) tablet 200 mg, 200 mg, Oral, Q24H, Som Martinez MD, 200 mg at 03/03/21 0816  •  gabapentin (NEURONTIN) capsule 300 mg, 300 mg, Oral, Q12H, Sreedhar Bobby MD, 300 mg at 03/03/21 0816  •  hydrOXYzine (ATARAX) tablet 25 mg, 25 mg, Oral, TID PRN, Sreedhar Bobby MD, 25 mg at 03/01/21 2131  •  levothyroxine (SYNTHROID, LEVOTHROID) tablet 137 mcg, 137 mcg, Oral, Q AM, Sreedhar Bobby MD, 137 mcg at 03/03/21 0633  •  magnesium hydroxide (MILK OF MAGNESIA) suspension 2400 mg/10mL 15 mL, 15 mL, Oral, Daily PRN, Uyen Martinez MD, 15 mL at 03/02/21 1135  •  Magnesium Sulfate 2 gram Bolus, followed by 8 gram infusion (total Mg dose 10 grams)- Mg less than or equal to 1mg/dL, 2 g, Intravenous, PRN **OR** Magnesium Sulfate 2 gram / 50mL Infusion (GIVE X 3 BAGS TO EQUAL 6GM TOTAL DOSE) - Mg 1.1 - 1.5 mg/dl, 2 g, Intravenous, PRN **OR** Magnesium Sulfate 4 gram infusion- Mg 1.6-1.9 mg/dL, 4 g, Intravenous, PRN, Sreedhar Bobby MD, Last Rate: 25 mL/hr at 02/22/21 1243, 4 g at 02/22/21 1243  •  metoprolol succinate XL (TOPROL-XL) 24 hr tablet 25 mg, 25 mg, Oral, Daily, Kanu  Sreedhar WANG MD, 25 mg at 02/26/21 0943  •  metroNIDAZOLE (FLAGYL) 500 mg/100mL IVPB, 500 mg, Intravenous, Q6H, Som Martinez MD, Last Rate: 100 mL/hr at 03/03/21 0816, 500 mg at 03/03/21 0816  •  Morphine sulfate (PF) injection 4 mg, 4 mg, Intravenous, Q12H PRN, Sreedhar Bobby MD, 4 mg at 03/02/21 1533  •  multivitamin (THERAGRAN) tablet 1 tablet, 1 tablet, Oral, Daily, Sreedhar Bobby MD, 1 tablet at 03/03/21 0814  •  oxyCODONE-acetaminophen (PERCOCET) 5-325 MG per tablet 1 tablet, 1 tablet, Oral, Q4H PRN, Sreedhar Bobby MD, 1 tablet at 03/02/21 2206  •  polyethylene glycol (MIRALAX) packet 17 g, 17 g, Oral, Daily, Uyen Martinez MD, 17 g at 03/03/21 0814  •  sodium chloride 0.9 % flush 10 mL, 10 mL, Intravenous, PRN, Sreedhar Bobby MD  •  Insert peripheral IV, , , Once **AND** sodium chloride 0.9 % flush 10 mL, 10 mL, Intravenous, PRN, Sreedhar Bobby MD  •  sodium chloride 0.9 % flush 10 mL, 10 mL, Intravenous, Q12H, Sreedhar Bobby MD, 10 mL at 03/03/21 0816  •  sodium chloride 0.9 % flush 10 mL, 10 mL, Intravenous, PRN, Sreedhar Bobby MD  •  sodium hypochlorite (DAKIN'S 1/4 STRENGTH) 0.125 % topical solution 0.125% solution, , Topical, Q12H, Sreedhar Bobby MD, Given at 03/03/21 0817  •  Vitamin D3 50,000 Units **PATIENT SUPPLIED MED**, 50,000 Units, Oral, Q7 Days, Sreedhar Bobby MD, 50,000 Units at 02/26/21 9401    Antibiotics:  Anti-Infectives (From admission, onward)    Ordered     Dose/Rate Route Frequency Start Stop    03/02/21 0952  DAPTOmycin (CUBICIN) 650 mg in sodium chloride 0.9 % 50 mL IVPB     Ordering Provider: Nabeel Martinez MD    6 mg/kg × 105 kg (Adjusted)  100 mL/hr over 30 Minutes Intravenous Every 24 Hours 03/02/21 1400 03/12/21 1359    03/02/21 0707  metroNIDAZOLE (FLAGYL) 500 mg/100mL IVPB     Ordering Provider: Som Martinez MD    500 mg  100 mL/hr over 60 Minutes Intravenous Every 6 Hours 03/02/21 0800 03/12/21 0759    02/26/21 0734   fluconazole (DIFLUCAN) tablet 200 mg     Som Martinez MD reviewed the order on 21 07.   Ordering Provider: Som Martinez MD    200 mg Oral Every 24 Hours 21 0900 21 0707    21 1458  cefTRIAXone (ROCEPHIN) 2 g/100 mL 0.9% NS IVPB (MBP)     Som Martinez MD reviewed the order on 21 0708.   Ordering Provider: Som Martinez MD    2 g  over 30 Minutes Intravenous Every 24 Hours 21 1600 21 1559    21 1226  DAPTOmycin (CUBICIN) 650 mg in sodium chloride 0.9 % 50 mL IVPB     Ordering Provider: Sreedhar Bobby MD    6 mg/kg × 105 kg (Adjusted)  100 mL/hr over 30 Minutes Intravenous Every 24 Hours 21 1315 21 1448    21 1851  meropenem (MERREM) 1 g/100 mL 0.9% NS VTB (mbp)     Ordering Provider: Amadou Templeton MD    1 g  over 30 Minutes Intravenous Once 21 1853 21 2028    21 1851  vancomycin 3000 mg/500 mL 0.9% NS IVPB ([facility])     Ordering Provider: Amadou Templeton MD    20 mg/kg × 167 kg Intravenous Once 21 1853 21 1939            Review of Systems:  See HPI    Physical Exam:   Vital Signs  Temp (24hrs), Av °F (36.7 °C), Min:97.9 °F (36.6 °C), Max:98.1 °F (36.7 °C)    Temp  Min: 97.9 °F (36.6 °C)  Max: 98.1 °F (36.7 °C)  BP  Min: 90/41  Max: 111/65  Pulse  Min: 51  Max: 59  Resp  Min: 18  Max: 20  SpO2  Min: 88 %  Max: 94 %    GENERAL: Awake and alert, in mild distress. She is obese and debilitated appearing  HEENT: Normocephalic, atraumatic.  PERRL. EOMI. No conjunctival injection. No icterus. Oropharynx clear without evidence of thrush or exudate.   NECK: Supple without nuchal rigidity.  HEART: RRR; No murmur,    LUNGS: Diminished at lung bases bilaterally without wheezing, rales, rhonchi. Normal respiratory effort. Nonlabored.  ABDOMEN: Soft, nontender, nondistended. No rebound or guarding.   EXT: Left thigh packing is in place with slight blood on the wound packing  :  Without Marx  catheter. With external urinary cath.    SKIN: No rashes.  Left groin as above.  NEURO: Oriented to PPT. Normal speech and cognition.   PSYCHIATRIC: Normal insight and judgment. Cooperative with PE    Laboratory Data    Results from last 7 days   Lab Units 03/03/21  0502 03/02/21  0452 03/01/21  0550   WBC 10*3/mm3 6.89 8.70 9.93   HEMOGLOBIN g/dL 12.5 11.3* 11.2*   HEMATOCRIT % 41.2 37.2 36.8   PLATELETS 10*3/mm3 256 230 224     Results from last 7 days   Lab Units 03/03/21  0502   SODIUM mmol/L 139   POTASSIUM mmol/L 4.1   CHLORIDE mmol/L 102   CO2 mmol/L 32.0*   BUN mg/dL 10   CREATININE mg/dL 0.71   GLUCOSE mg/dL 94   CALCIUM mg/dL 8.8                     Results from last 7 days   Lab Units 02/27/21  0525   CK TOTAL U/L 21         Estimated Creatinine Clearance: 113.1 mL/min (by C-G formula based on SCr of 0.71 mg/dL).      Microbiology:  Microbiology Results (last 10 days)     Procedure Component Value - Date/Time    Anaerobic Culture - Tissue, Thigh, Left [[id number]] Collected: 02/27/21 1402    Lab Status: Preliminary result Specimen: Tissue from Thigh, Left Updated: 03/02/21 0712     Anaerobic Culture No anaerobes isolated at 3 days    Tissue / Bone Culture - Tissue, Thigh, Left [[id number]] Collected: 02/27/21 1402    Lab Status: Final result Specimen: Tissue from Thigh, Left Updated: 03/02/21 0742     Tissue Culture No growth at 3 days     Gram Stain Many (4+) WBCs seen      No organisms seen    AFB Culture - Tissue, Thigh, Left [[id number]] Collected: 02/27/21 1402    Lab Status: Preliminary result Specimen: Tissue from Thigh, Left Updated: 02/28/21 1034     AFB Stain No acid fast bacilli seen on concentrated smear    Anaerobic Culture - Tissue, Thigh, Left [[id number]]  (Abnormal) Collected: 02/26/21 1524    Lab Status: Final result Specimen: Tissue from Thigh, Left Updated: 03/01/21 1246     Anaerobic Culture Prevotella loescheii    Wound Culture - Swab, Thigh, Left [[id number]] Collected: 02/26/21 1528     Lab Status: Final result Specimen: Swab from Thigh, Left Updated: 03/01/21 0620     Wound Culture No growth at 3 days     Gram Stain No organisms seen      Many (4+) WBCs per low power field      Rare (1+) Epithelial cells per low power field    Urine Culture - Urine, Urine, Clean Catch [[id number]]  (Abnormal) Collected: 02/25/21 1611    Lab Status: Final result Specimen: Urine, Clean Catch Updated: 02/26/21 1300     Urine Culture Yeast isolated     Comment: No further workup.                 Radiology:  Imaging Results (Last 72 Hours)     ** No results found for the last 72 hours. **      Left thigh ultrasound reveals a developing abscess      Impression:   1. Left groin cellulitis/Abscess-Status post debridement.  Her cultures have grown Prevotella but were antibiotic modified and the purulent material was not malodorous at the time of surgery. I will plan to discontinue ceftriaxone and daptomycin.  I will start her on Cefdinir and switch her Flagyl to by mouth.  2. Elevated procalcitonin  3. Encephalopathy, improving  4. Rheumatoid arthritis  5. Sjogren's disease  6. DVT/Xarelto  7. MAHESH/CPAP  8. CKD IIIa  9. Hypothyroidism  10. Essential hypertension  11. Morbid Obesity  12. Ongoing tobacco abuse  13. Pcn allergy (hives and shortness of breath).  Has tolerated Keflex in the remote past.   14. Funguria-on fluconazole therapy.    PLAN/RECOMMENDATIONS:   1.  Discontinue daptomycin  2.  Discontinue ceftriaxone  3.  Left thigh wound care  4.  Fluconazole 200 mg by mouth daily  5.  Change Flagyl to 500 mg by mouth 3 times a day with food  6.   Cefdinir 300 mg by mouth twice a day          Som Martinez MD   3/3/2021  08:28 EST

## 2021-03-03 NOTE — PROGRESS NOTES
"General Surgery Post op Follow Up Note    Subjective:   Complains of constipation.  She is tolerating her dressing changes well.    /58 (BP Location: Right arm, Patient Position: Lying)   Pulse 58   Temp 97.9 °F (36.6 °C) (Oral)   Resp 20   Ht 172.7 cm (68\")   Wt (!) 167 kg (368 lb 2.7 oz)   SpO2 92%   BMI 55.98 kg/m²     General Appearance:  in no acute distress  Left thigh: incision is clean and dry    CBC  Results from last 7 days   Lab Units 03/03/21  0502   WBC 10*3/mm3 6.89   HEMOGLOBIN g/dL 12.5   HEMATOCRIT % 41.2   PLATELETS 10*3/mm3 256       CMP/BMP  Results from last 7 days   Lab Units 03/03/21  0502   SODIUM mmol/L 139   POTASSIUM mmol/L 4.1   CHLORIDE mmol/L 102   CO2 mmol/L 32.0*   BUN mg/dL 10   CREATININE mg/dL 0.71   CALCIUM mg/dL 8.8   GLUCOSE mg/dL 94         ASSESSMENT/PLAN:  Status post incision and drainage left thigh abscess.    Tolerating dressing changes.  May try a VAC dressing though I think it may be difficult to get a good seal in her groin.  May resume Xarelto.    Noe Ken MD  3/3/2021  12:19 EST  "

## 2021-03-03 NOTE — THERAPY TREATMENT NOTE
Patient Name: Raquel Carranza  : 1955    MRN: [id number]                              Today's Date: 3/3/2021       Admit Date: 2021    Visit Dx:     ICD-10-CM ICD-9-CM   1. Sepsis, due to unspecified organism, unspecified whether acute organ dysfunction present (CMS/HCC)  A41.9 038.9     995.91   2. Left leg cellulitis  L03.116 682.6   3. Abscess of left thigh  L02.416 682.6     Patient Active Problem List   Diagnosis   • History of deep vein thrombosis of lower extremity   • Special screening for malignant neoplasms, colon   • Sepsis due to cellulitis (CMS/HCC)   • RFAA (acute kidney injury) (CMS/HCC)   • Arrhythmia   • Hypertension   • 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 rheumatoid factor (CMS/HCC)   • Sjogren's disease (CMS/HCC)   • Acquired hypothyroidism   • CKD (chronic kidney disease)   • Altered mental status   • Hypoxia   • Word finding difficulty     Past Medical History:   Diagnosis Date   • Abnormal glucose    • Acquired hypothyroidism    • Anxiety    • Arrhythmia    • Arthritis    • Bilateral edema of lower extremity    • Chronic joint pain    • Hypertension    • Iron deficiency anemia    • Irritable bowel syndrome    • 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 rheumatoid factor (CMS/HCC)    • Sjogren's disease (CMS/HCC)    • Thrombosis of right saphenous vein    • Vitamin D deficiency      Past Surgical History:   Procedure Laterality Date   • APPENDECTOMY     • HERNIA REPAIR     • LEG EXCISION LESION/CYST Left 2021    Procedure: INCISION AND DRAINAGE LEFT THIGH ABCESS;  Surgeon: Noe Ken MD;  Location:  [facility];  Service: General;  Laterality: Left;   • LEG EXCISION LESION/CYST Left 2021    Procedure: WOUND EXPLORATION LEFT THIGH FOR CONTROL OF BLEEDING;  Surgeon: Sreedhar Bobby MD;  Location:  [facility];  Service: General;  Laterality: Left;     General Information     Row Name 21 8002          Physical Therapy  Time and Intention    Document Type  therapy note (daily note)  -EJ     Mode of Treatment  physical therapy  -     Row Name 03/03/21 1420          General Information    Patient Profile Reviewed  yes  -EJ     Existing Precautions/Restrictions  fall;oxygen therapy device and L/min;other (see comments) Now s/p I&D 2/26 with 2nd exploratory sx for bleeding2/27 POD 2.  -EJ     Barriers to Rehab  medically complex  -     Row Name 03/03/21 1420          Living Environment    Lives With  alone  -     Row Name 03/03/21 1420          Cognition    Orientation Status (Cognition)  oriented x 4  -     Row Name 03/03/21 1420          Safety Issues, Functional Mobility    Safety Issues Affecting Function (Mobility)  awareness of need for assistance;safety precautions follow-through/compliance;safety precaution awareness  -     Impairments Affecting Function (Mobility)  endurance/activity tolerance;pain;range of motion (ROM);shortness of breath;strength  -       User Key  (r) = Recorded By, (t) = Taken By, (c) = Cosigned By    Initials Name Provider Type     Donya Foreman PT Physical Therapist        Mobility     Row Name 03/03/21 1428          Bed Mobility    Comment (Bed Mobility)  pt [facility] and returns to [place].  -     Row Name 03/03/21 1428          Bed-Chair Transfer    Bed-Chair [place] (Transfers)  standby assist;verbal cues  -     Assistive Device (Bed-Chair Transfers)  walker, front-wheeled;bariatric  -     Row Name 03/03/21 1428          Sit-Stand Transfer    Sit-Stand [place] (Transfers)  standby assist;verbal cues  -     Assistive Device (Sit-Stand Transfers)  walker, front-wheeled;bariatric  -     Row Name 03/03/21 1428          Gait/Stairs (Locomotion)    [place] Level (Gait)  contact guard  -     Assistive Device (Gait)  walker, front-wheeled  -     Distance in Feet (Gait)  90  -EJ     Deviations/Abnormal Patterns (Gait)  bilateral deviations;juvenal decreased;gait speed  "decreased;weight shifting decreased  -EJ     Bilateral Gait Deviations  forward flexed posture;heel strike decreased  -EJ     Comment (Gait/Stairs)  Wide base. 6L on portable O2 tank (did not have 5th L setting). Pt reports \"woosiness\" throghout 2/2 medication.  -EJ       User Key  (r) = Recorded By, (t) = Taken By, (c) = Cosigned By    Initials Name Provider Type    Donya Marcelino PT Physical Therapist        Obj/Interventions     Row Name 03/03/21 1435          Motor Skills    Therapeutic Exercise  -- deferred. Pt wanted to get to [facility],  -EJ       User Key  (r) = Recorded By, (t) = Taken By, (c) = Cosigned By    Initials Name Provider Type    Donya Marcelino PT Physical Therapist        Goals/Plan    No documentation.       Clinical Impression     Row Name 03/03/21 1436          Pain    Additional Documentation  Pain Scale: FACES Pre/Post-Treatment (Group)  -EJ     Row Name 03/03/21 1436          Pain Scale: Numbers Pre/Post-Treatment    Pretreatment Pain Rating  0/10 - no pain  -EJ     Posttreatment Pain Rating  0/10 - no pain  -EJ     Pain Location - Side  Left  -EJ     Pain Location - Orientation  generalized  -EJ     Pain Intervention(s)  Repositioned;Ambulation/increased activity  -EJ     Row Name 03/03/21 1436          Pain Scale: FACES Pre/Post-Treatment    Pain: FACES Scale, Pretreatment  0-->no hurt  -EJ     Posttreatment Pain Rating  2-->hurts little bit  -EJ     Pain Location - Side  Left  -EJ     Pain Location - Orientation  incisional  -EJ     Pain Location  extremity  -EJ     Row Name 03/03/21 1436          Plan of Care Review    Plan of Care Reviewed With  patient  -EJ     Progress  improving  -EJ     Outcome Summary  Pt ambulates 90 ft in ross with RWx, CGA, on 5-6L per NC throughout. Upon return to room pt sat on BSC. RN notified.  -EJ     Row Name 03/03/21 1436          Vital Signs    Pre SpO2 (%)  93  -EJ     O2 Delivery Pre Treatment  supplemental O2  -EJ     O2 Delivery Intra " Treatment  supplemental O2  -EJ     O2 Delivery Post Treatment  supplemental O2  -EJ     Pre Patient Position  Sitting  -EJ     Intra Patient Position  Standing  -EJ     Post Patient Position  Sitting  -EJ     Row Name 03/03/21 1436          Positioning and Restraints    Pre-Treatment Position  sitting in chair/recliner  -EJ     Post Treatment Position  chair  -EJ     In Chair  notified nsg;sitting;call light within reach;encouraged to call for assist;exit alarm on;waffle cushion  -EJ       User Key  (r) = Recorded By, (t) = Taken By, (c) = Cosigned By    Initials Name Provider Type    Donya Marcelino PT Physical Therapist        Outcome Measures     Row Name 03/03/21 1440          How much help from another person do you currently need...    Turning from your back to your side while in flat bed without using bedrails?  3  -EJ     Moving from lying on back to sitting on the side of a flat bed without bedrails?  3  -EJ     Moving to and from a bed to a chair (including a wheelchair)?  3  -EJ     Standing up from a chair using your arms (e.g., wheelchair, bedside chair)?  3  -EJ     Climbing 3-5 steps with a railing?  2  -EJ     To walk in hospital room?  3  -EJ     AM-PAC 6 Clicks Score (PT)  17  -EJ     Row Name 03/03/21 1440          Functional Assessment    Outcome Measure Options  AM-PAC 6 Clicks Basic Mobility (PT)  -EJ       User Key  (r) = Recorded By, (t) = Taken By, (c) = Cosigned By    Initials Name Provider Type    Donya Marcelino PT Physical Therapist        Physical Therapy Education                 Title: PT OT SLP Therapies (In Progress)     Topic: Physical Therapy (In Progress)     Point: Mobility training (In Progress)     Learning Progress Summary           Patient Acceptance, E, NR by WAQAR at 3/3/2021 1442    Acceptance, E, NR by WAQAR at 3/1/2021 1521    Acceptance, E, NR by WAQAR at 2/26/2021 1107    Acceptance, E, NR by AS at 2/23/2021 1129    Acceptance, E, NR by WAQAR at 2/22/2021 9856     Acceptance, E, NR by LM at 2/20/2021 1038                   Point: Home exercise program (In Progress)     Learning Progress Summary           Patient Acceptance, E, NR by EJ at 3/3/2021 1442    Acceptance, E, NR by EJ at 3/1/2021 1521    Acceptance, E, NR by EJ at 2/26/2021 1107    Acceptance, E, NR by AS at 2/23/2021 1129    Acceptance, E, NR by EJ at 2/22/2021 1648                   Point: Precautions (In Progress)     Learning Progress Summary           Patient Acceptance, E, NR by EJ at 3/3/2021 1442    Acceptance, E, NR by EJ at 3/1/2021 1521    Acceptance, E, NR by EJ at 2/26/2021 1107    Acceptance, E, NR by AS at 2/23/2021 1129    Acceptance, E, NR by EJ at 2/22/2021 1648    Acceptance, E, NR by LM at 2/20/2021 1038                               User Key     Initials Effective Dates Name Provider Type Discipline     11/20/18 -  Donya Foreman, PT Physical Therapist PT    AS 06/22/15 -  Araceli Saravia, PTA Physical Therapy Assistant PT     07/24/19 -  Megan Peralta PT Physical Therapist PT              PT Recommendation and Plan     Plan of Care Reviewed With: patient  Progress: improving  Outcome Summary: Pt ambulates 90 ft in ross with RWx, CGA, on 5-6L per NC throughout. Upon return to room pt sat on BSC. RN notified.     Time Calculation:   PT Charges     Row Name 03/03/21 1442             Time Calculation    Start Time  1147  -EJ      PT Received On  03/03/21  -      PT Goal Re-Cert Due Date  03/13/21  -         Time Calculation- PT    Total Timed Code Minutes- PT  21 minute(s)  -EJ         Timed Charges    79744 - PT Therapeutic Activity Minutes  21  -EJ        User Key  (r) = Recorded By, (t) = Taken By, (c) = Cosigned By    Initials Name Provider Type    EJ Donya Foreman, PT Physical Therapist        Therapy Charges for Today     Code Description Service Date Service Provider Modifiers Qty    50219491598 HC PT THERAPEUTIC ACT EA 15 MIN 3/3/2021 Donya Foreman, PT GP 1           PT G-Codes  Outcome Measure Options: AM-PAC 6 Clicks Basic Mobility (PT)  AM-PAC 6 Clicks Score (PT): 17  AM-PAC 6 Clicks Score (OT): 19    Donya Trujillo, PT  3/3/2021

## 2021-03-03 NOTE — PROGRESS NOTES
Continued Stay Note  [facility]     Patient Name: Raquel Carranza  MRN: [id number]  Today's Date: 3/3/2021    Admit Date: 2/19/2021    Discharge Plan     Row Name 03/03/21 1621       Plan    Plan  [facility] - will need precert    Patient/Family in Agreement with Plan  other (see comments)    Plan Comments  Updated April with [facility]. Patient may have a wound vac. She will be on PO antibiotics and PO pain medication. Xarelto to continue. Will need precert for [facility] closer to discharge.    Final Discharge Disposition Code  62 - inpatient rehab facility        Discharge Codes    No documentation.       Expected Discharge Date and Time     Expected Discharge Date Expected Discharge Time    Mar 5, 2021             Maegan Love RN

## 2021-03-04 LAB
BACTERIA SPEC ANAEROBE CULT: NORMAL
CYTO UR: NORMAL
LAB AP CASE REPORT: NORMAL
LAB AP CLINICAL INFORMATION: NORMAL
PATH REPORT.FINAL DX SPEC: NORMAL
PATH REPORT.GROSS SPEC: NORMAL

## 2021-03-04 PROCEDURE — 97530 THERAPEUTIC ACTIVITIES: CPT

## 2021-03-04 PROCEDURE — 97535 SELF CARE MNGMENT TRAINING: CPT

## 2021-03-04 PROCEDURE — 97605 NEG PRS WND THER DME<=50SQCM: CPT

## 2021-03-04 PROCEDURE — 99232 SBSQ HOSP IP/OBS MODERATE 35: CPT | Performed by: NURSE PRACTITIONER

## 2021-03-04 PROCEDURE — 97164 PT RE-EVAL EST PLAN CARE: CPT

## 2021-03-04 RX ORDER — OXYCODONE HYDROCHLORIDE AND ACETAMINOPHEN 5; 325 MG/1; MG/1
0.5 TABLET ORAL EVERY 4 HOURS PRN
Status: DISCONTINUED | OUTPATIENT
Start: 2021-03-04 | End: 2021-03-05 | Stop reason: HOSPADM

## 2021-03-04 RX ORDER — GABAPENTIN 300 MG/1
600 CAPSULE ORAL NIGHTLY
Status: DISCONTINUED | OUTPATIENT
Start: 2021-03-04 | End: 2021-03-05 | Stop reason: HOSPADM

## 2021-03-04 RX ORDER — BACLOFEN 10 MG/1
10 TABLET ORAL 3 TIMES DAILY PRN
Status: DISCONTINUED | OUTPATIENT
Start: 2021-03-04 | End: 2021-03-05 | Stop reason: HOSPADM

## 2021-03-04 RX ADMIN — METRONIDAZOLE 500 MG: 500 TABLET ORAL at 06:32

## 2021-03-04 RX ADMIN — SODIUM CHLORIDE, PRESERVATIVE FREE 10 ML: 5 INJECTION INTRAVENOUS at 21:13

## 2021-03-04 RX ADMIN — SODIUM CHLORIDE, PRESERVATIVE FREE 10 ML: 5 INJECTION INTRAVENOUS at 09:42

## 2021-03-04 RX ADMIN — FERROUS SULFATE TAB 325 MG (65 MG ELEMENTAL FE) 325 MG: 325 (65 FE) TAB at 09:37

## 2021-03-04 RX ADMIN — DULOXETINE HYDROCHLORIDE 30 MG: 30 CAPSULE, DELAYED RELEASE ORAL at 09:37

## 2021-03-04 RX ADMIN — FAMOTIDINE 20 MG: 20 TABLET, FILM COATED ORAL at 17:12

## 2021-03-04 RX ADMIN — FLUCONAZOLE 200 MG: 200 TABLET ORAL at 09:37

## 2021-03-04 RX ADMIN — GABAPENTIN 600 MG: 300 CAPSULE ORAL at 21:12

## 2021-03-04 RX ADMIN — ASPIRIN 81 MG: 81 TABLET, CHEWABLE ORAL at 09:37

## 2021-03-04 RX ADMIN — CEFDINIR 300 MG: 300 CAPSULE ORAL at 09:37

## 2021-03-04 RX ADMIN — RIVAROXABAN 20 MG: 20 TABLET, FILM COATED ORAL at 17:12

## 2021-03-04 RX ADMIN — LEVOTHYROXINE SODIUM 137 MCG: 0.14 TABLET ORAL at 06:32

## 2021-03-04 RX ADMIN — DOCUSATE SODIUM 100 MG: 100 CAPSULE, LIQUID FILLED ORAL at 09:37

## 2021-03-04 RX ADMIN — METRONIDAZOLE 500 MG: 500 TABLET ORAL at 15:01

## 2021-03-04 RX ADMIN — FAMOTIDINE 20 MG: 20 TABLET, FILM COATED ORAL at 06:32

## 2021-03-04 RX ADMIN — MULTIVITAMIN TABLET 1 TABLET: TABLET at 09:37

## 2021-03-04 RX ADMIN — ALLOPURINOL 300 MG: 300 TABLET ORAL at 09:37

## 2021-03-04 RX ADMIN — SODIUM HYPOCHLORITE: 1.25 SOLUTION TOPICAL at 09:42

## 2021-03-04 RX ADMIN — CEFDINIR 300 MG: 300 CAPSULE ORAL at 21:12

## 2021-03-04 RX ADMIN — METRONIDAZOLE 500 MG: 500 TABLET ORAL at 21:12

## 2021-03-04 NOTE — PROGRESS NOTES
"General Surgery Post op Follow Up Note    Subjective:   Feeling better each day. Better left thigh mobility.    BP 95/63 (BP Location: Right arm, Patient Position: Sitting)   Pulse 64   Temp 97.9 °F (36.6 °C) (Oral)   Resp 18   Ht 172.7 cm (68\")   Wt (!) 167 kg (368 lb 2.7 oz)   SpO2 91%   BMI 55.98 kg/m²     General Appearance:  in no acute distress  Left thigh: incision is clean and dry, dressed    CBC  Results from last 7 days   Lab Units 03/03/21  0502   WBC 10*3/mm3 6.89   HEMOGLOBIN g/dL 12.5   HEMATOCRIT % 41.2   PLATELETS 10*3/mm3 256       CMP/BMP  Results from last 7 days   Lab Units 03/03/21  0502   SODIUM mmol/L 139   POTASSIUM mmol/L 4.1   CHLORIDE mmol/L 102   CO2 mmol/L 32.0*   BUN mg/dL 10   CREATININE mg/dL 0.71   CALCIUM mg/dL 8.8   GLUCOSE mg/dL 94         ASSESSMENT/PLAN:    Left thigh abscess s/p I&D    Awaiting VAC to be placed    Noe Ken MD  3/4/2021  12:53 EST  "

## 2021-03-04 NOTE — THERAPY TREATMENT NOTE
Patient Name: Raquel Carranza  : 1955    MRN: [id number]                              Today's Date: 3/4/2021       Admit Date: 2021    Visit Dx:     ICD-10-CM ICD-9-CM   1. Sepsis, due to unspecified organism, unspecified whether acute organ dysfunction present (CMS/HCC)  A41.9 038.9     995.91   2. Left leg cellulitis  L03.116 682.6   3. Abscess of left thigh  L02.416 682.6     Patient Active Problem List   Diagnosis   • History of deep vein thrombosis of lower extremity   • Special screening for malignant neoplasms, colon   • Sepsis due to cellulitis (CMS/HCC)   • RAFA (acute kidney injury) (CMS/HCC)   • Arrhythmia   • Hypertension   • 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 rheumatoid factor (CMS/HCC)   • Sjogren's disease (CMS/HCC)   • Acquired hypothyroidism   • CKD (chronic kidney disease)   • Altered mental status   • Hypoxia   • Word finding difficulty     Past Medical History:   Diagnosis Date   • Abnormal glucose    • Acquired hypothyroidism    • Anxiety    • Arrhythmia    • Arthritis    • Bilateral edema of lower extremity    • Chronic joint pain    • Hypertension    • Iron deficiency anemia    • Irritable bowel syndrome    • 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 rheumatoid factor (CMS/HCC)    • Sjogren's disease (CMS/HCC)    • Thrombosis of right saphenous vein    • Vitamin D deficiency      Past Surgical History:   Procedure Laterality Date   • APPENDECTOMY     • HERNIA REPAIR     • LEG EXCISION LESION/CYST Left 2021    Procedure: INCISION AND DRAINAGE LEFT THIGH ABCESS;  Surgeon: Noe Ken MD;  Location:  [facility];  Service: General;  Laterality: Left;   • LEG EXCISION LESION/CYST Left 2021    Procedure: WOUND EXPLORATION LEFT THIGH FOR CONTROL OF BLEEDING;  Surgeon: Sreedhar Bobby MD;  Location:  [facility];  Service: General;  Laterality: Left;     General Information     Row Name 21 8023          OT Time and  Intention    Document Type  therapy note (daily note)  -KF     Mode of Treatment  occupational therapy  -KF     Row Name 03/04/21 1353          General Information    Patient Profile Reviewed  yes  -KF     Existing Precautions/Restrictions  fall;oxygen therapy device and L/min;other (see comments) L thigh incision  -KF     Barriers to Rehab  medically complex  -KF     Row Name 03/04/21 1353          Cognition    Orientation Status (Cognition)  oriented x 4  -KF     Row Name 03/04/21 1353          Safety Issues, Functional Mobility    Safety Issues Affecting Function (Mobility)  insight into deficits/self-awareness;awareness of need for assistance;safety precaution awareness  -KF     Impairments Affecting Function (Mobility)  endurance/activity tolerance;pain;range of motion (ROM);shortness of breath;strength  -KF       User Key  (r) = Recorded By, (t) = Taken By, (c) = Cosigned By    Initials Name Provider Type    KF Faustina Pérez OT Occupational Therapist          Mobility/ADL's     Row Name 03/04/21 1354          Bed Mobility    Bed Mobility  scooting/bridging;supine-sit  -KF     Scooting/Bridging [place] (Bed Mobility)  modified independence  -KF     Supine-Sit [place] (Bed Mobility)  modified independence  -KF     Bed Mobility, Safety Issues  other (see comments) none  -KF     Assistive Device (Bed Mobility)  bed rails;head of bed elevated  -KF     Comment (Bed Mobility)  mod I throughout bed mob  -KF     Row Name 03/04/21 1354          Transfers    Transfers  sit-stand transfer;bed-chair transfer;toilet transfer  -KF     Comment (Transfers)  BSC across room then to chair post ADLs standing sink side, progressing activity tolerance good safety with bariatric FWW  -KF     Bed-Chair [place] (Transfers)  standby assist;verbal cues  -KF     Assistive Device (Bed-Chair Transfers)  walker, front-wheeled;bariatric  -KF     Sit-Stand [place] (Transfers)  standby assist;verbal cues  -KF      [place] Level (Toilet Transfer)  standby assist;verbal cues  -     Assistive Device (Toilet Transfer)  walker, front-wheeled;bariatric equipment;commode, bedside without drop arms  -     Row Name 03/04/21 [facility]          Sit-Stand Transfer    Assistive Device (Sit-Stand Transfers)  walker, front-wheeled;bariatric  -     Row Name 03/04/21 135          Toilet Transfer    Type (Toilet Transfer)  stand pivot/stand step  -     Row Name 03/04/21 [facility]          Functional Mobility    Functional Mobility- Ind. Level  contact guard assist  -     Functional Mobility- Device  rolling walker  -     Functional Mobility-Distance (Feet)  14  -     Functional Mobility- Safety Issues  supplemental O2;step length decreased  -     Functional Mobility- Comment  steps within room too fatigued post toileting and standing ADLs at sink side to progress functional mob at this time to further distance  -     Row Name 03/04/21 [facility]          Activities of Daily Living    BADL Assessment/Intervention  upper body dressing;toileting;grooming  -     Row Name 03/04/21 [facility]          Upper Body Dressing Assessment/Training    [place] Level (Upper Body Dressing)  don;front opening garment;set up  -KF     Position (Upper Body Dressing)  edge of bed sitting  -     Row Name 03/04/21 [facility]          Grooming Assessment/Training    [place] Level (Grooming)  wash face, hands;oral care regimen;standby assist  -KF     Position (Grooming)  supported standing;sink side  -KF     Comment (Grooming)  progressed to standing for oral hygiene cues  -     Row Name 03/04/21 [facility]          Toileting Assessment/Training    [place] Level (Toileting)  adjust/manage clothing;perform perineal hygiene;maximum assist (25% patient effort);contact guard assist  -KF     Position (Toileting)  supported standing;supported sitting  -KF     Comment (Toileting)  progressed to CGA for clothing management  -KF       User Key  (r) = Recorded By, (t)  = Taken By, (c) = Cosigned By    Initials Name Provider Type    Faustina Garcia OT Occupational Therapist        Obj/Interventions     Row Name 03/04/21 9919          Balance    Balance Assessment  sitting static balance;sitting dynamic balance;standing static balance;standing dynamic balance  -KF     Static Sitting Balance  WNL  -KF     Dynamic Sitting Balance  WNL  -KF     Static Standing Balance  WFL;supported;standing  -KF     Dynamic Standing Balance  mild impairment;supported;standing  -KF     Balance Interventions  sitting;standing;occupation based/functional task;weight shifting activity;UE activity with balance activity  -KF       User Key  (r) = Recorded By, (t) = Taken By, (c) = Cosigned By    Initials Name Provider Type    Faustina Garcia, OT Occupational Therapist        Goals/Plan    No documentation.       Clinical Impression     Row Name 03/04/21 1400          Pain Assessment    Additional Documentation  Pain Scale: Numbers Pre/Post-Treatment (Group)  -KF     Row Name 03/04/21 1400          Pain Scale: Numbers Pre/Post-Treatment    Pretreatment Pain Rating  0/10 - no pain  -KF     Posttreatment Pain Rating  0/10 - no pain  -KF     Pain Location - Orientation  generalized  -KF     Pre/Posttreatment Pain Comment  tolerated  -KF     Pain Intervention(s)  Ambulation/increased activity;Repositioned  -KF     Row Name 03/04/21 1400          Plan of Care Review    Plan of Care Reviewed With  patient  -KF     Progress  improving  -KF     Outcome Summary  Pt completed bed mob [place], SBA STS at W x4, 14 ft functional mob CGA at [facility], completed toileting CGA clothing management max A toilet hygiene with methods at home to allow for modified independence, tolerated standing ADL tasks sink side o2 above 90% throughout on 4L, cont IPOT per POC  -KF     Row Name 03/04/21 1400          Therapy Assessment/Plan (OT)    Rehab Potential (OT)  good, to achieve stated therapy goals  -KF     Criteria for  Skilled Therapeutic Interventions Met (OT)  yes;meets criteria;skilled treatment is necessary  -KF     Therapy Frequency (OT)  daily  -KF     Row Name 03/04/21 1400          Therapy Plan Review/Discharge Plan (OT)    Anticipated Discharge Disposition (OT)  home with assist;home with home health  -KF     Row Name 03/04/21 1400          Vital Signs    Pre Systolic BP Rehab  95 no dizziness throughout  -KF     Pre Treatment Diastolic BP  63  -KF     Pre SpO2 (%)  93  -KF     O2 Delivery Pre Treatment  supplemental O2 4L  -KF     Intra SpO2 (%)  90  -KF     O2 Delivery Intra Treatment  supplemental O2  -KF     Post SpO2 (%)  93  -KF     O2 Delivery Post Treatment  supplemental O2  -KF     Pre Patient Position  Supine  -KF     Intra Patient Position  Standing  -KF     Post Patient Position  Sitting  -KF     Rest Breaks   1  -KF     Row Name 03/04/21 1400          Positioning and Restraints    Pre-Treatment Position  in bed  -KF     Post Treatment Position  chair  -KF     In Chair  notified nsg;sitting;call light within reach;encouraged to call for assist;waffle cushion  -KF       User Key  (r) = Recorded By, (t) = Taken By, (c) = Cosigned By    Initials Name Provider Type    KF Faustina Pérez, OT Occupational Therapist        Outcome Measures     Row Name 03/04/21 1407          How much help from another is currently needed...    Putting on and taking off regular lower body clothing?  3  -KF     Bathing (including washing, rinsing, and drying)  3  -KF     Toileting (which includes using toilet bed pan or urinal)  3  -KF     Putting on and taking off regular upper body clothing  4  -KF     Taking care of personal grooming (such as brushing teeth)  3  -KF     Eating meals  4  -KF     AM-PAC 6 Clicks Score (OT)  20  -KF     Row Name 03/04/21 1407          Functional Assessment    Outcome Measure Options  AM-PAC 6 Clicks Daily Activity (OT)  -KF       User Key  (r) = Recorded By, (t) = Taken By, (c) = Cosigned By     Initials Name Provider Type    Faustina Garcia OT Occupational Therapist        Occupational Therapy Education                 Title: PT OT SLP Therapies (In Progress)     Topic: Occupational Therapy (Done)     Point: ADL training (Done)     Description:   Instruct learner(s) on proper safety adaptation and remediation techniques during self care or transfers.   Instruct in proper use of assistive devices.              Learning Progress Summary           Patient Acceptance, E,TB,D, VU,DU by  at 3/4/2021 1408    Acceptance, E,TB,D, VU,DU by  at 3/3/2021 1156    Acceptance, E,TB,D, VU,DU by  at 3/2/2021 1320    Acceptance, E,TB,D, VU,DU by  at 2/26/2021 0942    Acceptance, E, VU,NR by  at 2/23/2021 0949    Acceptance, E,TB,D, VU,DU by  at 2/20/2021 1514                   Point: Home exercise program (Done)     Description:   Instruct learner(s) on appropriate technique for monitoring, assisting and/or progressing therapeutic exercises/activities.              Learning Progress Summary           Patient Acceptance, E,TB,D, VU,DU by  at 3/4/2021 1408    Acceptance, E,TB,D, VU,DU by  at 3/3/2021 1156    Acceptance, E,TB,D, VU,DU by  at 3/2/2021 1320    Acceptance, E,TB,D, VU,DU by  at 2/26/2021 0942    Acceptance, E,TB,D, VU,DU by  at 2/20/2021 1514                   Point: Precautions (Done)     Description:   Instruct learner(s) on prescribed precautions during self-care and functional transfers.              Learning Progress Summary           Patient Acceptance, E,TB,D, VU,DU by  at 3/4/2021 1408    Acceptance, E,TB,D, VU,DU by  at 3/3/2021 1156    Acceptance, E,TB,D, VU,DU by  at 3/2/2021 1320    Acceptance, E,TB,D, VU,DU by  at 2/26/2021 0942    Acceptance, E, VU,NR by  at 2/23/2021 0949    Acceptance, E,TB,D, VU,DU by  at 2/20/2021 1514                   Point: Body mechanics (Done)     Description:   Instruct learner(s) on proper positioning and spine alignment during  self-care, functional mobility activities and/or exercises.              Learning Progress Summary           Patient Acceptance, E,TB,D, VU,DU by  at 3/4/2021 1408    Acceptance, E,TB,D, VU,DU by  at 3/3/2021 1156    Acceptance, E,TB,D, VU,DU by KF at 3/2/2021 1320    Acceptance, E,TB,D, VU,DU by KF at 2/26/2021 0942    Acceptance, E, VU,NR by  at 2/23/2021 0949    Acceptance, E,TB,D, VU,DU by  at 2/20/2021 1514                               User Key     Initials Effective Dates Name Provider Type Discipline     04/03/18 -  Faustina Pérez OT Occupational Therapist OT    [facility] 07/18/19 -  Tiny Casillas OT Occupational Therapist OT              OT Recommendation and Plan  Planned Therapy Interventions (OT): activity tolerance training, functional balance retraining, occupation/activity based interventions, ROM/therapeutic exercise, strengthening exercise, transfer/mobility retraining, BADL retraining, patient/caregiver education/training  Therapy Frequency (OT): daily  Plan of Care Review  Plan of Care Reviewed With: patient  Progress: improving  Outcome Summary: Pt completed bed mob [place], SBA STS at FWW x4, 14 ft functional mob CGA at W, completed toileting CGA clothing management max A toilet hygiene with methods at home to allow for modified independence, tolerated standing ADL tasks sink side o2 above 90% throughout on 4L, cont IPOT per POC     Time Calculation:   Time Calculation- OT     Row Name 03/04/21 1313             Time Calculation- OT    OT Start Time  1313  -      OT Received On  03/04/21  -         Timed Charges    49615 - OT Therapeutic Activity Minutes  18  -KF      62168 - OT Self Care/Mgmt Minutes  20  -KF        User Key  (r) = Recorded By, (t) = Taken By, (c) = Cosigned By    Initials Name Provider Type     Faustina Pérez, OT Occupational Therapist        Therapy Charges for Today     Code Description Service Date Service Provider Modifiers Qty    04115711158 HC OT SELF  CARE/MGMT/TRAIN EA 15 MIN 3/3/2021 Faustina Pérez, OT GO 1    46411426020 HC OT SELF CARE/MGMT/TRAIN EA 15 MIN 3/4/2021 Faustina Pérez OT GO 2    61858612830 HC OT THERAPEUTIC ACT EA 15 MIN 3/4/2021 Faustina Pérez OT GO 1               Faustina Pérez OT  3/4/2021

## 2021-03-04 NOTE — DISCHARGE PLACEMENT REQUEST
"Tiff Amador (65 y.o. Female)     Date of Birth Social Security Number Address Home Phone MRN    1955  [place] [id number] [phone] [id number]    [employer] Marital Status          Synagogue        Admission Date Admission Type Admitting Provider Attending Provider Department, Room/Bed    21 Emergency Siena Shell DO Roesch, Lyndsey, DO [facility] 4H, S473/1    Discharge Date Discharge Disposition Discharge Destination                       Attending Provider: Siena Shell DO    Allergies: Ace Inhibitors, Penicillins, Adacel [Tetanus-diphth-acell Pertussis], Lisinopril, Zostavax [Zoster Vaccine Live], Thimerosal    Isolation: None   Infection: None   Code Status: CPR    Ht: 172.7 cm (68\")   Wt: 167 kg (368 lb 2.7 oz)    Admission Cmt: None   Principal Problem: Sepsis due to cellulitis (CMS/[facility]) [L03.90,A41.9]                 Active Insurance as of 2021     Primary Coverage     Payor Plan Insurance Group Employer/Plan Group    HUMANA MEDICARE REPLACEMENT HUMANA MEDICARE REPLACEMENT [id number]     Payor Plan Address Payor Plan Phone Number Payor Plan Fax Number Effective Dates    [address] [address] [phone]  2018 - None Entered    [facility] [id number]       Subscriber Name Subscriber Birth Date Member ID       TIFF AMADOR 1955 [id number]                 Emergency Contacts      (Rel.) Home Phone Work Phone Mobile Phone    Ronald Amador (Son) -- -- [phone]        [place] [address] [facility] [address]  Dept. Phone:  [phone]  Dept. Fax:  [phone] Date Ordered: Mar 4, 2021         Patient:  Tiff Amador MRN:  [id number]   [address] [place] [address] :  1955  SSN:    Phone: [phone] Sex:  F     Weight: 167 kg (368 lb 2.7 oz)         Ht Readings from Last 1 Encounters:   21 172.7 cm (68\")         Rene               (Order ID: " [id number])    Diagnosis:  Sepsis, due to unspecified organism, unspecified whether acute organ dysfunction present (CMS/HCC) (A41.9 [ICD-10-CM] 038.9,995.91 [ICD-9-CM])  Abscess of left thigh (L02.416 [ICD-10-CM] 682.6 [ICD-9-CM])  History of deep vein thrombosis of lower extremity (Z86.718 [ICD-10-CM] V12.51 [ICD-9-CM])  Sepsis due to cellulitis (CMS/HCC) (L03.90,A41.9 [ICD-10-CM] 682.9,995.91 [ICD-9-CM])  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 rheumatoid factor (CMS/HCC) (M05.79 [ICD-10-CM] 714.0 [ICD-9-CM])  Hypoxia (R09.02 [ICD-10-CM] 799.02 [ICD-9-CM])   Quantity:  1     Equipment:  Heavy Duty Walker with Wheels, Patients > 300 lbs  Length of Need (99 Months = Lifetime): 99 Months = Lifetime        Authorizing Provider's Phone: [phone]   Verbal Order Mode: Verbal with readback   Authorizing Provider: Siena Shell DO  Authorizing Provider's NPI: [id number]     Order Entered By: Maegan Love RN 3/4/2021  2:49 PM                    History & Physical      Elaine New DO at 21 0027              [facility] Medicine Services  HISTORY AND PHYSICAL    Patient Name: Raquel Carranza  : 1955  MRN: [id number]  Primary Care Physician: Sommer Paniagua MD  Date of admission: 2021      Subjective   Subjective     Chief Complaint:  Fever and left groin pain    HPI:  Raquel Carranza is a 65 y.o. female with a PMH significant for history of DVT on Xarelto, cardiac arrhythmia, HTN, MAHESH on CPAP, rheumatoid arthritis, Sjogren's disease, CKD who presents to the ED ED due to fever and left groin pain.  Patient reports onset of symptoms 3 days ago when she began to experience a boil in her left groin with associated low-grade fever.  Since that time the boil has gotten larger, more painful and she has began to experience confusion and word finding difficulty.  Patient reports associated fatigue, malaise, headache, nausea and vomiting.  Today she had a  fever of 103 at home and was having difficulty speaking.  Her son was concerned and brought her to the ED for further evaluation.  She denies cough, shortness of breath, change in taste or smell or known exposure to COVID-19.        COVID Details: [] No Symptoms  Symptoms: [x] Fever []  Cough [] Shortness of breath [] Change in taste or smell  Risks:  [] Direct Exposure [] High risk facility   Date of Onset:  ____  Date of first positive COVID test:     Review of Systems   Constitutional: Positive for appetite change, fatigue and fever.   HENT: Negative.    Eyes: Negative.    Respiratory: Negative.    Cardiovascular: Negative.    Gastrointestinal: Positive for nausea and vomiting. Negative for abdominal pain and diarrhea.   Endocrine: Negative.    Genitourinary: Negative.    Musculoskeletal: Negative.    Skin: Positive for color change and rash.   Allergic/Immunologic: Negative.    Neurological: Positive for speech difficulty.   Hematological: Positive for adenopathy.   Psychiatric/Behavioral: Positive for confusion.        All other systems reviewed and are negative.     Personal History     Past Medical History:   Diagnosis Date   • Abnormal glucose    • Acquired hypothyroidism    • Anxiety    • Arrhythmia    • Arthritis    • Bilateral edema of lower extremity    • Chronic joint pain    • Hypertension    • Iron deficiency anemia    • Irritable bowel syndrome    • 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 rheumatoid factor (CMS/HCC)    • Sjogren's disease (CMS/HCC)    • Thrombosis of right saphenous vein    • Vitamin D deficiency        Past Surgical History:   Procedure Laterality Date   • APPENDECTOMY     • HERNIA REPAIR         Family History: family history includes Cancer in her father and mother; Diabetes in her brother, paternal aunt, and paternal uncle; Heart disease in her father; Hypertension in her father; Stroke in her father. Otherwise pertinent FHx was reviewed and unremarkable.      Social History:  reports that she has been smoking cigarettes. She has been smoking about 1.00 pack per day. She has never used smokeless tobacco. She reports that she does not drink alcohol or use drugs.  Social History     Social History Narrative   • Not on file       Medications:  Available home medication information reviewed.  Medications Prior to Admission   Medication Sig Dispense Refill Last Dose   • allopurinol (ZYLOPRIM) 300 MG tablet Take 300 mg by mouth Daily.      • baclofen (LIORESAL) 20 MG tablet Take 20 mg by mouth 3 (Three) Times a Day.      • DULoxetine (CYMBALTA) 30 MG capsule Take 30 mg by mouth daily.      • Ergocalciferol (VITAMIN D2 PO) Take 1.25 mg by mouth 1 (One) Time Per Week.      • famotidine (PEPCID) 20 MG tablet Take 20 mg by mouth 2 (Two) Times a Day.      • ferrous sulfate 325 (65 FE) MG tablet Take 325 mg by mouth Daily With Breakfast.      • gabapentin (NEURONTIN) 300 MG capsule Take 300 mg by mouth 2 (two) times a day.      • levothyroxine (SYNTHROID, LEVOTHROID) 137 MCG tablet TAKE 1 TABLET BY MOUTH DAILY  3    • metoprolol succinate XL (TOPROL-XL) 25 MG 24 hr tablet Take 25 mg by mouth daily.      • oxyCODONE-acetaminophen (PERCOCET) 5-325 MG per tablet 1/2-1 TABLET BY MOUTH EVERY SIX HOURS, AS NEEDED  0    • Rivaroxaban (XARELTO STARTER PACK) 15 & 20 MG tablet therapy pack Take as directed: 15 mg by mouth twice daily for 21 days, then 20 mg once daily. 51 each 0    • spironolactone (ALDACTONE) 50 MG tablet Take 75 mg by mouth Daily.      • betamethasone dipropionate (DIPROLENE) 0.05 % cream Apply  topically 2 (two) times a day.      • clindamycin (CLEOCIN) 300 MG capsule Take 1 capsule by mouth 4 (four) times a day. 28 capsule 0    • clonazePAM (KlonoPIN) 0.5 MG tablet Take 0.5 mg by mouth 2 (two) times a day as needed for seizures.      • folic acid (FOLVITE) 1 MG tablet Take 1 mg by mouth daily.      • mupirocin (BACTROBAN) 2 % ointment Apply 1 application topically 3  (three) times a day.      • ranitidine (ZANTAC) 150 MG tablet Take 150 mg by mouth 2 (two) times a day.          Allergies   Allergen Reactions   • Ace Inhibitors    • Penicillins    • Adacel [Tetanus-Diphth-Acell Pertussis]      Preservative in the vaccine   • Lisinopril    • Zostavax [Zoster Vaccine Live]      Preservative in the vaccine   • Thimerosal Rash       Objective   Objective     Vital Signs:   Temp:  [99.7 °F (37.6 °C)-103 °F (39.4 °C)] 99.7 °F (37.6 °C)  Heart Rate:  [88-92] 88  Resp:  [18-20] 20  BP: (115-134)/(46-63) 115/46  Flow (L/min):  [2] 2  Total (NIH Stroke Scale): 5    Physical Exam   Constitutional: Awake, alert  Eyes: PERRLA, sclerae anicteric, no conjunctival injection  HENT: NCAT, mucous membranes moist  Neck: Supple, no thyromegaly, no lymphadenopathy, trachea midline  Respiratory: Clear to auscultation bilaterally, nonlabored respirations   Cardiovascular: RRR, no murmurs, rubs, or gallops, palpable pedal pulses bilaterally  Gastrointestinal: Positive bowel sounds, soft, nontender, nondistended  Musculoskeletal: Bilateral ankle edema, no clubbing or cyanosis to extremities  Psychiatric: Appropriate affect, cooperative  Neurologic: Oriented x 3, strength symmetric in all extremities, Cranial Nerves grossly intact to confrontation, speech clear  Skin: Edema, swelling and warmth to left groin      Result Review:  I have personally reviewed the results from the time of this admission to 02/20/21 12:27 AM EST and agree with these findings:  [x]  Laboratory  []  Microbiology  [x]  Radiology  []  EKG/Telemetry   []  Cardiology/Vascular   []  Pathology  []  Old records  []  Other:  Most notable findings include: Labs consistent with sepsis      LAB RESULTS:      Lab 02/19/21  1901 02/19/21  1853 02/19/21  1824 02/19/21  1822   WBC  --  17.92*  --   --    HEMOGLOBIN  --  17.3*  --   --    HEMOGLOBIN, POC  --   --   --  18.7*   HEMATOCRIT  --  53.0*  --   --    HEMATOCRIT POC  --   --   --  55*    PLATELETS  --  158  --   --    NEUTROS ABS  --  15.59*  --   --    EOS ABS  --  0.00  --   --    MCV  --  102.5*  --   --    PROCALCITONIN  --  4.46*  --   --    LACTATE 1.8  --   --   --    PROTIME  --   --  28.3*  --    APTT  --  29.9  --   --          Lab 02/19/21  1853 02/19/21  1821   SODIUM 135*  --    POTASSIUM 4.2  --    CHLORIDE 96*  --    CO2 26.0  --    ANION GAP 13.0  --    BUN 18  --    CREATININE 1.22* 1.20   GLUCOSE 122*  --    CALCIUM 9.5  --          Lab 02/19/21  1853   TOTAL PROTEIN 6.7   ALBUMIN 3.40*   GLOBULIN 3.3   ALT (SGPT) 10   AST (SGOT) 15   BILIRUBIN 1.7*   ALK PHOS 94         Lab 02/19/21  1853 02/19/21  1824   TROPONIN T <0.010  --    PROTIME  --  28.3*   INR  --  2.5*                 Lab 02/19/21  1822   PH, ARTERIAL 7.45     Brief Urine Lab Results  (Last result in the past 365 days)      Color   Clarity   Blood   Leuk Est   Nitrite   Protein   CREAT   Urine HCG        02/19/21 1854 Dark Yellow Clear Negative Small (1+) Negative 30 mg/dL (1+)             Microbiology Results (last 10 days)     Procedure Component Value - Date/Time    COVID-19 and FLU A/B PCR - Swab, Nasopharynx [[id number]]  (Normal) Collected: 02/19/21 1911    Lab Status: Final result Specimen: Swab from Nasopharynx Updated: 02/19/21 2143     COVID19 Not Detected     Influenza A PCR Not Detected     Influenza B PCR Not Detected    Narrative:      Fact sheet for providers: https://www.fda.gov/media/694501/download    Fact sheet for patients: https://www.fda.gov/media/855631/download    Test performed by PCR.          Ct Angiogram Neck    Result Date: 2/19/2021  EXAMINATION: CT ANGIOGRAM HEAD-, CT ANGIOGRAM NECK-  INDICATION: code stroke  TECHNIQUE: Axial IV arterial phase contrast-enhanced CT angiogram of the head and neck. Three-dimensional reconstructions were postprocessed.  The radiation dose reduction device was turned on for each scan per the ALARA (As Low as Reasonably Achievable) protocol.  COMPARISON: NONE   FINDINGS: The lung apices are grossly clear evaluation of the neck soft tissues demonstrates no pathologic adenopathy. There is no evidence of acute fracture or aggressive osseous lesion. Patent aortic arch and great vessel origins. The common carotid arteries are medialized bilaterally. On the right, no significant atherosclerotic narrowing, 0% by NASCET criteria. Similar 0% narrowing on the left. The vertebral arteries are grossly normal in course and caliber, with the origins partially obscured by photon starvation due to surrounding soft tissue. Intracranially, the carotid siphons demonstrate no significant atherosclerotic narrowing. Bilateral anterior, middle and posterior cerebral arteries are patent, normal in course and caliber. Prominent bilateral posterior communicating arteries are present. The vertebrobasilar system is unremarkable.      Impression: Essentially normal CT angiogram of the head with no evidence of flow-limiting stenosis, large vessel occlusion or aneurysmal dilatation.   This report was finalized on 2/19/2021 6:58 PM by Miguel Real.      Ct Pelvis Without Contrast    Result Date: 2/19/2021  CT Pelvis WO INDICATION: Sepsis with rash in the left thigh toward the groin TECHNIQUE: CT of the pelvis without IV contrast. Coronal and sagittal reconstructions were obtained.  Radiation dose reduction techniques included automated exposure control or exposure modulation based on body size. Count of known CT and cardiac nuc med studies performed in previous 12 months: 6. COMPARISON: 6/12/2015 FINDINGS: There is subtle soft tissue swelling seen at the left groin extending up toward the obturator foramen with mild edematous change seen in the obturator muscles. There is no evidence of an abscess. Within the pelvis itself there is no evidence of adenopathy or fluid collection. There are mildly prominent lymph nodes in the left inguinal region that are probably reactive. No evidence of bone  destruction. No distended bowel loops.     Impression: Mild reactive lymphadenopathy at the left groin. Mild edematous changes at the left groin extending toward the obturator foramen and involving the obturator muscles to a minimal degree. No evidence of abscess either adjacent to or within the pelvis. Signer Name: Trell Barnhart MD  Signed: 2/19/2021 7:50 PM  Workstation Name: [facility]    Xr Chest 1 View    Result Date: 2/19/2021  CR Chest 1 Vw INDICATION: Acute stroke protocol with sepsis COMPARISON:  None available. FINDINGS: Single portable AP view(s) of the chest.  There is mild cardiomegaly. Both lungs are clear with normal vascular markings. No effusions are seen.     Impression: Cardiomegaly. The lungs are clear. Signer Name: Trell Barnhart MD  Signed: 2/19/2021 7:51 PM  Workstation Name: [facility]    Ct Angiogram Head    Result Date: 2/19/2021  EXAMINATION: CT ANGIOGRAM HEAD-, CT ANGIOGRAM NECK-  INDICATION: code stroke  TECHNIQUE: Axial IV arterial phase contrast-enhanced CT angiogram of the head and neck. Three-dimensional reconstructions were postprocessed.  The radiation dose reduction device was turned on for each scan per the ALARA (As Low as Reasonably Achievable) protocol.  COMPARISON: NONE  FINDINGS: The lung apices are grossly clear evaluation of the neck soft tissues demonstrates no pathologic adenopathy. There is no evidence of acute fracture or aggressive osseous lesion. Patent aortic arch and great vessel origins. The common carotid arteries are medialized bilaterally. On the right, no significant atherosclerotic narrowing, 0% by NASCET criteria. Similar 0% narrowing on the left. The vertebral arteries are grossly normal in course and caliber, with the origins partially obscured by photon starvation due to surrounding soft tissue. Intracranially, the carotid siphons demonstrate no significant atherosclerotic  narrowing. Bilateral anterior, middle and posterior cerebral arteries are patent, normal in course and caliber. Prominent bilateral posterior communicating arteries are present. The vertebrobasilar system is unremarkable.      Impression: Essentially normal CT angiogram of the head with no evidence of flow-limiting stenosis, large vessel occlusion or aneurysmal dilatation.   This report was finalized on 2/19/2021 6:58 PM by Miguel Real.      Ct Head Without Contrast Stroke Protocol    Result Date: 2/19/2021  EXAMINATION: CT HEAD WO CONTRAST STROKE PROTOCOL-  INDICATION: Stroke, follow up  TECHNIQUE: Axial noncontrast CT of the head with multiplanar reconstruction  The radiation dose reduction device was turned on for each scan per the ALARA (As Low as Reasonably Achievable) protocol.  COMPARISON: NONE  FINDINGS: Gray-white differentiation is maintained and there is no evidence of intracranial hemorrhage, mass or mass effect. Age-related changes of the brain are present including volume loss and typical periventricular sequela of chronic small vessel ischemia. There is otherwise no evidence of intracranial hemorrhage, mass or mass effect. The ventricles are normal in size and configuration accounting for surrounding volume loss. The orbits are normal and the paranasal sinuses are grossly clear.      Impression: Age-related changes of the brain as above, otherwise without evidence of acute intracranial abnormality.  Scan performed at 6:08 PM 2/19/2021 results related to the stroke team via the CT technologist at 6:17 PM same day  This report was finalized on 2/19/2021 6:31 PM by Miguel Real.      Ct Lower Extremity Left Without Contrast    Result Date: 2/19/2021  CT LE LT WO INDICATION:  Pain and swelling to the left thigh with sepsis TECHNIQUE: CT of the left thigh without IV contrast. Coronal and sagittal reconstructions were obtained.  Radiation dose reduction techniques included automated exposure  control or exposure modulation based on body size. Count of known CT and cardiac nuc med studies performed in previous 12 months: 5.  COMPARISON:  None available. FINDINGS: There is soft tissue swelling with induration of subcutaneous fat along the medial aspect of the left upper thigh extending up toward the groin. Mild edematous changes extend up as high as the obturator muscles at the low pelvis. There is no evidence of a discrete abscess. There are mildly prominent inguinal lymph nodes on the left that are likely reactive. No deep inflammatory changes are seen. No destructive bone lesions are seen in the femur or pelvis.     Impression: Inflammatory changes are seen consistent with cellulitis involving the upper medial thigh and extending up toward the obturator muscles at the low pelvis. No abscess is identified. Signer Name: Trell Barnhart MD  Signed: 2/19/2021 7:42 PM  Workstation Name: [email or web address]    Ct Cerebral Perfusion With & Without Contrast    Result Date: 2/19/2021  EXAMINATION: CT CEREBRAL PERFUSION W WO CONTRAST-  INDICATION: code stroke  TECHNIQUE: Cerebral perfusion analysis was performed using computed tomography with contrast administration, including post processing of parametric maps with determination of cerebral blood flow, cerebral blood volume, and mean transit time.  The radiation dose reduction device was turned on for each scan per the ALARA (As Low as Reasonably Achievable) protocol.  COMPARISON: Noncontrast CT head performed concurrently  FINDINGS: Perfusion maps demonstrate relative symmetry involving cerebral blood flow and cerebral blood volume, without definite evidence of core infarct or large area of ischemic tissue at risk.      Impression: Relatively symmetric perfusion with no evidence of core infarct or ischemic tissue at risk  This report was finalized on 2/19/2021 6:53 PM by Miguel Real.             Assessment/Plan   Assessment &  Plan     Active Hospital Problems    Diagnosis POA   • **Sepsis due to cellulitis (CMS/HCC) [L03.90, A41.9] Yes   • RAFA (acute kidney injury) (CMS/HCC) [N17.9] Unknown   • Arrhythmia [I49.9] Unknown   • Hypertension [I10] Unknown   • 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 rheumatoid factor (CMS/HCC) [M05.79] Unknown   • Sjogren's disease (CMS/HCC) [M35.00] Unknown   • Acquired hypothyroidism [E03.9] Unknown   • CKD (chronic kidney disease) [N18.9] Unknown   • Altered mental status [R41.82] Unknown   • Hypoxia [R09.02] Unknown   • Word finding difficulty [R47.89] Unknown   • History of deep vein thrombosis of lower extremity [Z86.718] Not Applicable   This is a 65-year-old female patient with a PMH significant for arrhythmia, CKD, HTN, RA, Sjogren's disease, hypothyroidism who presents to the ED due to fever and left groin pain found to meet sepsis criteria.    Sepsis  Cellulitis  Altered mental status  --Tachycardic, leukocytosis, elevated procalcitonin, source of infection  --blood cultures  --suspected source is cellulitis to left groin.  No obvious abscess on CT scan, consider general surgery consult based on response to antibiotic therapy  -Consider ID consult  --broad spectrum abx coverage with vancomycin and meropenem (severe penicillin allergy)  --IVFs    RAFA versus CKD  -Unsure of baseline, patient reports history of CKD.  Request outside records  -Hold nephrotoxic medications  -IVF  -A.m. labs    Word finding difficulty  -Likely secondary to acute illness with sepsis  -CTA head and neck, CT perfusion without acute findings  -We will get MRI  -Echocardiogram  -FLP, A1c for a.m.  -Aspirin, atorvastatin  -Neurochecks  -Neurology consult    Hypoxia  MAHESH  -PE unlikely, on home Xarelto  -Possible obesity hypoventilation syndrome/MAHESH  -Echo for a.m.  -CPAP    History of DVT  -Heparin drip  -Hold home Xarelto in case patient needs to go to the OR for developing abscess    Arrhythmia  -Data  deficit  -Request outside records    Sjogren's disease  Rheumatoid arthritis  -No active immunosuppressive medication    Hypothyroidism  Hypertension  -Continue home meds    Home med list reviewed      DVT prophylaxis: Heparin drip      CODE STATUS: Full code  Code Status and Medical Interventions:   Ordered at: 21     Code Status:    CPR     Medical Interventions (Level of Support Prior to Arrest):    Full        Admission Status:  I believe this patient meets INPATIENT status due to sepsis.  I feel patient’s risk for adverse outcomes and need for care warrant INPATIENT evaluation and I predict the patient’s care encounter to likely last beyond 2 midnights.    Elaine New DO  21      Electronically signed by Elaine New DO at 21 0059          Physical Therapy Notes (most recent note)      Donya Foreman PT at 21 1147  Version 1 of 1         Patient Name: Raquel Carranza  : 1955    MRN: [id number]                              Today's Date: 3/3/2021       Admit Date: 2021    Visit Dx:     ICD-10-CM ICD-9-CM   1. Sepsis, due to unspecified organism, unspecified whether acute organ dysfunction present (CMS/[facility])  A41.9 038.9     995.91   2. Left leg cellulitis  L03.116 682.6   3. Abscess of left thigh  L02.416 682.6     Patient Active Problem List   Diagnosis   • History of deep vein thrombosis of lower extremity   • Special screening for malignant neoplasms, colon   • Sepsis due to cellulitis (CMS/[facility])   • RAFA (acute kidney injury) (CMS/[facility])   • Arrhythmia   • Hypertension   • 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 rheumatoid factor (CMS/HCC)   • Sjogren's disease (CMS/[facility])   • Acquired hypothyroidism   • CKD (chronic kidney disease)   • Altered mental status   • Hypoxia   • Word finding difficulty     Past Medical History:   Diagnosis Date   • Abnormal glucose    • Acquired hypothyroidism    • Anxiety    • Arrhythmia    • Arthritis    •  Bilateral edema of lower extremity    • Chronic joint pain    • Hypertension    • Iron deficiency anemia    • Irritable bowel syndrome    • 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 rheumatoid factor (CMS/HCC)    • Sjogren's disease (CMS/HCC)    • Thrombosis of right saphenous vein    • Vitamin D deficiency      Past Surgical History:   Procedure Laterality Date   • APPENDECTOMY     • HERNIA REPAIR     • LEG EXCISION LESION/CYST Left 2/26/2021    Procedure: INCISION AND DRAINAGE LEFT THIGH ABCESS;  Surgeon: Noe Ken MD;  Location: [facility] OR;  Service: General;  Laterality: Left;   • LEG EXCISION LESION/CYST Left 2/27/2021    Procedure: WOUND EXPLORATION LEFT THIGH FOR CONTROL OF BLEEDING;  Surgeon: Sreedhar Bobby MD;  Location: [facility] OR;  Service: General;  Laterality: Left;     General Information     Row Name 03/03/21 1420          Physical Therapy Time and Intention    Document Type  therapy note (daily note)  -EJ     Mode of Treatment  physical therapy  -     Row Name 03/03/21 1420          General Information    Patient Profile Reviewed  yes  -EJ     Existing Precautions/Restrictions  fall;oxygen therapy device and L/min;other (see comments) Now s/p I&D 2/26 with 2nd exploratory sx for bleeding2/27 POD 2.  -EJ     Barriers to Rehab  medically complex  -EJ     Row Name 03/03/21 1420          Living Environment    Lives With  alone  -EJ     Row Name 03/03/21 1420          Cognition    Orientation Status (Cognition)  oriented x 4  -EJ     Row Name 03/03/21 1420          Safety Issues, Functional Mobility    Safety Issues Affecting Function (Mobility)  awareness of need for assistance;safety precautions follow-through/compliance;safety precaution awareness  -EJ     Impairments Affecting Function (Mobility)  endurance/activity tolerance;pain;range of motion (ROM);shortness of breath;strength  -EJ       User Key  (r) = Recorded By, (t) = Taken By, (c) = Cosigned By    Initials  "Name Provider Type    Donya Marcelino PT Physical Therapist        Mobility     Row Name 03/03/21 1428          Bed Mobility    Comment (Bed Mobility)  pt [facility] and [place] [facility]  -EJ     Row Name 03/03/21 1428          Bed-Chair Transfer    Bed-Chair [place] (Transfers)  standby assist;verbal cues  -EJ     Assistive Device (Bed-Chair Transfers)  walker, front-wheeled;bariatric  -EJ     Row Name 03/03/21 1428          Sit-Stand Transfer    Sit-Stand [place] (Transfers)  standby assist;verbal cues  -EJ     Assistive Device (Sit-Stand Transfers)  walker, front-wheeled;bariatric  -EJ     Row Name 03/03/21 1428          Gait/Stairs (Locomotion)    [place] Level (Gait)  contact guard  -WAQAR     Assistive Device (Gait)  walker, front-wheeled  -EJ     Distance in Feet (Gait)  90  -EJ     Deviations/Abnormal Patterns (Gait)  bilateral deviations;juvenal decreased;gait speed decreased;weight shifting decreased  -EJ     Bilateral Gait Deviations  forward flexed posture;heel strike decreased  -EJ     Comment (Gait/Stairs)  Wide base. 6L on portable O2 tank (did not have 5th L setting). Pt reports \"woosiness\" throghout 2/2 medication.  -EJ       User Key  (r) = Recorded By, (t) = Taken By, (c) = Cosigned By    Initials Name Provider Type    Donya Marcelino PT Physical Therapist        Obj/Interventions     Row Name 03/03/21 1435          Motor Skills    Therapeutic Exercise  -- deferred. Pt wanted to get [place] [facility]  -EJ       User Key  (r) = Recorded By, (t) = Taken By, (c) = Cosigned By    Initials Name Provider Type    Donya Marcelino PT Physical Therapist        Goals/Plan    No documentation.       Clinical Impression     Row Name 03/03/21 1436          Pain    Additional Documentation  Pain Scale: FACES Pre/Post-Treatment (Group)  -WAQAR     Row Name 03/03/21 1436          Pain Scale: Numbers Pre/Post-Treatment    Pretreatment Pain Rating  0/10 - no pain  -EJ     Posttreatment Pain Rating  0/10 " - no pain  -EJ     Pain Location - Side  Left  -EJ     Pain Location - Orientation  generalized  -EJ     Pain Intervention(s)  Repositioned;Ambulation/increased activity  -EJ     Row Name 03/03/21 1436          Pain Scale: FACES Pre/Post-Treatment    Pain: FACES Scale, Pretreatment  0-->no hurt  -EJ     Posttreatment Pain Rating  2-->hurts little bit  -EJ     Pain Location - Side  Left  -EJ     Pain Location - Orientation  incisional  -EJ     Pain Location  extremity  -EJ     Row Name 03/03/21 1436          Plan of Care Review    Plan of Care Reviewed With  patient  -EJ     Progress  improving  -EJ     Outcome Summary  Pt ambulates 90 ft in ross with RWx, CGA, on 5-6L per NC throughout. Upon return to room pt sat on BSC. RN notified.  -EJ     Row Name 03/03/21 1436          Vital Signs    Pre SpO2 (%)  93  -EJ     O2 Delivery Pre Treatment  supplemental O2  -EJ     O2 Delivery Intra Treatment  supplemental O2  -EJ     O2 Delivery Post Treatment  supplemental O2  -EJ     Pre Patient Position  Sitting  -EJ     Intra Patient Position  Standing  -EJ     Post Patient Position  Sitting  -EJ     Row Name 03/03/21 1436          Positioning and Restraints    Pre-Treatment Position  sitting in chair/recliner  -EJ     Post Treatment Position  chair  -EJ     In Chair  notified nsg;sitting;call light within reach;encouraged to call for assist;exit alarm on;waffle cushion  -EJ       User Key  (r) = Recorded By, (t) = Taken By, (c) = Cosigned By    Initials Name Provider Type    EJ Donya Foreman, PT Physical Therapist        Outcome Measures     Row Name 03/03/21 1440          How much help from another person do you currently need...    Turning from your back to your side while in flat bed without using bedrails?  3  -EJ     Moving from lying on back to sitting on the side of a flat bed without bedrails?  3  -EJ     Moving to and from a bed to a chair (including a wheelchair)?  3  -EJ     Standing up from a chair using your  arms (e.g., wheelchair, bedside chair)?  3  -EJ     Climbing 3-5 steps with a railing?  2  -EJ     To walk in hospital room?  3  -EJ     AM-PAC 6 Clicks Score (PT)  17  -EJ     Row Name 03/03/21 1440          Functional Assessment    Outcome Measure Options  AM-PAC 6 Clicks Basic Mobility (PT)  -EJ       User Key  (r) = Recorded By, (t) = Taken By, (c) = Cosigned By    Initials Name Provider Type    EJ Donya Foreman, PT Physical Therapist        Physical Therapy Education                 Title: PT OT SLP Therapies (In Progress)     Topic: Physical Therapy (In Progress)     Point: Mobility training (In Progress)     Learning Progress Summary           Patient Acceptance, E, NR by EJ at 3/3/2021 1442    Acceptance, E, NR by EJ at 3/1/2021 1521    Acceptance, E, NR by EJ at 2/26/2021 1107    Acceptance, E, NR by AS at 2/23/2021 1129    Acceptance, E, NR by EJ at 2/22/2021 1648    Acceptance, E, NR by LM at 2/20/2021 1038                   Point: Home exercise program (In Progress)     Learning Progress Summary           Patient Acceptance, E, NR by EJ at 3/3/2021 1442    Acceptance, E, NR by EJ at 3/1/2021 1521    Acceptance, E, NR by EJ at 2/26/2021 1107    Acceptance, E, NR by AS at 2/23/2021 1129    Acceptance, E, NR by EJ at 2/22/2021 1648                   Point: Precautions (In Progress)     Learning Progress Summary           Patient Acceptance, E, NR by EJ at 3/3/2021 1442    Acceptance, E, NR by EJ at 3/1/2021 1521    Acceptance, E, NR by EJ at 2/26/2021 1107    Acceptance, E, NR by AS at 2/23/2021 1129    Acceptance, E, NR by EJ at 2/22/2021 1648    Acceptance, E, NR by LM at 2/20/2021 1038                               User Key     Initials Effective Dates Name Provider Type Discipline     11/20/18 -  Donya Foreman, PT Physical Therapist PT    AS 06/22/15 -  Araceli Saravia, PTA Physical Therapy Assistant PT    LM 07/24/19 -  McGarrh, Megan, PT Physical Therapist PT              PT  Recommendation and Plan     Plan of Care Reviewed With: patient  Progress: improving  Outcome Summary: Pt ambulates 90 ft in ross with RWx, CGA, on 5-6L per NC throughout. Upon return to room pt sat on BSC. RN notified.     Time Calculation:   PT Charges     Row Name 21 1442             Time Calculation    Start Time  1147  -EJ      PT Received On  21  -EJ      PT Goal Re-Cert Due Date  21  -EJ         Time Calculation- PT    Total Timed Code Minutes- PT  21 minute(s)  -EJ         Timed Charges    62179 - PT Therapeutic Activity Minutes  21  -EJ        User Key  (r) = Recorded By, (t) = Taken By, (c) = Cosigned By    Initials Name Provider Type    EJ Donya Foreman, PT Physical Therapist        Therapy Charges for Today     Code Description Service Date Service Provider Modifiers Qty    84221726706  PT THERAPEUTIC ACT EA 15 MIN 3/3/2021 Donya Foreman, PT GP 1          PT G-Codes  Outcome Measure Options: AM-PAC 6 Clicks Basic Mobility (PT)  AM-PAC 6 Clicks Score (PT): 17  AM-PAC 6 Clicks Score (OT): 19    Donya Trujillo, PT  3/3/2021      Electronically signed by Donya Foreman PT at 21 1444          Occupational Therapy Notes (most recent note)      Faustina Pérez, OT at 21 1313          Patient Name: Raquel Carranza  : 1955    MRN: [id number]                              Today's Date: 3/4/2021       Admit Date: 2021    Visit Dx:     ICD-10-CM ICD-9-CM   1. Sepsis, due to unspecified organism, unspecified whether acute organ dysfunction present (CMS/HCC)  A41.9 038.9     995.91   2. Left leg cellulitis  L03.116 682.6   3. Abscess of left thigh  L02.416 682.6     Patient Active Problem List   Diagnosis   • History of deep vein thrombosis of lower extremity   • Special screening for malignant neoplasms, colon   • Sepsis due to cellulitis (CMS/HCC)   • RAFA (acute kidney injury) (CMS/HCC)   • Arrhythmia   • Hypertension   • Rheumatoid arthritis  involving multiple sites with positive rheumatoid factor (CMS/HCC)   • Sjogren's disease (CMS/HCC)   • Acquired hypothyroidism   • CKD (chronic kidney disease)   • Altered mental status   • Hypoxia   • Word finding difficulty     Past Medical History:   Diagnosis Date   • Abnormal glucose    • Acquired hypothyroidism    • Anxiety    • Arrhythmia    • Arthritis    • Bilateral edema of lower extremity    • Chronic joint pain    • Hypertension    • Iron deficiency anemia    • Irritable bowel syndrome    • 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 rheumatoid factor (CMS/HCC)    • Sjogren's disease (CMS/HCC)    • Thrombosis of right saphenous vein    • Vitamin D deficiency      Past Surgical History:   Procedure Laterality Date   • APPENDECTOMY     • HERNIA REPAIR     • LEG EXCISION LESION/CYST Left 2/26/2021    Procedure: INCISION AND DRAINAGE LEFT THIGH ABCESS;  Surgeon: Noe Ken MD;  Location: [facility];  Service: General;  Laterality: Left;   • LEG EXCISION LESION/CYST Left 2/27/2021    Procedure: WOUND EXPLORATION LEFT THIGH FOR CONTROL OF BLEEDING;  Surgeon: Sreedhar Bobby MD;  [facility];  Service: General;  Laterality: Left;     General Information     Row Name 03/04/21 [facility]9          OT Time and Intention    Document Type  therapy note (daily note)  -KF     Mode of Treatment  occupational therapy  -KF     Row Name 03/04/21 1353          General Information    Patient Profile Reviewed  yes  -KF     Existing Precautions/Restrictions  fall;oxygen therapy device and L/min;other (see comments) L thigh incision  -KF     Barriers to Rehab  medically complex  -KF     Row Name 03/04/21 1355          Cognition    Orientation Status (Cognition)  oriented x 4  -KF     Row Name 03/04/21 1356          Safety Issues, Functional Mobility    Safety Issues Affecting Function (Mobility)  insight into deficits/self-awareness;awareness of need for assistance;safety precaution awareness   -KF     Impairments Affecting Function (Mobility)  endurance/activity tolerance;pain;range of motion (ROM);shortness of breath;strength  -KF       User Key  (r) = Recorded By, (t) = Taken By, (c) = Cosigned By    Initials Name Provider Type    KF Faustina Pérez, OT Occupational Therapist          Mobility/ADL's     Row Name 03/04/21 1354          Bed Mobility    Bed Mobility  scooting/bridging;supine-sit  -KF     Scooting/Bridging [place] (Bed Mobility)  modified independence  -KF     Supine-Sit [place] (Bed Mobility)  modified independence  -KF     Bed Mobility, Safety Issues  other (see comments) none  -KF     Assistive Device (Bed Mobility)  bed rails;head of bed elevated  -KF     Comment (Bed Mobility)  mod I throughout bed mob  -KF     Row Name 03/04/21 1354          Transfers    Transfers  sit-stand transfer;bed-chair transfer;toilet transfer  -KF     Comment (Transfers)  BSC across room then to chair post ADLs standing sink side, progressing activity tolerance good safety with bariatric FWW  -KF     Bed-Chair [place] (Transfers)  standby assist;verbal cues  -KF     Assistive Device (Bed-Chair Transfers)  walker, front-wheeled;bariatric  -KF     Sit-Stand [place] (Transfers)  standby assist;verbal cues  -KF     [place] Level (Toilet Transfer)  standby assist;verbal cues  -KF     Assistive Device (Toilet Transfer)  walker, front-wheeled;bariatric equipment;commode, bedside without drop arms  -KF     Row Name 03/04/21 1354          Sit-Stand Transfer    Assistive Device (Sit-Stand Transfers)  walker, front-wheeled;bariatric  -KF     Row Name 03/04/21 1354          Toilet Transfer    Type (Toilet Transfer)  stand pivot/stand step  -KF     Row Name 03/04/21 1354          Functional Mobility    Functional Mobility- Ind. Level  contact guard assist  -KF     Functional Mobility- Device  rolling walker  -KF     Functional Mobility-Distance (Feet)  14  -KF     Functional Mobility- Safety  Issues  supplemental O2;step length decreased  -KF     Functional Mobility- Comment  steps within room too fatigued post toileting and standing ADLs at sink side to progress functional mob at this time to further distance  -KF     Row Name 03/04/21 1351          Activities of Daily Living    BADL Assessment/Intervention  upper body dressing;toileting;grooming  -KF     Row Name 03/04/21 1350          Upper Body Dressing Assessment/Training    [place] Level (Upper Body Dressing)  don;front opening garment;set up  -KF     Position (Upper Body Dressing)  edge of bed sitting  -KF     Row Name 03/04/21 1357          Grooming Assessment/Training    [place] Level (Grooming)  wash face, hands;oral care regimen;standby assist  -KF     Position (Grooming)  supported standing;sink side  -KF     Comment (Grooming)  progressed to standing for oral hygiene cues  -KF     Row Name 03/04/21 0606          Toileting Assessment/Training    [place] Level (Toileting)  adjust/manage clothing;perform perineal hygiene;maximum assist (25% patient effort);contact guard assist  -KF     Position (Toileting)  supported standing;supported sitting  -KF     Comment (Toileting)  progressed to CGA for clothing management  -KF       User Key  (r) = Recorded By, (t) = Taken By, (c) = Cosigned By    Initials Name Provider Type    KF Faustnia Pérez, OT Occupational Therapist        Obj/Interventions     Row Name 03/04/21 2095          Balance    Balance Assessment  sitting static balance;sitting dynamic balance;standing static balance;standing dynamic balance  -KF     Static Sitting Balance  WNL  -KF     Dynamic Sitting Balance  WNL  -KF     Static Standing Balance  WFL;supported;standing  -KF     Dynamic Standing Balance  mild impairment;supported;standing  -KF     Balance Interventions  sitting;standing;occupation based/functional task;weight shifting activity;UE activity with balance activity  -KF       User Key  (r) = Recorded By,  (t) = Taken By, (c) = Cosigned By    Initials Name Provider Type    KF Faustina Pérez, OT Occupational Therapist        Goals/Plan    No documentation.       Clinical Impression     Row Name 03/04/21 1400          Pain Assessment    Additional Documentation  Pain Scale: Numbers Pre/Post-Treatment (Group)  -KF     Row Name 03/04/21 1400          Pain Scale: Numbers Pre/Post-Treatment    Pretreatment Pain Rating  0/10 - no pain  -KF     Posttreatment Pain Rating  0/10 - no pain  -KF     Pain Location - Orientation  generalized  -KF     Pre/Posttreatment Pain Comment  tolerated  -KF     Pain Intervention(s)  Ambulation/increased activity;Repositioned  -KF     Row Name 03/04/21 1400          Plan of Care Review    Plan of Care Reviewed With  patient  -     Progress  improving  -     Outcome Summary  Pt completed bed mob [place], SBA STS at [facility] x4, 14 ft functional mob CGA at [facility], completed toileting CGA clothing management max A toilet hygiene with methods at home to allow for modified independence, tolerated standing ADL tasks sink side o2 above 90% throughout on 4L, cont IPOT per POC  -     Row Name 03/04/21 1400          Therapy Assessment/Plan (OT)    Rehab Potential (OT)  good, to achieve stated therapy goals  -     Criteria for Skilled Therapeutic Interventions Met (OT)  yes;meets criteria;skilled treatment is necessary  -KF     Therapy Frequency (OT)  daily  -     Row Name 03/04/21 1400          Therapy Plan Review/Discharge Plan (OT)    Anticipated Discharge Disposition (OT)  home with assist;home with home health  -     Row Name 03/04/21 1400          Vital Signs    Pre Systolic BP Rehab  95 no dizziness throughout  -KF     Pre Treatment Diastolic BP  63  -KF     Pre SpO2 (%)  93  -KF     O2 Delivery Pre Treatment  supplemental O2 4L  -KF     Intra SpO2 (%)  90  -KF     O2 Delivery Intra Treatment  supplemental O2  -KF     Post SpO2 (%)  93  -KF     O2 Delivery Post Treatment  supplemental O2  -KF      Pre Patient Position  Supine  -KF     Intra Patient Position  Standing  -KF     Post Patient Position  Sitting  -KF     Rest Breaks   1  -KF     Row Name 03/04/21 1400          Positioning and Restraints    Pre-Treatment Position  in bed  -KF     Post Treatment Position  chair  -KF     In Chair  notified nsg;sitting;call light within reach;encouraged to call for assist;waffle cushion  -KF       User Key  (r) = Recorded By, (t) = Taken By, (c) = Cosigned By    Initials Name Provider Type    Faustina Garcia OT Occupational Therapist        Outcome Measures     Row Name 03/04/21 1407          How much help from another is currently needed...    Putting on and taking off regular lower body clothing?  3  -KF     Bathing (including washing, rinsing, and drying)  3  -KF     Toileting (which includes using toilet bed pan or urinal)  3  -KF     Putting on and taking off regular upper body clothing  4  -KF     Taking care of personal grooming (such as brushing teeth)  3  -KF     Eating meals  4  -KF     AM-PAC 6 Clicks Score (OT)  20  -KF     Row Name 03/04/21 1407          Functional Assessment    Outcome Measure Options  AM-PAC 6 Clicks Daily Activity (OT)  -KF       User Key  (r) = Recorded By, (t) = Taken By, (c) = Cosigned By    Initials Name Provider Type    Faustina Garcia OT Occupational Therapist        Occupational Therapy Education                 Title: PT OT SLP Therapies (In Progress)     Topic: Occupational Therapy (Done)     Point: ADL training (Done)     Description:   Instruct learner(s) on proper safety adaptation and remediation techniques during self care or transfers.   Instruct in proper use of assistive devices.              Learning Progress Summary           Patient Acceptance, E,TB,D, VU,DU by  at 3/4/2021 1408    Acceptance, E,TB,D, VU,DU by KF at 3/3/2021 1156    Acceptance, E,TB,D, VU,DU by KF at 3/2/2021 1320    Acceptance, E,TB,D, VU,DU by  at 2/26/2021 0942    Acceptance,  E, VU,NR by RADHA at 2/23/2021 0949    Acceptance, E,TB,D, VU,DU by KF at 2/20/2021 1514                   Point: Home exercise program (Done)     Description:   Instruct learner(s) on appropriate technique for monitoring, assisting and/or progressing therapeutic exercises/activities.              Learning Progress Summary           Patient Acceptance, E,TB,D, VU,DU by KF at 3/4/2021 1408    Acceptance, E,TB,D, VU,DU by KF at 3/3/2021 1156    Acceptance, E,TB,D, VU,DU by KF at 3/2/2021 1320    Acceptance, E,TB,D, VU,DU by KF at 2/26/2021 0942    Acceptance, E,TB,D, VU,DU by  at 2/20/2021 1514                   Point: Precautions (Done)     Description:   Instruct learner(s) on prescribed precautions during self-care and functional transfers.              Learning Progress Summary           Patient Acceptance, E,TB,D, VU,DU by KF at 3/4/2021 1408    Acceptance, E,TB,D, VU,DU by KF at 3/3/2021 1156    Acceptance, E,TB,D, VU,DU by KF at 3/2/2021 1320    Acceptance, E,TB,D, VU,DU by  at 2/26/2021 0942    Acceptance, E, VU,NR by  at 2/23/2021 0949    Acceptance, E,TB,D, VU,DU by  at 2/20/2021 1514                   Point: Body mechanics (Done)     Description:   Instruct learner(s) on proper positioning and spine alignment during self-care, functional mobility activities and/or exercises.              Learning Progress Summary           Patient Acceptance, E,TB,D, VU,DU by KF at 3/4/2021 1408    Acceptance, E,TB,D, VU,DU by KF at 3/3/2021 1156    Acceptance, E,TB,D, VU,DU by  at 3/2/2021 1320    Acceptance, E,TB,D, VU,DU by KF at 2/26/2021 0942    Acceptance, E, VU,NR by  at 2/23/2021 0949    Acceptance, E,TB,D, VU,DU by  at 2/20/2021 1514                               User Key     Initials Effective Dates Name Provider Type Discipline    KF 04/03/18 -  Faustina Pérez, OT Occupational Therapist OT    LC 07/18/19 -  Tiny Casillas, OT Occupational Therapist OT              OT Recommendation and  Plan  Planned Therapy Interventions (OT): activity tolerance training, functional balance retraining, occupation/activity based interventions, ROM/therapeutic exercise, strengthening exercise, transfer/mobility retraining, BADL retraining, patient/caregiver education/training  Therapy Frequency (OT): daily  Plan of Care Review  Plan of Care Reviewed With: patient  Progress: improving  Outcome Summary: Pt completed bed mob [place], SBA STS at [facility] x4, 14 ft functional mob CGA at [facility], completed toileting CGA clothing management max A toilet hygiene with methods at home to allow for modified independence, tolerated standing ADL tasks sink side o2 above 90% throughout on 4L, cont IPOT per POC     Time Calculation:   Time Calculation- OT     Row Name 03/04/21 1313             Time Calculation- OT    OT Start Time  1313  -KF      OT Received On  03/04/21  -KF         Timed Charges    45050 - OT Therapeutic Activity Minutes  18  -KF      41896 - OT Self Care/Mgmt Minutes  20  -KF        User Key  (r) = Recorded By, (t) = Taken By, (c) = Cosigned By    Initials Name Provider Type    KF Faustina Pérez OT Occupational Therapist        Therapy Charges for Today     Code Description Service Date Service Provider Modifiers Qty    19547104805 HC OT SELF CARE/MGMT/TRAIN EA 15 MIN 3/3/2021 Faustina Pérez OT GO 1    48645036787 HC OT SELF CARE/MGMT/TRAIN EA 15 MIN 3/4/2021 Faustina Pérez OT GO 2    55300341729 HC OT THERAPEUTIC ACT EA 15 MIN 3/4/2021 Faustina Pérez OT GO 1               Faustina Préez OT  3/4/2021    Electronically signed by Faustina Pérez OT at 03/04/21 9855

## 2021-03-04 NOTE — PROGRESS NOTES
Continued Stay Note  [facility]     Patient Name: Raquel Carranza  MRN: [id number]  Today's Date: 3/4/2021    Admit Date: 2/19/2021    Discharge Plan     Row Name 03/04/21 1540       Plan    Plan  Home with [facility]    Patient/Family in Agreement with Plan  yes    Plan Comments  Spoke with patient at bedside.  She declines to go to rehab.  She requested to go home with  and a walker.  Bariatric walker ordered and arranged with [facility].  Home health ordered and arranged with [facility].  Updated April with [facility]. Patient's son to transport.    Final Discharge Disposition Code  06 - home with home health care        Discharge Codes    No documentation.       Expected Discharge Date and Time     Expected Discharge Date Expected Discharge Time    Mar 5, 2021             Maegan Love RN

## 2021-03-04 NOTE — THERAPY RE-EVALUATION
Acute Care - Wound/Debridement Initial Evaluation  [facility]     Patient Name: Raquel Carranza  : 1955  MRN: [id number]  Today's Date: 3/4/2021                Admit Date: 2021    Visit Dx:    ICD-10-CM ICD-9-CM   1. Sepsis, due to unspecified organism, unspecified whether acute organ dysfunction present (CMS/HCC)  A41.9 038.9     995.91   2. Left leg cellulitis  L03.116 682.6   3. Abscess of left thigh  L02.416 682.6   4. History of deep vein thrombosis of lower extremity  Z86.718 V12.51   5. Sepsis due to cellulitis (CMS/HCC)  L03.90 682.9    A41.9 995.91   6. 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 rheumatoid factor (CMS/HCC)  M05.79 714.0   7. Hypoxia  R09.02 799.02       Patient Active Problem List   Diagnosis   • History of deep vein thrombosis of lower extremity   • Special screening for malignant neoplasms, colon   • Sepsis due to cellulitis (CMS/HCC)   • RAFA (acute kidney injury) (CMS/HCC)   • Arrhythmia   • Hypertension   • 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 rheumatoid factor (CMS/HCC)   • Sjogren's disease (CMS/HCC)   • Acquired hypothyroidism   • CKD (chronic kidney disease)   • Altered mental status   • Hypoxia   • Word finding difficulty        Past Medical History:   Diagnosis Date   • Abnormal glucose    • Acquired hypothyroidism    • Anxiety    • Arrhythmia    • Arthritis    • Bilateral edema of lower extremity    • Chronic joint pain    • Hypertension    • Iron deficiency anemia    • Irritable bowel syndrome    • 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 rheumatoid factor (CMS/HCC)    • Sjogren's disease (CMS/HCC)    • Thrombosis of right saphenous vein    • Vitamin D deficiency         Past Surgical History:   Procedure Laterality Date   • APPENDECTOMY     • HERNIA REPAIR     • LEG EXCISION LESION/CYST Left 2021    Procedure: INCISION AND DRAINAGE LEFT THIGH ABCESS;  Surgeon: Noe Ken MD;  Location:   [facility];  Service: General;  Laterality: Left;   • LEG EXCISION LESION/CYST Left 2/27/2021    Procedure: WOUND EXPLORATION LEFT THIGH FOR CONTROL OF BLEEDING;  Surgeon: Sreedhar Bobby MD;  Location:  [facility];  Service: General;  Laterality: Left;           Wound 02/26/21 Left anterior thigh Incision (Active)   Wound Image   03/04/21 1345   Dressing Appearance intact;moist drainage 03/04/21 1345   Closure KARLY 03/04/21 0937   Base moist;pink;red;slough;yellow 03/04/21 1345   Periwound intact;blanchable 03/04/21 1345   Periwound Temperature warm 03/04/21 1345   Periwound Skin Turgor firm 03/04/21 1345   Edges irregular 03/04/21 1345   Wound Length (cm) 14.3 cm 03/04/21 1345   Wound Width (cm) 3 cm 03/04/21 1345   Wound Depth (cm) 2.5 cm 03/04/21 1345   Undermining [Depth (cm)/Location] 6cm from 9:00-11:00 03/04/21 1345   Drainage Characteristics/Odor serosanguineous 03/04/21 1345   Drainage Amount moderate 03/04/21 1345   Care, Wound irrigated with;sterile normal saline;negative pressure wound therapy 03/04/21 1345   Dressing Care dressing changed 03/04/21 1345   Periwound Care dry periwound area maintained 03/04/21 0400   Wound Output (mL) 0 03/04/21 1345       NPWT (Negative Pressure Wound Therapy) 03/04/21 1345 L thigh (Active)   Therapy Setting continuous therapy 03/04/21 1345   Dressing foam, black;foam, white 03/04/21 1345   Pressure Setting 150 mmHg 03/04/21 1345   Sponges Inserted 2 03/04/21 1345   Sponges Removed other (see comments) 03/04/21 1345   Finger sweep complete Yes 03/04/21 1345         WOUND DEBRIDEMENT                        PT Assessment (last 12 hours)      PT Evaluation and Treatment     Row Name 03/04/21 1345          Physical Therapy Time and Intention    Subjective Information  complains of;weakness;fatigue;pain  -MF     Document Type  therapy note (daily note);wound care  -MF     Mode of Treatment  individual therapy;physical therapy  -MF     Row Name 03/04/21 1345          General  Information    Patient Profile Reviewed  yes  -MF     Patient Observations  alert;cooperative;agree to therapy  -     Pertinent History of Current Functional Problem  open wound to L thigh   -     Risks Reviewed  patient:;increased discomfort  -     Benefits Reviewed  patient:;improve skin integrity  -     Barriers to Rehab  medically complex;previous functional deficit;physical barrier  -     Row Name 03/04/21 1345          Pain    Additional Documentation  Pain Scale: FACES Pre/Post-Treatment (Group)  -     Row Name 03/04/21 1345          Pain Scale: Numbers Pre/Post-Treatment    Pretreatment Pain Rating  0/10 - no pain  -     Posttreatment Pain Rating  0/10 - no pain  -     Row Name 03/04/21 1345          Pain Scale: Word Pre/Post-Treatment    Pain Intervention(s)  Repositioned  -     Row Name 03/04/21 0071          Health Promotion    Additional Documentation  NPWT (Negative Pressure Wound Therapy) (LDA)  -     Row Name 03/04/21 1345          Wound 02/26/21 Left anterior thigh Incision    Wound - Properties Group Placement Date: 02/26/21  -AO Present on Hospital Admission: N  -AO Side: Left  -AO Orientation: anterior  -AO Location: thigh  -AO Primary Wound Type: Incision  -AO    Wound Image  Images linked: 1  -MF     Dressing Appearance  intact;moist drainage  -MF     Base  moist;pink;red;slough;yellow  -MF     Periwound  intact;blanchable  -MF     Periwound Temperature  warm  -     Periwound Skin Turgor  firm  -MF     Edges  irregular  -MF     Wound Length (cm)  14.3 cm  -MF     Wound Width (cm)  3 cm  -MF     Wound Depth (cm)  2.5 cm  -MF     Undermining [Depth (cm)/Location]  6cm from 9:00-11:00  -MF     Drainage Characteristics/Odor  serosanguineous  -MF     Drainage Amount  moderate  -MF     Care, Wound  irrigated with;sterile normal saline;negative pressure wound therapy  -MF     Dressing Care  dressing changed  -MF     Wound Output (mL)  0  -MF     Retired Wound - Properties Group  Date first assessed: 02/26/21  -AO Present on Hospital Admission: N  -AO Side: Left  -AO Location: thigh  -AO Primary Wound Type: Incision  -AO    Row Name 03/04/21 1345          NPWT (Negative Pressure Wound Therapy) 03/04/21 1345 L thigh    NPWT (Negative Pressure Wound Therapy) - Properties Group Placement Date: 03/04/21  -MF Placement Time: 1345  -MF Location: L thigh  -MF Additional Comments: rehab wound vac #7  -MF    Therapy Setting  continuous therapy  -MF     Dressing  foam, black;foam, white  -     Pressure Setting  150 mmHg  -MF     Sponges Inserted  2 1 white 1 black   -MF     Sponges Removed  other (see comments) gauze packing  -MF     Finger sweep complete  Yes  -MF     Retired NPWT (Negative Pressure Wound Therapy) - Properties Group Placement Date: 03/04/21  - Placement Time: 1345  -MF Location: L thigh  -MF Additional Comments: rehab wound vac #7  -MF    Row Name 03/04/21 1345          Coping    Observed Emotional State  calm;cooperative  -     Verbalized Emotional State  acceptance  -     Trust Relationship/Rapport  care explained  -     Row Name 03/04/21 1345          Plan of Care Review    Plan of Care Reviewed With  patient  -     Outcome Summary  Pt presents with complex L thigh wound.  PT placed a wound vac to help manage exduate, increase granulation and improve healing potential.   -     Row Name 03/04/21 1345          Physical Therapy Goals    Wound Care Goal Selection (PT)  wound care, PT goal 1  -     Row Name 03/04/21 1345          Wound Care Goal 1 (PT)    Wound Care Goal 1 (PT)  Decrease wound size by 20% as evidence of wound healing   -     Time Frame (Wound Care Goal 1, PT)  10 days  -     Row Name 03/04/21 1345          Positioning and Restraints    Pre-Treatment Position  in bed  -     Post Treatment Position  bed  -     In Bed  supine;call light within reach  -     Row Name 03/04/21 1345          Therapy Assessment/Plan (PT)    Patient/Family Therapy  Goals Statement (PT)  wound healing   -MF     Rehab Potential (PT)  fair, will monitor progress closely  -MF     Criteria for Skilled Interventions Met (PT)  yes;meets criteria;skilled treatment is necessary  -     Row Name 03/04/21 1345          Therapy Plan Review/Discharge Plan (PT)    Therapy Plan Review (PT)  evaluation/treatment results reviewed;care plan/treatment goals reviewed;risks/benefits reviewed;current/potential barriers reviewed;participants voiced agreement with care plan;participants included;patient  -MF       User Key  (r) = Recorded By, (t) = Taken By, (c) = Cosigned By    Initials Name Provider Type    Lew Chin, PT Physical Therapist    Esperanza Calderón, RN Registered Nurse        Physical Therapy Education                 Title: PT OT SLP Therapies (In Progress)     Topic: Physical Therapy (In Progress)     Point: Mobility training (In Progress)     Learning Progress Summary           Patient Acceptance, E, NR by EJ at 3/3/2021 1442    Acceptance, E, NR by EJ at 3/1/2021 1521    Acceptance, E, NR by EJ at 2/26/2021 1107    Acceptance, E, NR by AS at 2/23/2021 1129    Acceptance, E, NR by EJ at 2/22/2021 1648    Acceptance, E, NR by LM at 2/20/2021 1038                   Point: Home exercise program (In Progress)     Learning Progress Summary           Patient Acceptance, E, NR by EJ at 3/3/2021 1442    Acceptance, E, NR by EJ at 3/1/2021 1521    Acceptance, E, NR by EJ at 2/26/2021 1107    Acceptance, E, NR by AS at 2/23/2021 1129    Acceptance, E, NR by EJ at 2/22/2021 1648                   Point: Precautions (In Progress)     Learning Progress Summary           Patient Acceptance, E, NR by EJ at 3/3/2021 1442    Acceptance, E, NR by EJ at 3/1/2021 1521    Acceptance, E, NR by EJ at 2/26/2021 1107    Acceptance, E, NR by AS at 2/23/2021 1129    Acceptance, E, NR by EJ at 2/22/2021 1648    Acceptance, E, NR by LM at 2/20/2021 1038                               User Key      Initials Effective Dates Name Provider Type Discipline    EJ 11/20/18 -  Donya Foreman, PT Physical Therapist PT    AS 06/22/15 -  Araceli Saravia, PTA Physical Therapy Assistant PT     07/24/19 -  Megan Peralta, XI Physical Therapist PT                Recommendation and Plan  Anticipated Discharge Disposition (PT): skilled nursing facility  Planned Therapy Interventions (PT): balance training, bed mobility training, gait training, home exercise program, motor coordination training, neuromuscular re-education, patient/family education, postural re-education, ROM (range of motion), stair training, strengthening, stretching, transfer training  Therapy Frequency (PT): daily  Plan of Care Reviewed With: patient           Outcome Summary: Pt presents with complex L thigh wound.  PT placed a wound vac to help manage exduate, increase granulation and improve healing potential.   Plan of Care Reviewed With: patient            Time Calculation  PT Charges     Row Name 03/04/21 1345             Time Calculation    Start Time  1345  -MF      PT Goal Re-Cert Due Date  03/13/21  -        User Key  (r) = Recorded By, (t) = Taken By, (c) = Cosigned By    Initials Name Provider Type     Lew La, PT Physical Therapist            Therapy Charges for Today     Code Description Service Date Service Provider Modifiers Qty    18664061691 HC PT RE-EVAL ESTABLISHED PLAN 2 3/4/2021 Lew La, PT GP 1    [id number]  PT NEG PRESS WOUND TO 50SQCM DME3 3/4/2021 Lew La, PT GP 1            PT G-Codes  Outcome Measure Options: AM-PAC 6 Clicks Daily Activity (OT)  AM-PAC 6 Clicks Score (PT): 17  AM-PAC 6 Clicks Score (OT): 20       Lew La, PT  3/4/2021

## 2021-03-04 NOTE — PROGRESS NOTES
[facility] Medicine Services  PROGRESS NOTE    Patient Name: Raquel Carranza  : 1955  MRN: [id number]    Date of Admission: 2021  Primary Care Physician: Sommer Paniagua MD    Subjective   Subjective     CC:  L groin pain    HPI:  Pt resting in bed after getting a bath. She reports tiredness. We reviewed how she takes her home medications. She only takes baclofen PRN, but it has been scheduled while here. She also takes gabapentin 600 mg at bedtime only because it makes her too tired. Presently have it scheduled 300mg BID. Will adjust back to her normal schedule. She resumed Xarelto yesterday per Dr Ken. Bowel regiment yesterday produced 2 large bowel movement. Pt states she feels much better.     ROS:  Gen- No fevers, chills  CV- No chest pain, palpitations  Resp- No cough, dyspnea  GI- No N/V/D, abd pain  All 14 systems reviewed and are neg, except those mentioned in HPI.    Objective   Objective     Vital Signs:   Temp:  [97.8 °F (36.6 °C)-98.2 °F (36.8 °C)] 97.9 °F (36.6 °C)  Heart Rate:  [54-70] 60  Resp:  [18] 18  BP: ()/(49-66) 96/66  Total (NIH Stroke Scale): 0     Physical Exam:  Constitutional: Awake, alert, no acute distress, morbidly obese  Eyes: PERRLA, sclerae anicteric, no conjunctival injection  HENT: NCAT, mucous membranes moist  Neck: Supple, no thyromegaly, no lymphadenopathy, trachea midline  Respiratory: Clear to auscultation bilaterally, nonlabored respirations   Cardiovascular: RRR, no murmurs, rubs, or gallops, palpable pedal pulses bilaterally  Gastrointestinal: Positive bowel sounds, soft, nontender, nondistended  Musculoskeletal: No bilateral ankle edema, no clubbing or cyanosis to extremities  Psychiatric: Appropriate affect, cooperative  Neurologic: Oriented x 3, strength symmetric in all extremities, Cranial Nerves grossly intact to confrontation, speech clear  Skin: No rashes. Left  Groin wound site with clean dressing, no  drainage.    Results Reviewed:  Results from last 7 days   Lab Units 03/03/21  0502 03/02/21  0452 03/01/21  0550  02/26/21  0438   WBC 10*3/mm3 6.89 8.70 9.93   < > 9.81   HEMOGLOBIN g/dL 12.5 11.3* 11.2*   < > 15.1   HEMATOCRIT % 41.2 37.2 36.8   < > 49.4*   PLATELETS 10*3/mm3 256 230 224   < > 241   INR   --   --   --   --  1.26*    < > = values in this interval not displayed.     Results from last 7 days   Lab Units 03/03/21  0502 03/02/21  0452 03/01/21  0550   SODIUM mmol/L 139 141 139   POTASSIUM mmol/L 4.1 4.2 4.0   CHLORIDE mmol/L 102 103 102   CO2 mmol/L 32.0* 32.0* 31.0*   BUN mg/dL 10 8 7*   CREATININE mg/dL 0.71 0.73 0.70   GLUCOSE mg/dL 94 98 94   CALCIUM mg/dL 8.8 8.7 8.6     Estimated Creatinine Clearance: 113.1 mL/min (by C-G formula based on SCr of 0.71 mg/dL).    Microbiology Results Abnormal     Procedure Component Value - Date/Time    Anaerobic Culture - Tissue, Thigh, Left [[id number]] Collected: 02/27/21 1402    Lab Status: Final result Specimen: Tissue from Thigh, Left Updated: 03/04/21 0734     Anaerobic Culture No anaerobes isolated at 5 days    Tissue / Bone Culture - Tissue, Thigh, Left [[id number]] Collected: 02/27/21 1402    Lab Status: Final result Specimen: Tissue from Thigh, Left Updated: 03/02/21 0742     Tissue Culture No growth at 3 days     Gram Stain Many (4+) WBCs seen      No organisms seen    Anaerobic Culture - Tissue, Thigh, Left [[id number]]  (Abnormal) Collected: 02/26/21 1524    Lab Status: Final result Specimen: Tissue from Thigh, Left Updated: 03/01/21 1246     Anaerobic Culture Prevotella loescheii    Wound Culture - Swab, Thigh, Left [[id number]] Collected: 02/26/21 1524    Lab Status: Final result Specimen: Swab from Thigh, Left Updated: 03/01/21 0620     Wound Culture No growth at 3 days     Gram Stain No organisms seen      Many (4+) WBCs per low power field      Rare (1+) Epithelial cells per low power field    AFB Culture - Tissue, Thigh, Left [[id number]] Collected:  02/27/21 1402    Lab Status: Preliminary result Specimen: Tissue from Thigh, Left Updated: 02/28/21 1034     AFB Stain No acid fast bacilli seen on concentrated smear    Urine Culture - Urine, Urine, Clean Catch [[id number]]  (Abnormal) Collected: 02/25/21 1611    Lab Status: Final result Specimen: Urine, Clean Catch Updated: 02/26/21 1300     Urine Culture Yeast isolated     Comment: No further workup.       Blood Culture - Blood, Arm, Left [[id number]] Collected: 02/19/21 1900    Lab Status: Final result Specimen: Blood from Arm, Left Updated: 02/24/21 1930     Blood Culture No growth at 5 days    Blood Culture - Blood, Arm, Right [[id number]] Collected: 02/19/21 1850    Lab Status: Final result Specimen: Blood from Arm, Right Updated: 02/24/21 1930     Blood Culture No growth at 5 days    COVID-19 and FLU A/B PCR - Swab, Nasopharynx [[id number]]  (Normal) Collected: 02/19/21 1911    Lab Status: Final result Specimen: Swab from Nasopharynx Updated: 02/19/21 2143     COVID19 Not Detected     Influenza A PCR Not Detected     Influenza B PCR Not Detected    Narrative:      Fact sheet for providers: https://www.fda.gov/media/825160/download    Fact sheet for patients: https://www.fda.gov/media/019808/download    Test performed by PCR.        Imaging Results (Last 24 Hours)     ** No results found for the last 24 hours. **        Results for orders placed during the hospital encounter of 02/19/21   Adult Transthoracic Echo Complete W/ Cont if Necessary Per Protocol (With Agitated Saline)    Narrative · Left ventricular ejection fraction appears to be 61 - 65%. Left   ventricular systolic function is normal.  · Left ventricular diastolic function was normal.        I have reviewed the medications:  Scheduled Meds:Pharmacy Consult, , Does not apply, BID  allopurinol, 300 mg, Oral, Daily  aspirin, 81 mg, Oral, Daily  bisacodyl, 10 mg, Rectal, Daily  cefdinir, 300 mg, Oral, Q12H  docusate sodium, 100 mg, Oral, BID  DULoxetine,  30 mg, Oral, Daily  famotidine, 20 mg, Oral, BID AC  ferrous sulfate, 325 mg, Oral, Daily With Breakfast  fluconazole, 200 mg, Oral, Q24H  gabapentin, 600 mg, Oral, Nightly  levothyroxine, 137 mcg, Oral, Q AM  metoprolol succinate XL, 25 mg, Oral, Daily  metroNIDAZOLE, 500 mg, Oral, Q8H  multivitamin, 1 tablet, Oral, Daily  polyethylene glycol, 17 g, Oral, Daily  rivaroxaban, 20 mg, Oral, Daily With Dinner  sodium chloride, 10 mL, Intravenous, Q12H  sodium hypochlorite, , Topical, Q12H  Vitamin D3, 50,000 Units, Oral, Q7 Days      Continuous Infusions:   PRN Meds:.acetaminophen  •  aluminum-magnesium hydroxide-simethicone  •  baclofen  •  hydrOXYzine  •  magnesium hydroxide  •  magnesium sulfate **OR** magnesium sulfate **OR** magnesium sulfate  •  Morphine  •  oxyCODONE-acetaminophen  •  sodium chloride  •  Insert peripheral IV **AND** sodium chloride  •  sodium chloride    Assessment/Plan   Assessment & Plan     Active Hospital Problems    Diagnosis  POA   • **Sepsis due to cellulitis (CMS/[facility]) [L03.90, A41.9]  Yes   • RAFA (acute kidney injury) (CMS/[facility]) [N17.9]  Unknown   • Arrhythmia [I49.9]  Unknown   • Hypertension [I10]  Unknown   • 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 rheumatoid factor (CMS/[facility]) [M05.79]  Unknown   • Sjogren's disease (CMS/[facility]) [M35.00]  Unknown   • Acquired hypothyroidism [E03.9]  Unknown   • CKD (chronic kidney disease) [N18.9]  Unknown   • Altered mental status [R41.82]  Unknown   • Hypoxia [R09.02]  Unknown   • Word finding difficulty [R47.89]  Unknown   • History of deep vein thrombosis of lower extremity [Z86.718]  Not Applicable      Resolved Hospital Problems   No resolved problems to display.     Brief Hospital Course to date:  Raquel Carranza is a 65 y.o. female with h/o hypothyroidism, HTN, RA, Sjogren's disease, DVT on Xarelto, MAHESH/CPAP, CKD, and arrhythmia who presented with sepsis due to Left groin cellulitis.  Since admission, pt's L groin site has  progressed to an abscess. She went to the OR with Dr. Ken on 2/26 for I&D.  Post op, pt had significant bleeding from wound site, so was taken back to the OR 2/27 with Dr. Bobby s/p electrocautery or suture ligation of multiple bleeding sites as well as I&D of wound as more necrotic tissue was noted.     This patient's problems and plans were partially entered by my partner and updated as appropriate by me 03/04/21.    Plan:    Sepsis/L groin cellulitis/abscess  --IV abx per ID  --ID following, initially assessed by surgery with no intervention warranted   --ultrasound and clinical exam on 2/25 c/w abscess. S/p I&D by Dr. Ken 2/26 and repeat I&D as well as control of bleeding sites by Dr. Bobby 2/27  --wound cultures with Prevotella  --03/03 okay per Dr Ken to resume xarelto    --03/04 planning to apply wound vac today.     Funguria  --from UCx obtained 2/25  --started on Fluconazole 2/26 by ID    Acute hypoxic respiratory failure  --thought secondary to volume overload s/p bumex  --ECHO with normal EF  --hypoxia likely due to OHS/MAHESH  --complicated by body habitus  --relatively stable overall  --doubt PE given chronic AC    Probable CKD  --stable    Sjogren's/RA  --not actively on any immunosuppression    Hx of DVT  --on Xarelto at home, initially on Heparin gtt here due to ? Need for surgery then transitioned back to Xarelto. Held Xarelto for possible intervention.  -- Xarelto resumed 03/03    AMS-resolved  --new visual disturbance/dizziness  --neurology reconsulted, appreciate their assistance. They have now signed off.   --possibly due to medications  --likely has significant anxiety component, added atarax    Left flank pain  --resolved, checked UA/UCx on 2/25 and findings as mentioned above    DVT Prophylaxis: resume Xarelto resumed 03/03    Disposition: I expect the patient to be discharged to [place] once bed available    CODE STATUS:   Code Status and Medical Interventions:   Ordered at: 02/19/21  2225     Code Status:    CPR     Medical Interventions (Level of Support Prior to Arrest):    Full     Electronically signed by BRUCE Rush, 03/04/21, 9:53 AM EST.

## 2021-03-04 NOTE — PLAN OF CARE
Goal Outcome Evaluation:  Plan of Care Reviewed With: patient  Progress: improving  Outcome Summary: Pt completed bed mob Lore, SBA STS at [facility] x4, 14 ft functional mob CGA at [facility], completed toileting CGA clothing management max A toilet hygiene with methods at home to allow for modified independence, tolerated standing ADL tasks sink side o2 above 90% throughout on 4L, cont IPOT per POC

## 2021-03-04 NOTE — NURSING NOTE
WOC follow up for: Left thigh/groin abscess.    Assessment: Patient up in chair, watching tv and reading the news on her computer.  In good spirits. KARLY left thigh/groin wound at this time.  PT wound care consulted for wound vac placement which I feel the patient may benefit from.      All skin interventions are in place.     WOC will continue to follow and await consultation from PT wound care to determine best plan of care.  Please contact with questions or if needs arise.

## 2021-03-04 NOTE — PLAN OF CARE
Goal Outcome Evaluation:  Plan of Care Reviewed With: patient     Outcome Summary: VSS on 5 L NC. Rested well throughout the night. IV ABX switched to PO. BID dressing changes on left groin, possible wound vac. No complaints from pt at this time. Plan is [facility] at D/C.

## 2021-03-04 NOTE — PROGRESS NOTES
INFECTIOUS DISEASE Progress Note    Raquel Carranza  1955  [id number]      Admission Date: 2/19/2021      Requesting Provider: Brock Mata MD  Evaluating Physician: Som Martinez MD    Reason for Consultation: Sepsis    History of present illness:    2/20/21: Patient is a 65 y.o. female with h/o hypothyroidism, HTN, RA, Sjogren's disease, DVT/Xarelto, MAHESH/CPAP, CKD, and arrhythmia who we were asked to see for sepsis.  She presented to [facility] ED on 2/19/21 with fever and left groin pain for the past 3 days.  She noted a boil that started in her left groin about 3 days ago that increased in size and became more painful.  She became more confused and had difficulty with word-finding capability.  She developed a fever of 103 at home yesterday and her son brought her to ED for further evaluation.  Initial evaluation showed Tmax 103, , PCT 4.46, Creatinine 1.22 (baseline about 1.2), WBC 17,900 with 81% segs/6% bands, and UA WBC 21-30 with small LE and negative nitrite.  A COVID-19 PCR was negative. Blood cultures are pending.  CT scan of neck and head along with MRI ruled out acute stroke.  A CXR is unremarkable.  A CT scan of LLE and pelvis showed left upper thigh cellulitis extending to left obturator muscle with no evidence of abscess.  She was started on Merrem and Vancomycin.  ID was asked to evaluate and manage her antibiotic therapy.     2/21/21: Her maximum temperature over the last 24 hours is 100.3. Her blood cultures have remained negative.  She states that she is allergic to penicillin but has previously tolerated Keflex.  She has decreased left groin/medial thigh pain.    2/22/21: She feels better today.  She has decreased left medial thigh.  She has remained afebrile.  She denies nausea and vomiting.    2/23/21:  She has remained afebrile. White blood cell count this morning is 12.9. She has decreased left medial thigh pain and swelling.    2/24/21: She has remained afebrile. She has decreased  left medial thigh pain.  Ultrasound today revealed a developing abscess.    2/25/21: She has remained afebrile. She has decreased left medial thigh pain.  She denies cough and sputum production.    2/26/21: She is scheduled for operative debridement of her left thigh abscess. She has remained afebrile. Her urinalysis reveals 31-50 white blood cells and 2+ yeast.  She denies increased left thigh pain.    2/27/21: She underwent operative debridement of her left thigh abscess yesterday.  She has remained afebrile overnight. Her abscess cultures are no growth so far.  The material was not malodorous.    3/1/21: She was taken back to the operating room on 2/27 for continued wound bleeding. Additional necrotic tissue was sent for culture. Abscess cultures from 2/26 were negative but antibiotic modified. Her hemoglobin this morning is 11.2 and her white blood cell count is 9.9. She has remained afebrile.      3/2/21: She has remained afebrile. Her anaerobic abscess cultures have grown Prevotella. She has decreased left thigh pain she denies nausea and vomiting.    3/3/21: She has remained afebrile. She has decreased left groin pain.  She denies nausea and vomiting.    3/4/21: She has remained afebrile overnight. She is awaiting approval for transfer to [facility]. After I saw her today, she apparently developed a transient facial rash after taking Cedinir          Past Medical History:   Diagnosis Date   • Abnormal glucose    • Acquired hypothyroidism    • Anxiety    • Arrhythmia    • Arthritis    • Bilateral edema of lower extremity    • Chronic joint pain    • Hypertension    • Iron deficiency anemia    • Irritable bowel syndrome    • 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 rheumatoid factor (CMS/HCC)    • Sjogren's disease (CMS/HCC)    • Thrombosis of right saphenous vein    • Vitamin D deficiency        Past Surgical History:   Procedure Laterality Date   • APPENDECTOMY     • HERNIA REPAIR     •  LEG EXCISION LESION/CYST Left 2/26/2021    Procedure: INCISION AND DRAINAGE LEFT THIGH ABCESS;  Surgeon: Noe Ken MD;  Location:  [facility];  Service: General;  Laterality: Left;   • LEG EXCISION LESION/CYST Left 2/27/2021    Procedure: WOUND EXPLORATION LEFT THIGH FOR CONTROL OF BLEEDING;  Surgeon: Sreedhar Bobby MD;  Location:  [facility];  Service: General;  Laterality: Left;       Family History   Problem Relation Age of Onset   • Cancer Mother         leukemia   • Hypertension Father    • Stroke Father    • Heart disease Father    • Cancer Father         colon   • Diabetes Brother    • Diabetes Paternal Aunt    • Diabetes Paternal Uncle        Social History     Socioeconomic History   • Marital status:      Spouse name: Not on file   • Number of children: Not on file   • Years of education: Not on file   • Highest education level: Not on file   Tobacco Use   • Smoking status: Current Every Day Smoker     Packs/day: 1.00     Types: Cigarettes   • Smokeless tobacco: Never Used   Substance and Sexual Activity   • Alcohol use: No   • Drug use: Never   • Sexual activity: Defer       Allergies   Allergen Reactions   • Ace Inhibitors    • Penicillins    • Adacel [Tetanus-Diphth-Acell Pertussis]      Preservative in the vaccine   • Lisinopril    • Zostavax [Zoster Vaccine Live]      Preservative in the vaccine   • Thimerosal Rash         Medication:    Current Facility-Administered Medications:   •  !Patient's home meds stored in pharmacy, , Does not apply, BID, Sreedhar Bobby MD  •  acetaminophen (TYLENOL) tablet 650 mg, 650 mg, Oral, Q6H PRN, Sreedhar Bobby MD, 650 mg at 02/28/21 1614  •  allopurinol (ZYLOPRIM) tablet 300 mg, 300 mg, Oral, Daily, Sreedhar Bobby MD, 300 mg at 03/03/21 0815  •  aluminum-magnesium hydroxide-simethicone (MAALOX MAX) 400-400-40 MG/5ML suspension 15 mL, 15 mL, Oral, Q6H PRN, Sreedhar Bobby MD, 15 mL at 02/19/21 2144  •  aspirin chewable tablet 81 mg,  81 mg, Oral, Daily, 81 mg at 03/03/21 0815 **OR** [DISCONTINUED] aspirin suppository 300 mg, 300 mg, Rectal, Daily, Noe Ken MD  •  baclofen (LIORESAL) tablet 10 mg, 10 mg, Oral, TID, Sreedhar Bobby MD, 10 mg at 03/03/21 2124  •  bisacodyl (DULCOLAX) suppository 10 mg, 10 mg, Rectal, Daily, Mellissa Kan APRN, 10 mg at 03/03/21 1504  •  cefdinir (OMNICEF) capsule 300 mg, 300 mg, Oral, Q12H, Som Martinez MD  •  docusate sodium (COLACE) capsule 100 mg, 100 mg, Oral, BID, Sreedhar Bobby MD, 100 mg at 03/03/21 0815  •  DULoxetine (CYMBALTA) DR capsule 30 mg, 30 mg, Oral, Daily, Sreedhar Bobby MD, 30 mg at 03/03/21 0814  •  famotidine (PEPCID) tablet 20 mg, 20 mg, Oral, BID AC, Sreedhar Bobby MD, 20 mg at 03/04/21 0632  •  ferrous sulfate tablet 325 mg, 325 mg, Oral, Daily With Breakfast, Sreedhar Bobby MD, 325 mg at 03/03/21 0814  •  fluconazole (DIFLUCAN) tablet 200 mg, 200 mg, Oral, Q24H, Som Martinez MD, 200 mg at 03/03/21 0816  •  gabapentin (NEURONTIN) capsule 300 mg, 300 mg, Oral, Q12H, Sreedhar Bobby MD, 300 mg at 03/03/21 2124  •  hydrOXYzine (ATARAX) tablet 25 mg, 25 mg, Oral, TID PRN, Sreedhar Bobby MD, 25 mg at 03/01/21 2131  •  levothyroxine (SYNTHROID, LEVOTHROID) tablet 137 mcg, 137 mcg, Oral, Q AM, Sreedhar Bobby MD, 137 mcg at 03/04/21 0632  •  magnesium hydroxide (MILK OF MAGNESIA) suspension 2400 mg/10mL 15 mL, 15 mL, Oral, Daily PRN, Uyen Martinez MD, 15 mL at 03/03/21 1140  •  Magnesium Sulfate 2 gram Bolus, followed by 8 gram infusion (total Mg dose 10 grams)- Mg less than or equal to 1mg/dL, 2 g, Intravenous, PRN **OR** Magnesium Sulfate 2 gram / 50mL Infusion (GIVE X 3 BAGS TO EQUAL 6GM TOTAL DOSE) - Mg 1.1 - 1.5 mg/dl, 2 g, Intravenous, PRN **OR** Magnesium Sulfate 4 gram infusion- Mg 1.6-1.9 mg/dL, 4 g, Intravenous, PRN, Sreedhar Bobby MD, Last Rate: 25 mL/hr at 02/22/21 1243, 4 g at 02/22/21 1243  •  metoprolol  succinate XL (TOPROL-XL) 24 hr tablet 25 mg, 25 mg, Oral, Daily, Sreedhar Bobby MD, 25 mg at 02/26/21 0943  •  metroNIDAZOLE (FLAGYL) tablet 500 mg, 500 mg, Oral, Q8H, Som Martinez MD, 500 mg at 03/04/21 0632  •  Morphine sulfate (PF) injection 4 mg, 4 mg, Intravenous, Q12H PRN, Sreedhar Bobby MD, 4 mg at 03/03/21 2124  •  multivitamin (THERAGRAN) tablet 1 tablet, 1 tablet, Oral, Daily, Sreedhar Bobby MD, 1 tablet at 03/03/21 0814  •  oxyCODONE-acetaminophen (PERCOCET) 5-325 MG per tablet 1 tablet, 1 tablet, Oral, Q4H PRN, Sreedhar Bobby MD, 1 tablet at 03/03/21 0900  •  polyethylene glycol (MIRALAX) packet 17 g, 17 g, Oral, Daily, Uyen Martinez MD, 17 g at 03/03/21 0814  •  rivaroxaban (XARELTO) tablet 20 mg, 20 mg, Oral, Daily With Dinner, Siena Shell, , 20 mg at 03/03/21 1745  •  sodium chloride 0.9 % flush 10 mL, 10 mL, Intravenous, PRN, Sreedhar Bobby MD  •  Insert peripheral IV, , , Once **AND** sodium chloride 0.9 % flush 10 mL, 10 mL, Intravenous, PRN, Sreedhar Bobby MD  •  sodium chloride 0.9 % flush 10 mL, 10 mL, Intravenous, Q12H, Sreedhar Bobby MD, 10 mL at 03/03/21 2125  •  sodium chloride 0.9 % flush 10 mL, 10 mL, Intravenous, PRN, Sreedhar Bobby MD  •  sodium hypochlorite (DAKIN'S 1/4 STRENGTH) 0.125 % topical solution 0.125% solution, , Topical, Q12H, Sreedhar Bobby MD, Given at 03/03/21 2125  •  Vitamin D3 50,000 Units **PATIENT SUPPLIED MED**, 50,000 Units, Oral, Q7 Days, Sreedhar Bobby MD, 50,000 Units at 02/26/21 1741    Antibiotics:  Anti-Infectives (From admission, onward)    Ordered     Dose/Rate Route Frequency Start Stop    03/03/21 1733  cefdinir (OMNICEF) capsule 300 mg     Ordering Provider: Som Martinez MD    300 mg Oral Every 12 Hours Scheduled 03/04/21 0900 03/14/21 0859    03/03/21 1730  metroNIDAZOLE (FLAGYL) tablet 500 mg     Ordering Provider: Som Martinez MD    500 mg Oral Every 8 Hours Scheduled 03/03/21  2200 21 2159    21 0734  fluconazole (DIFLUCAN) tablet 200 mg     Som Martinez MD reviewed the order on 21 0708.   Ordering Provider: Som Martinez MD    200 mg Oral Every 24 Hours 21 0900 21 0707    21 1226  DAPTOmycin (CUBICIN) 650 mg in sodium chloride 0.9 % 50 mL IVPB     Ordering Provider: Sreedhar Bobby MD    6 mg/kg × 105 kg (Adjusted)  100 mL/hr over 30 Minutes Intravenous Every 24 Hours 21 1315 21 1448    21 1851  meropenem (MERREM) 1 g/100 mL 0.9% NS VTB (mbp)     Ordering Provider: Amadou Templeton MD    1 g  over 30 Minutes Intravenous Once 21 1853 218    21 185  vancomycin 3000 mg/500 mL 0.9% NS IVPB ([facility])     Ordering Provider: Amadou Templeton MD    20 mg/kg × 167 kg Intravenous Once 21 1853 02/19/21 1939            Review of Systems:  See HPI    Physical Exam:   Vital Signs  Temp (24hrs), Av °F (36.7 °C), Min:97.8 °F (36.6 °C), Max:98.2 °F (36.8 °C)    Temp  Min: 97.8 °F (36.6 °C)  Max: 98.2 °F (36.8 °C)  BP  Min: 105/65  Max: 106/61  Pulse  Min: 54  Max: 70  Resp  Min: 18  Max: 20  SpO2  Min: 86 %  Max: 93 %    GENERAL: Awake and alert, in mild distress. She is obese and debilitated appearing  HEENT: Normocephalic, atraumatic.  PERRL. EOMI. No conjunctival injection. No icterus. Oropharynx clear without evidence of thrush or exudate.   NECK: Supple without nuchal rigidity.  HEART: RRR; No murmur,    LUNGS: Diminished at lung bases bilaterally without wheezing, rales, rhonchi. Normal respiratory effort. Nonlabored.  ABDOMEN: Soft, nontender, nondistended. No rebound or guarding.   EXT: Left thigh packing is in place with slight blood on the wound packing  :  Without Marx catheter. With external urinary cath.    SKIN: No rashes.  Left groin as above.  NEURO: Oriented to PPT. Normal speech and cognition.   PSYCHIATRIC: Normal insight and judgment. Cooperative with PE    Laboratory  Data    Results from last 7 days   Lab Units 03/03/21  0502 03/02/21  0452 03/01/21  0550   WBC 10*3/mm3 6.89 8.70 9.93   HEMOGLOBIN g/dL 12.5 11.3* 11.2*   HEMATOCRIT % 41.2 37.2 36.8   PLATELETS 10*3/mm3 256 230 224     Results from last 7 days   Lab Units 03/03/21  0502   SODIUM mmol/L 139   POTASSIUM mmol/L 4.1   CHLORIDE mmol/L 102   CO2 mmol/L 32.0*   BUN mg/dL 10   CREATININE mg/dL 0.71   GLUCOSE mg/dL 94   CALCIUM mg/dL 8.8                     Results from last 7 days   Lab Units 02/27/21  0525   CK TOTAL U/L 21         Estimated Creatinine Clearance: 113.1 mL/min (by C-G formula based on SCr of 0.71 mg/dL).      Microbiology:  Microbiology Results (last 10 days)     Procedure Component Value - Date/Time    Anaerobic Culture - Tissue, Thigh, Left [[id number]] Collected: 02/27/21 1402    Lab Status: Preliminary result Specimen: Tissue from Thigh, Left Updated: 03/02/21 0712     Anaerobic Culture No anaerobes isolated at 3 days    Tissue / Bone Culture - Tissue, Thigh, Left [[id number]] Collected: 02/27/21 1402    Lab Status: Final result Specimen: Tissue from Thigh, Left Updated: 03/02/21 0742     Tissue Culture No growth at 3 days     Gram Stain Many (4+) WBCs seen      No organisms seen    AFB Culture - Tissue, Thigh, Left [[id number]] Collected: 02/27/21 1402    Lab Status: Preliminary result Specimen: Tissue from Thigh, Left Updated: 02/28/21 1034     AFB Stain No acid fast bacilli seen on concentrated smear    Anaerobic Culture - Tissue, Thigh, Left [[id number]]  (Abnormal) Collected: 02/26/21 1524    Lab Status: Final result Specimen: Tissue from Thigh, Left Updated: 03/01/21 1246     Anaerobic Culture Prevotella loescheii    Wound Culture - Swab, Thigh, Left [[id number]] Collected: 02/26/21 1524    Lab Status: Final result Specimen: Swab from Thigh, Left Updated: 03/01/21 0620     Wound Culture No growth at 3 days     Gram Stain No organisms seen      Many (4+) WBCs per low power field      Rare (1+)  Epithelial cells per low power field    Urine Culture - Urine, Urine, Clean Catch [[id number]]  (Abnormal) Collected: 02/25/21 1611    Lab Status: Final result Specimen: Urine, Clean Catch Updated: 02/26/21 1300     Urine Culture Yeast isolated     Comment: No further workup.                 Radiology:  Imaging Results (Last 72 Hours)     ** No results found for the last 72 hours. **      Left thigh ultrasound reveals a developing abscess      Impression:   1. Left groin cellulitis/Abscess-Status post debridement.  Her cultures have grown Prevotella but were antibiotic modified and the purulent material was not malodorous at the time of surgery. She is now off of intravenous antibiotic therapy.  I started Cefdinir and Flagyl today.  She had a transient rash after the Cefdinir.  If this persists, I will switch the Cefdinir to Levaquin.  2. Elevated procalcitonin  3. Encephalopathy, improving  4. Rheumatoid arthritis  5. Sjogren's disease  6. DVT/Xarelto  7. MAHESH/CPAP  8. CKD IIIa  9. Hypothyroidism  10. Essential hypertension  11. Morbid Obesity  12. Ongoing tobacco abuse  13. Pcn allergy (hives and shortness of breath).  Has tolerated Keflex in the remote past.   14. Funguria-on fluconazole therapy.    PLAN/RECOMMENDATIONS:   1.  Discontinue daptomycin  2.  Discontinue ceftriaxone  3.  Left thigh wound care  4.  Fluconazole 200 mg by mouth daily  5.  Change Flagyl to 500 mg by mouth 3 times a day with food  6.   Cefdinir 300 mg by mouth twice a day        Som Martinez MD   3/4/2021  06:55 EST

## 2021-03-05 VITALS
DIASTOLIC BLOOD PRESSURE: 53 MMHG | BODY MASS INDEX: 44.41 KG/M2 | SYSTOLIC BLOOD PRESSURE: 116 MMHG | HEART RATE: 55 BPM | HEIGHT: 68 IN | RESPIRATION RATE: 18 BRPM | TEMPERATURE: 98.1 F | WEIGHT: 293 LBS | OXYGEN SATURATION: 91 %

## 2021-03-05 PROBLEM — E66.813 CLASS 3 OBESITY WITH ALVEOLAR HYPOVENTILATION WITHOUT SERIOUS COMORBIDITY WITH BODY MASS INDEX (BMI) OF 50.0 TO 59.9 IN ADULT: Status: ACTIVE | Noted: 2021-03-05

## 2021-03-05 PROBLEM — E66.2 CLASS 3 OBESITY WITH ALVEOLAR HYPOVENTILATION WITHOUT SERIOUS COMORBIDITY WITH BODY MASS INDEX (BMI) OF 50.0 TO 59.9 IN ADULT: Status: ACTIVE | Noted: 2021-03-05

## 2021-03-05 PROBLEM — N17.9 AKI (ACUTE KIDNEY INJURY) (HCC): Status: RESOLVED | Noted: 2021-02-20 | Resolved: 2021-03-05

## 2021-03-05 PROBLEM — J81.1 PULMONARY EDEMA: Status: ACTIVE | Noted: 2021-03-05

## 2021-03-05 PROBLEM — E66.2 OBESITY HYPOVENTILATION SYNDROME (HCC): Status: ACTIVE | Noted: 2021-03-05

## 2021-03-05 PROBLEM — E66.01 OBESITY, MORBID, BMI 50 OR HIGHER: Status: ACTIVE | Noted: 2021-03-05

## 2021-03-05 PROCEDURE — 97116 GAIT TRAINING THERAPY: CPT

## 2021-03-05 PROCEDURE — 99239 HOSP IP/OBS DSCHRG MGMT >30: CPT | Performed by: INTERNAL MEDICINE

## 2021-03-05 PROCEDURE — 97605 NEG PRS WND THER DME<=50SQCM: CPT

## 2021-03-05 PROCEDURE — 93010 ELECTROCARDIOGRAM REPORT: CPT | Performed by: INTERNAL MEDICINE

## 2021-03-05 PROCEDURE — 93005 ELECTROCARDIOGRAM TRACING: CPT | Performed by: INTERNAL MEDICINE

## 2021-03-05 RX ORDER — OXYCODONE HYDROCHLORIDE AND ACETAMINOPHEN 5; 325 MG/1; MG/1
0.5 TABLET ORAL EVERY 4 HOURS PRN
Qty: 18 TABLET | Refills: 0 | Status: SHIPPED | OUTPATIENT
Start: 2021-03-05

## 2021-03-05 RX ORDER — ASPIRIN 81 MG/1
81 TABLET, CHEWABLE ORAL DAILY
Qty: 30 TABLET | Refills: 0 | Status: SHIPPED | OUTPATIENT
Start: 2021-03-06 | End: 2022-05-10

## 2021-03-05 RX ORDER — CEFDINIR 300 MG/1
300 CAPSULE ORAL EVERY 12 HOURS SCHEDULED
Qty: 17 CAPSULE | Refills: 0 | Status: SHIPPED | OUTPATIENT
Start: 2021-03-05 | End: 2021-03-14

## 2021-03-05 RX ORDER — METRONIDAZOLE 500 MG/1
500 TABLET ORAL EVERY 8 HOURS SCHEDULED
Qty: 24 TABLET | Refills: 0 | Status: SHIPPED | OUTPATIENT
Start: 2021-03-05 | End: 2021-03-13

## 2021-03-05 RX ORDER — FLUCONAZOLE 200 MG/1
200 TABLET ORAL EVERY 24 HOURS
Qty: 1 TABLET | Refills: 0 | Status: SHIPPED | OUTPATIENT
Start: 2021-03-06 | End: 2021-03-07

## 2021-03-05 RX ADMIN — ASPIRIN 81 MG: 81 TABLET, CHEWABLE ORAL at 08:52

## 2021-03-05 RX ADMIN — DOCUSATE SODIUM 100 MG: 100 CAPSULE, LIQUID FILLED ORAL at 08:52

## 2021-03-05 RX ADMIN — LEVOTHYROXINE SODIUM 137 MCG: 0.14 TABLET ORAL at 06:30

## 2021-03-05 RX ADMIN — METRONIDAZOLE 500 MG: 500 TABLET ORAL at 06:30

## 2021-03-05 RX ADMIN — FAMOTIDINE 20 MG: 20 TABLET, FILM COATED ORAL at 06:30

## 2021-03-05 RX ADMIN — FERROUS SULFATE TAB 325 MG (65 MG ELEMENTAL FE) 325 MG: 325 (65 FE) TAB at 08:53

## 2021-03-05 RX ADMIN — ALLOPURINOL 300 MG: 300 TABLET ORAL at 08:52

## 2021-03-05 RX ADMIN — FLUCONAZOLE 200 MG: 200 TABLET ORAL at 08:53

## 2021-03-05 RX ADMIN — METRONIDAZOLE 500 MG: 500 TABLET ORAL at 14:13

## 2021-03-05 RX ADMIN — DULOXETINE HYDROCHLORIDE 30 MG: 30 CAPSULE, DELAYED RELEASE ORAL at 08:52

## 2021-03-05 RX ADMIN — SODIUM CHLORIDE, PRESERVATIVE FREE 10 ML: 5 INJECTION INTRAVENOUS at 08:54

## 2021-03-05 RX ADMIN — METOPROLOL SUCCINATE 25 MG: 25 TABLET, EXTENDED RELEASE ORAL at 08:52

## 2021-03-05 RX ADMIN — CEFDINIR 300 MG: 300 CAPSULE ORAL at 08:54

## 2021-03-05 RX ADMIN — MULTIVITAMIN TABLET 1 TABLET: TABLET at 08:52

## 2021-03-05 RX ADMIN — Medication 50000 UNITS: at 12:26

## 2021-03-05 NOTE — DISCHARGE PLACEMENT REQUEST
"Tiff Amador (65 y.o. Female)     Date of Birth Social Security Number Address Home Phone MRN    1955  [address]  [facility] [address] [phone] [id number]    Orthodox Marital Status          [place]        Admission Date Admission Type Admitting Provider Attending Provider Department, Room/Bed    2/19/21 Emergency Siena Shell DO Roesch, Lyndsey, DO [facility] 4H, S473/1    Discharge Date Discharge Disposition Discharge Destination                       Attending Provider: Siena Shell DO    Allergies: Ace Inhibitors, Penicillins, Adacel [Tetanus-diphth-acell Pertussis], Lisinopril, Zostavax [Zoster Vaccine Live], Thimerosal    Isolation: None   Infection: None   Code Status: CPR    Ht: 172.7 cm (68\")   Wt: 167 kg (368 lb 2.7 oz)    Admission Cmt: None   Principal Problem: Sepsis due to cellulitis (CMS/[facility]) [L03.90,A41.9]                 Active Insurance as of 2/19/2021     Primary Coverage     Payor Plan Insurance Group Employer/Plan Group    HUMANA MEDICARE REPLACEMENT HUMANA MEDICARE REPLACEMENT [id number]     Payor Plan Address Payor Plan Phone Number Payor Plan Fax Number Effective Dates    [address] [address] [phone]  8/1/2018 - None Entered    [facility] [id number]       Subscriber Name Subscriber Birth Date Member ID       TIFF AMADOR 1955 [id number]                 Emergency Contacts      (Rel.) Home Phone Work Phone Mobile Phone    Ronald Amador (Son) -- -- [phone]        Jerilyn Hunt, PT   Physical Therapist   Specialty:  Physical Therapy   Plan of Care   Signed   Date of Service:  03/05/21 1051   Creation Time:  03/05/21 1125            Signed             Goal Outcome Evaluation:  Plan of Care Reviewed With: patient  Progress: improving  Outcome Summary: Pt increased ambulation distance to 150 ft with RW and SBA. Distance limited by fatigue, weakness, SOA. SpO2 desat 86% on RA, recovered within 2 min seated rest on " "3LO2NC. Improved endurance and activity tolerance. Continue to recommend IRF for best outcomes with functional mobility, pt refusing rehab. Safe to go home, pt states her son will be able to assist PRN 1-2 weeks upon d/c.     Time Position Oxygen (L/min) SpO2%   1101 Sitting 3 95   1102 Sitting Room Air 91   1103 Standing Room Air 90   1104 Ambulating Room Air 86   1105 Sitting (Recovery) 3 94                  [address] [address]  Dept. Phone:  [phone]  Dept. Fax:  [phone] Date Ordered: Mar 5, 2021         Patient:  Raquel Carranza MRN:  [id number]   [address]  [facility] [address] :  1955  SSN:    Phone: [phone] Sex:  F     Weight: 167 kg (368 lb 2.7 oz)         Ht Readings from Last 1 Encounters:   21 172.7 cm (68\")         Oxygen Therapy         (Order ID: [id number])    Diagnosis:  Hypoxia (R09.02 [ICD-10-CM] 799.02 [ICD-9-CM])  History of deep vein thrombosis of lower extremity (Z86.718 [ICD-10-CM] V12.51 [ICD-9-CM])  Class 3 obesity with alveolar hypoventilation without serious comorbidity with body mass index (BMI) of 50.0 to 59.9 in adult (CMS/[facility]) (E66.2,Z68.43 [ICD-10-CM] 278.03,V85.43 [ICD-9-CM])   Quantity:  1     Delivery Modality: Nasal Cannula  Liters Per Minute: 3  Duration: Continuous  Equipment:  Oxygen Concentrator &  &  Portable Gaseous Oxygen System & Portable Oxygen Contents Gaseous &  Conserving Regulator  Length of Need (99 Months = Lifetime): 99 Months = Lifetime        Authorizing Provider's Phone: [phone]   Authorizing Provider:Siena Shell DO  Authorizing Provider's NPI: [id number]  Order Entered By: Maegan Love RN 3/5/2021  2:06 PM     Electronically signed by: Siena Shell DO 3/5/2021  2:06 PM        Contains abnormal data Blood Gas, Arterial With Co-Ox  Status:  Final result  Component   Ref Range & Units 21 1128   Site  Right Radial    Ravi's " Test  N/A    pH, Arterial   7.350 - 7.450 pH units 7.457High     Comment: 83 Value above reference range   pCO2, Arterial   35.0 - 45.0 mm Hg 44.5    pO2, Arterial   83.0 - 108.0 mm Hg 75.6Low     Comment: 84 Value below reference range   HCO3, Arterial   20.0 - 26.0 mmol/L 31.4High     Base Excess, Arterial   0.0 - 2.0 mmol/L 6.5High     Hemoglobin, Blood Gas   14 - 18 g/dL 15.2    Hematocrit, Blood Gas   % 46.5    Oxyhemoglobin   94 - 99 % 95.7    Methemoglobin   0.00 - 1.50 % 0.10    Carboxyhemoglobin   0 - 2 % 0.8    CO2 Content   22 - 33 mmol/L 32.8    Temperature   C 37.0    Barometric Pressure for Blood Gas     Comment: N/A   Modality  Nasal Cannula    FIO2   % 46    Rate   Breaths/minute 0    PIP   cmH2O 0    Comment: Meter: [id number]     :  [id number]   IPAP  0    EPAP  0    Note     pH, Temp Corrected   pH Units 7.457    pCO2, Temperature Corrected   35 - 45 mm Hg 44.5    pO2, Temperature Corrected   83 - 108 mm Hg 75.6Low     Resulting Agency [facility]        Specimen Collected: 21 112 Last Resulted: 21 1128 View Other Order Details               History & Physical      Elaine New DO at 21 0027              [facility] Medicine Services  HISTORY AND PHYSICAL    Patient Name: Raquel Carranza  : 1955  MRN: [id number]  Primary Care Physician: Sommer Paniagua MD  Date of admission: 2021      Subjective   Subjective     Chief Complaint:  Fever and left groin pain    HPI:  Raquel Carranza is a 65 y.o. female with a PMH significant for history of DVT on Xarelto, cardiac arrhythmia, HTN, MAHESH on CPAP, rheumatoid arthritis, Sjogren's disease, CKD who presents to the ED ED due to fever and left groin pain.  Patient reports onset of symptoms 3 days ago when she began to experience a boil in her left groin with associated low-grade fever.  Since that time the boil has gotten larger, more painful and she has began to experience confusion and word  finding difficulty.  Patient reports associated fatigue, malaise, headache, nausea and vomiting.  Today she had a fever of 103 at home and was having difficulty speaking.  Her son was concerned and brought her to the ED for further evaluation.  She denies cough, shortness of breath, change in taste or smell or known exposure to COVID-19.        COVID Details: [] No Symptoms  Symptoms: [x] Fever []  Cough [] Shortness of breath [] Change in taste or smell  Risks:  [] Direct Exposure [] High risk facility   Date of Onset:  ____  Date of first positive COVID test:     Review of Systems   Constitutional: Positive for appetite change, fatigue and fever.   HENT: Negative.    Eyes: Negative.    Respiratory: Negative.    Cardiovascular: Negative.    Gastrointestinal: Positive for nausea and vomiting. Negative for abdominal pain and diarrhea.   Endocrine: Negative.    Genitourinary: Negative.    Musculoskeletal: Negative.    Skin: Positive for color change and rash.   Allergic/Immunologic: Negative.    Neurological: Positive for speech difficulty.   Hematological: Positive for adenopathy.   Psychiatric/Behavioral: Positive for confusion.        All other systems reviewed and are negative.     Personal History     Past Medical History:   Diagnosis Date   • Abnormal glucose    • Acquired hypothyroidism    • Anxiety    • Arrhythmia    • Arthritis    • Bilateral edema of lower extremity    • Chronic joint pain    • Hypertension    • Iron deficiency anemia    • Irritable bowel syndrome    • 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 rheumatoid factor (CMS/HCC)    • Sjogren's disease (CMS/HCC)    • Thrombosis of right saphenous vein    • Vitamin D deficiency        Past Surgical History:   Procedure Laterality Date   • APPENDECTOMY     • HERNIA REPAIR         Family History: family history includes Cancer in her father and mother; Diabetes in her brother, paternal aunt, and paternal uncle; Heart disease in her  father; Hypertension in her father; Stroke in her father. Otherwise pertinent FHx was reviewed and unremarkable.     Social History:  reports that she has been smoking cigarettes. She has been smoking about 1.00 pack per day. She has never used smokeless tobacco. She reports that she does not drink alcohol or use drugs.  Social History     Social History Narrative   • Not on file       Medications:  Available home medication information reviewed.  Medications Prior to Admission   Medication Sig Dispense Refill Last Dose   • allopurinol (ZYLOPRIM) 300 MG tablet Take 300 mg by mouth Daily.      • baclofen (LIORESAL) 20 MG tablet Take 20 mg by mouth 3 (Three) Times a Day.      • DULoxetine (CYMBALTA) 30 MG capsule Take 30 mg by mouth daily.      • Ergocalciferol (VITAMIN D2 PO) Take 1.25 mg by mouth 1 (One) Time Per Week.      • famotidine (PEPCID) 20 MG tablet Take 20 mg by mouth 2 (Two) Times a Day.      • ferrous sulfate 325 (65 FE) MG tablet Take 325 mg by mouth Daily With Breakfast.      • gabapentin (NEURONTIN) 300 MG capsule Take 300 mg by mouth 2 (two) times a day.      • levothyroxine (SYNTHROID, LEVOTHROID) 137 MCG tablet TAKE 1 TABLET BY MOUTH DAILY  3    • metoprolol succinate XL (TOPROL-XL) 25 MG 24 hr tablet Take 25 mg by mouth daily.      • oxyCODONE-acetaminophen (PERCOCET) 5-325 MG per tablet 1/2-1 TABLET BY MOUTH EVERY SIX HOURS, AS NEEDED  0    • Rivaroxaban (XARELTO STARTER PACK) 15 & 20 MG tablet therapy pack Take as directed: 15 mg by mouth twice daily for 21 days, then 20 mg once daily. 51 each 0    • spironolactone (ALDACTONE) 50 MG tablet Take 75 mg by mouth Daily.      • betamethasone dipropionate (DIPROLENE) 0.05 % cream Apply  topically 2 (two) times a day.      • clindamycin (CLEOCIN) 300 MG capsule Take 1 capsule by mouth 4 (four) times a day. 28 capsule 0    • clonazePAM (KlonoPIN) 0.5 MG tablet Take 0.5 mg by mouth 2 (two) times a day as needed for seizures.      • folic acid (FOLVITE) 1  MG tablet Take 1 mg by mouth daily.      • mupirocin (BACTROBAN) 2 % ointment Apply 1 application topically 3 (three) times a day.      • ranitidine (ZANTAC) 150 MG tablet Take 150 mg by mouth 2 (two) times a day.          Allergies   Allergen Reactions   • Ace Inhibitors    • Penicillins    • Adacel [Tetanus-Diphth-Acell Pertussis]      Preservative in the vaccine   • Lisinopril    • Zostavax [Zoster Vaccine Live]      Preservative in the vaccine   • Thimerosal Rash       Objective   Objective     Vital Signs:   Temp:  [99.7 °F (37.6 °C)-103 °F (39.4 °C)] 99.7 °F (37.6 °C)  Heart Rate:  [88-92] 88  Resp:  [18-20] 20  BP: (115-134)/(46-63) 115/46  Flow (L/min):  [2] 2  Total (NIH Stroke Scale): 5    Physical Exam   Constitutional: Awake, alert  Eyes: PERRLA, sclerae anicteric, no conjunctival injection  HENT: NCAT, mucous membranes moist  Neck: Supple, no thyromegaly, no lymphadenopathy, trachea midline  Respiratory: Clear to auscultation bilaterally, nonlabored respirations   Cardiovascular: RRR, no murmurs, rubs, or gallops, palpable pedal pulses bilaterally  Gastrointestinal: Positive bowel sounds, soft, nontender, nondistended  Musculoskeletal: Bilateral ankle edema, no clubbing or cyanosis to extremities  Psychiatric: Appropriate affect, cooperative  Neurologic: Oriented x 3, strength symmetric in all extremities, Cranial Nerves grossly intact to confrontation, speech clear  Skin: Edema, swelling and warmth to left groin      Result Review:  I have personally reviewed the results from the time of this admission to 02/20/21 12:27 AM EST and agree with these findings:  [x]  Laboratory  []  Microbiology  [x]  Radiology  []  EKG/Telemetry   []  Cardiology/Vascular   []  Pathology  []  Old records  []  Other:  Most notable findings include: Labs consistent with sepsis      LAB RESULTS:      Lab 02/19/21  1901 02/19/21  1853 02/19/21  1824 02/19/21  1822   WBC  --  17.92*  --   --    HEMOGLOBIN  --  17.3*  --   --     HEMOGLOBIN, POC  --   --   --  18.7*   HEMATOCRIT  --  53.0*  --   --    HEMATOCRIT POC  --   --   --  55*   PLATELETS  --  158  --   --    NEUTROS ABS  --  15.59*  --   --    EOS ABS  --  0.00  --   --    MCV  --  102.5*  --   --    PROCALCITONIN  --  4.46*  --   --    LACTATE 1.8  --   --   --    PROTIME  --   --  28.3*  --    APTT  --  29.9  --   --          Lab 02/19/21  1853 02/19/21  1821   SODIUM 135*  --    POTASSIUM 4.2  --    CHLORIDE 96*  --    CO2 26.0  --    ANION GAP 13.0  --    BUN 18  --    CREATININE 1.22* 1.20   GLUCOSE 122*  --    CALCIUM 9.5  --          Lab 02/19/21 1853   TOTAL PROTEIN 6.7   ALBUMIN 3.40*   GLOBULIN 3.3   ALT (SGPT) 10   AST (SGOT) 15   BILIRUBIN 1.7*   ALK PHOS 94         Lab 02/19/21  1853 02/19/21  1824   TROPONIN T <0.010  --    PROTIME  --  28.3*   INR  --  2.5*                 Lab 02/19/21 1822   PH, ARTERIAL 7.45     Brief Urine Lab Results  (Last result in the past 365 days)      Color   Clarity   Blood   Leuk Est   Nitrite   Protein   CREAT   Urine HCG        02/19/21 1854 Dark Yellow Clear Negative Small (1+) Negative 30 mg/dL (1+)             Microbiology Results (last 10 days)     Procedure Component Value - Date/Time    COVID-19 and FLU A/B PCR - Swab, Nasopharynx [[id number]]  (Normal) Collected: 02/19/21 1911    Lab Status: Final result Specimen: Swab from Nasopharynx Updated: 02/19/21 2143     COVID19 Not Detected     Influenza A PCR Not Detected     Influenza B PCR Not Detected    Narrative:      Fact sheet for providers: https://www.fda.gov/media/802858/download    Fact sheet for patients: https://www.fda.gov/media/809561/download    Test performed by PCR.          Ct Angiogram Neck    Result Date: 2/19/2021  EXAMINATION: CT ANGIOGRAM HEAD-, CT ANGIOGRAM NECK-  INDICATION: code stroke  TECHNIQUE: Axial IV arterial phase contrast-enhanced CT angiogram of the head and neck. Three-dimensional reconstructions were postprocessed.  The radiation dose reduction  device was turned on for each scan per the ALARA (As Low as Reasonably Achievable) protocol.  COMPARISON: NONE  FINDINGS: The lung apices are grossly clear evaluation of the neck soft tissues demonstrates no pathologic adenopathy. There is no evidence of acute fracture or aggressive osseous lesion. Patent aortic arch and great vessel origins. The common carotid arteries are medialized bilaterally. On the right, no significant atherosclerotic narrowing, 0% by NASCET criteria. Similar 0% narrowing on the left. The vertebral arteries are grossly normal in course and caliber, with the origins partially obscured by photon starvation due to surrounding soft tissue. Intracranially, the carotid siphons demonstrate no significant atherosclerotic narrowing. Bilateral anterior, middle and posterior cerebral arteries are patent, normal in course and caliber. Prominent bilateral posterior communicating arteries are present. The vertebrobasilar system is unremarkable.      Impression: Essentially normal CT angiogram of the head with no evidence of flow-limiting stenosis, large vessel occlusion or aneurysmal dilatation.   This report was finalized on 2/19/2021 6:58 PM by Miguel Real.      Ct Pelvis Without Contrast    Result Date: 2/19/2021  CT Pelvis WO INDICATION: Sepsis with rash in the left thigh toward the groin TECHNIQUE: CT of the pelvis without IV contrast. Coronal and sagittal reconstructions were obtained.  Radiation dose reduction techniques included automated exposure control or exposure modulation based on body size. Count of known CT and cardiac nuc med studies performed in previous 12 months: 6. COMPARISON: 6/12/2015 FINDINGS: There is subtle soft tissue swelling seen at the left groin extending up toward the obturator foramen with mild edematous change seen in the obturator muscles. There is no evidence of an abscess. Within the pelvis itself there is no evidence of adenopathy or fluid collection. There are  mildly prominent lymph nodes in the left inguinal region that are probably reactive. No evidence of bone destruction. No distended bowel loops.     Impression: Mild reactive lymphadenopathy at the left groin. Mild edematous changes at the left groin extending toward the obturator foramen and involving the obturator muscles to a minimal degree. No evidence of abscess either adjacent to or within the pelvis. Signer Name: Trell Barnhart MD  Signed: 2/19/2021 7:50 PM  Workstation Name: [facility]  Radiology Psychiatric    Xr Chest 1 View    Result Date: 2/19/2021  CR Chest 1 Vw INDICATION: Acute stroke protocol with sepsis COMPARISON:  None available. FINDINGS: Single portable AP view(s) of the chest.  There is mild cardiomegaly. Both lungs are clear with normal vascular markings. No effusions are seen.     Impression: Cardiomegaly. The lungs are clear. Signer Name: Trell Barnhart MD  Signed: 2/19/2021 7:51 PM  Workstation Name: [email or web address]    Ct Angiogram Head    Result Date: 2/19/2021  EXAMINATION: CT ANGIOGRAM HEAD-, CT ANGIOGRAM NECK-  INDICATION: code stroke  TECHNIQUE: Axial IV arterial phase contrast-enhanced CT angiogram of the head and neck. Three-dimensional reconstructions were postprocessed.  The radiation dose reduction device was turned on for each scan per the ALARA (As Low as Reasonably Achievable) protocol.  COMPARISON: NONE  FINDINGS: The lung apices are grossly clear evaluation of the neck soft tissues demonstrates no pathologic adenopathy. There is no evidence of acute fracture or aggressive osseous lesion. Patent aortic arch and great vessel origins. The common carotid arteries are medialized bilaterally. On the right, no significant atherosclerotic narrowing, 0% by NASCET criteria. Similar 0% narrowing on the left. The vertebral arteries are grossly normal in course and caliber, with the origins partially obscured by photon starvation due to  surrounding soft tissue. Intracranially, the carotid siphons demonstrate no significant atherosclerotic narrowing. Bilateral anterior, middle and posterior cerebral arteries are patent, normal in course and caliber. Prominent bilateral posterior communicating arteries are present. The vertebrobasilar system is unremarkable.      Impression: Essentially normal CT angiogram of the head with no evidence of flow-limiting stenosis, large vessel occlusion or aneurysmal dilatation.   This report was finalized on 2/19/2021 6:58 PM by Miguel Real.      Ct Head Without Contrast Stroke Protocol    Result Date: 2/19/2021  EXAMINATION: CT HEAD WO CONTRAST STROKE PROTOCOL-  INDICATION: Stroke, follow up  TECHNIQUE: Axial noncontrast CT of the head with multiplanar reconstruction  The radiation dose reduction device was turned on for each scan per the ALARA (As Low as Reasonably Achievable) protocol.  COMPARISON: NONE  FINDINGS: Gray-white differentiation is maintained and there is no evidence of intracranial hemorrhage, mass or mass effect. Age-related changes of the brain are present including volume loss and typical periventricular sequela of chronic small vessel ischemia. There is otherwise no evidence of intracranial hemorrhage, mass or mass effect. The ventricles are normal in size and configuration accounting for surrounding volume loss. The orbits are normal and the paranasal sinuses are grossly clear.      Impression: Age-related changes of the brain as above, otherwise without evidence of acute intracranial abnormality.  Scan performed at 6:08 PM 2/19/2021 results related to the stroke team via the CT technologist at 6:17 PM same day  This report was finalized on 2/19/2021 6:31 PM by Miguel Real.      Ct Lower Extremity Left Without Contrast    Result Date: 2/19/2021  CT LE LT WO INDICATION:  Pain and swelling to the left thigh with sepsis TECHNIQUE: CT of the left thigh without IV contrast. Coronal and  sagittal reconstructions were obtained.  Radiation dose reduction techniques included automated exposure control or exposure modulation based on body size. Count of known CT and cardiac nuc med studies performed in previous 12 months: 5.  COMPARISON:  None available. FINDINGS: There is soft tissue swelling with induration of subcutaneous fat along the medial aspect of the left upper thigh extending up toward the groin. Mild edematous changes extend up as high as the obturator muscles at the low pelvis. There is no evidence of a discrete abscess. There are mildly prominent inguinal lymph nodes on the left that are likely reactive. No deep inflammatory changes are seen. No destructive bone lesions are seen in the femur or pelvis.     Impression: Inflammatory changes are seen consistent with cellulitis involving the upper medial thigh and extending up toward the obturator muscles at the low pelvis. No abscess is identified. Signer Name: Trell Barnhart MD  Signed: 2/19/2021 7:42 PM  Workstation Name: [email or web address]    Ct Cerebral Perfusion With & Without Contrast    Result Date: 2/19/2021  EXAMINATION: CT CEREBRAL PERFUSION W WO CONTRAST-  INDICATION: code stroke  TECHNIQUE: Cerebral perfusion analysis was performed using computed tomography with contrast administration, including post processing of parametric maps with determination of cerebral blood flow, cerebral blood volume, and mean transit time.  The radiation dose reduction device was turned on for each scan per the ALARA (As Low as Reasonably Achievable) protocol.  COMPARISON: Noncontrast CT head performed concurrently  FINDINGS: Perfusion maps demonstrate relative symmetry involving cerebral blood flow and cerebral blood volume, without definite evidence of core infarct or large area of ischemic tissue at risk.      Impression: Relatively symmetric perfusion with no evidence of core infarct or ischemic tissue at risk  This  report was finalized on 2/19/2021 6:53 PM by Miguel Real.             Assessment/Plan   Assessment & Plan     Active Hospital Problems    Diagnosis POA   • **Sepsis due to cellulitis (CMS/[facility]) [L03.90, A41.9] Yes   • RAFA (acute kidney injury) (CMS/[facility]) [N17.9] Unknown   • Arrhythmia [I49.9] Unknown   • Hypertension [I10] Unknown   • 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 rheumatoid factor (CMS/[facility]) [M05.79] Unknown   • Sjogren's disease (CMS/[facility]) [M35.00] Unknown   • Acquired hypothyroidism [E03.9] Unknown   • CKD (chronic kidney disease) [N18.9] Unknown   • Altered mental status [R41.82] Unknown   • Hypoxia [R09.02] Unknown   • Word finding difficulty [R47.89] Unknown   • History of deep vein thrombosis of lower extremity [Z86.718] Not Applicable   This is a 65-year-old female patient with a PMH significant for arrhythmia, CKD, HTN, RA, Sjogren's disease, hypothyroidism who presents to the ED due to fever and left groin pain found to meet sepsis criteria.    Sepsis  Cellulitis  Altered mental status  --Tachycardic, leukocytosis, elevated procalcitonin, source of infection  --blood cultures  --suspected source is cellulitis to left groin.  No obvious abscess on CT scan, consider general surgery consult based on response to antibiotic therapy  -Consider ID consult  --broad spectrum abx coverage with vancomycin and meropenem (severe penicillin allergy)  --IVFs    RAFA versus CKD  -Unsure of baseline, patient reports history of CKD.  Request outside records  -Hold nephrotoxic medications  -IVF  -A.m. labs    Word finding difficulty  -Likely secondary to acute illness with sepsis  -CTA head and neck, CT perfusion without acute findings  -We will get MRI  -Echocardiogram  -FLP, A1c for a.m.  -Aspirin, atorvastatin  -Neurochecks  -Neurology consult    Hypoxia  MAHESH  -PE unlikely, on home Xarelto  -Possible obesity hypoventilation syndrome/MAHESH  -Echo for a.m.  -CPAP    History of DVT  -Heparin  drip  -Hold home Xarelto in case patient needs to go to the OR for developing abscess    Arrhythmia  -Data deficit  -Request outside records    Sjogren's disease  Rheumatoid arthritis  -No active immunosuppressive medication    Hypothyroidism  Hypertension  -Continue home meds    Home med list reviewed      DVT prophylaxis: Heparin drip      CODE STATUS: Full code  Code Status and Medical Interventions:   Ordered at: 02/19/21 2129     Code Status:    CPR     Medical Interventions (Level of Support Prior to Arrest):    Full        Admission Status:  I believe this patient meets INPATIENT status due to sepsis.  I feel patient’s risk for adverse outcomes and need for care warrant INPATIENT evaluation and I predict the patient’s care encounter to likely last beyond 2 midnights.    Elaine New DO  02/20/21      Electronically signed by Elaine New DO at 02/20/21 0059       Oxygen Therapy (last day)     Date/Time   SpO2   Device (Oxygen Therapy)   Flow (L/min)   Oxygen Concentration (%)   ETCO2 (mmHg)    03/05/21 1000   94   --   --   --   --    03/05/21 0900   92   --   --   --   --    03/05/21 0800   95   --   --   --   --    03/05/21 0759   91   --   --   --   --    03/05/21 0700   91   --   --   --   --    03/05/21 0600   90   humidified;nasal cannula   3   --   --    03/05/21 0400   94   humidified;nasal cannula   3   --   --    03/05/21 0200   91   humidified;nasal cannula   3   --   --    03/05/21 0000   90   humidified;nasal cannula   3   --   --    03/04/21 2200   91   humidified;nasal cannula   3   --   --    03/04/21 2000   92   humidified;nasal cannula   3   --   --    03/04/21 1955   92   --   --   --   --    03/04/21 1800   --   nasal cannula   4   --   --    03/04/21 1600   90   nasal cannula   4   --   --    03/04/21 1400   93   nasal cannula   4   --   --    03/04/21 1200   90   nasal cannula   4   --   --    03/04/21 1100   91   --   --   --   --    03/04/21 1000   90   nasal cannula   4   --    --    03/04/21 0937   94   nasal cannula   4   --   --    03/04/21 0900   92   --   --   --   --    03/04/21 0710   94   nasal cannula;humidified   4   --   --    03/04/21 0700   93   --   --   --   --    03/04/21 0600   (!) 86   humidified;nasal cannula   4   --   --    03/04/21 0400   90   humidified;nasal cannula   4   --   --    03/04/21 0323   91   --   --   --   --    03/04/21 0200   93   humidified;nasal cannula   4   --   --    03/04/21 0000   91   humidified;nasal cannula   5   --   --

## 2021-03-05 NOTE — PLAN OF CARE
Goal Outcome Evaluation:  Plan of Care Reviewed With: patient     Outcome Summary: wound vac changed to allow for d/c home with home health f/u. no issues noted.

## 2021-03-05 NOTE — PROGRESS NOTES
Case Management Discharge Note      Final Note: Spoke with patient at bedside, she is agreeable for discharge home with [facility].  Awaiting insurance approval for wound vac.  [facility] arranged.  Bariatric walker and oxygen supplied by [facility].  No other dc needs, son to transport.         Selected Continued Care - Admitted Since 2/19/2021     Destination    No services have been selected for the patient.              Durable Medical Equipment     Service Provider Selected Services Address Phone Fax Patient Preferred    [facility] [place]  Durable Medical Equipment [address] [address] [phone] --          Dialysis/Infusion    No services have been selected for the patient.              Home Medical Care     Service Provider Selected Services Address Phone Fax Patient Preferred    [facility]  [facility] [address] [facility] [address] [phone] --          Therapy    No services have been selected for the patient.              Community Resources    No services have been selected for the patient.                       Final Discharge Disposition Code: 06 - home with home health care

## 2021-03-05 NOTE — THERAPY WOUND CARE TREATMENT
Acute Care - Wound/Debridement Treatment Note  [facility]     Patient Name: Raquel Carranza  : 1955  MRN: [id number]  Today's Date: 3/5/2021                Admit Date: 2021    Visit Dx:    ICD-10-CM ICD-9-CM   1. Sepsis, due to unspecified organism, unspecified whether acute organ dysfunction present (CMS/[facility])  A41.9 038.9     995.91   2. Left leg cellulitis  L03.116 682.6   3. Abscess of left thigh  L02.416 682.6   4. History of deep vein thrombosis of lower extremity  Z86.718 V12.51   5. Sepsis due to cellulitis (CMS/[facility])  L03.90 682.9    A41.9 995.91   6. 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 rheumatoid factor (CMS/[facility])  M05.79 714.0   7. Hypoxia  R09.02 799.02   8. 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 Acute pulmonary edema (CMS/[facility])  J81.0 518.4       Patient Active Problem List   Diagnosis   • History of deep vein thrombosis of lower extremity   • Special screening for malignant neoplasms, colon   • Sepsis due to cellulitis (CMS/[facility])   • Arrhythmia   • Hypertension   • 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 rheumatoid factor (CMS/HCC)   • Sjogren's disease (CMS/HCC)   • Acquired hypothyroidism   • CKD (chronic kidney disease)   • Hypoxia   • Word finding difficulty   • Obesity, morbid, BMI 50 or higher (CMS/HCC)   • Obesity hypoventilation syndrome (CMS/HCC)   • Pulmonary edema        Past Medical History:   Diagnosis Date   • Abnormal glucose    • Acquired hypothyroidism    • Anxiety    • Arrhythmia    • Arthritis    • Bilateral edema of lower extremity    • Chronic joint pain    • Hypertension    • Iron deficiency anemia    • Irritable bowel syndrome    • 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 rheumatoid factor (CMS/HCC)    • Sjogren's disease (CMS/HCC)    • Thrombosis of right saphenous vein    •  Vitamin D deficiency         Past Surgical History:   Procedure Laterality Date   • APPENDECTOMY     • HERNIA REPAIR     • LEG EXCISION LESION/CYST Left 2/26/2021    Procedure: INCISION AND DRAINAGE LEFT THIGH ABCESS;  Surgeon: Noe Ken MD;  Location:  [facility];  Service: General;  Laterality: Left;   • LEG EXCISION LESION/CYST Left 2/27/2021    Procedure: WOUND EXPLORATION LEFT THIGH FOR CONTROL OF BLEEDING;  Surgeon: Sreedhar Bobby MD;  Location:  [facility];  Service: General;  Laterality: Left;           Wound 02/26/21 Left anterior thigh Incision (Active)   Dressing Appearance intact;moist drainage 03/05/21 1530   Closure KARLY 03/05/21 0810   Base moist;pink;red;slough 03/05/21 1530   Periwound intact;blanchable 03/05/21 1530   Periwound Temperature warm 03/05/21 1530   Periwound Skin Turgor firm 03/05/21 1530   Edges irregular 03/05/21 1530   Drainage Characteristics/Odor serosanguineous 03/05/21 1530   Drainage Amount moderate 03/05/21 1530   Care, Wound irrigated with;sterile normal saline;negative pressure wound therapy 03/05/21 1530   Periwound Care dry periwound area maintained 03/04/21 2000   Wound Output (mL) 25 03/05/21 1530       NPWT (Negative Pressure Wound Therapy) 03/04/21 1345 L thigh (Active)   Therapy Setting continuous therapy 03/05/21 1530   Dressing foam, black;foam, white 03/05/21 1530   Pressure Setting 150 mmHg 03/05/21 1530   Sponges Inserted 2 03/05/21 1530   Sponges Removed 2 03/05/21 1530   Finger sweep complete Yes 03/05/21 1530         WOUND DEBRIDEMENT                        PT Assessment (last 12 hours)      PT Evaluation and Treatment     Row Name 03/05/21 1530          Physical Therapy Time and Intention    Subjective Information  complains of;weakness;fatigue;pain  -MF     Document Type  therapy note (daily note);wound care  -MF     Mode of Treatment  individual therapy;physical therapy  -     Row Name 03/05/21 1530          Pain    Additional Documentation   Pain Scale: FACES Pre/Post-Treatment (Group)  -     Row Name 03/05/21 1530          Pain Scale: Word Pre/Post-Treatment    Pain Intervention(s)  Repositioned  -     Row Name 03/05/21 1530          Pain Scale: FACES Pre/Post-Treatment    Pain: FACES Scale, Pretreatment  0-->no hurt  -     Posttreatment Pain Rating  2-->hurts little bit  -     Pre/Posttreatment Pain Comment  5/10 pain with dressing change  -     Row Name 03/05/21 1530          Wound 02/26/21 Left anterior thigh Incision    Wound - Properties Group Placement Date: 02/26/21  -AO Present on Hospital Admission: N  -AO Side: Left  -AO Orientation: anterior  -AO Location: thigh  -AO Primary Wound Type: Incision  -AO    Dressing Appearance  intact;moist drainage  -MF     Base  moist;pink;red;slough  -MF     Periwound  intact;blanchable  -     Periwound Temperature  warm  -     Periwound Skin Turgor  firm  -MF     Edges  irregular  -MF     Drainage Characteristics/Odor  serosanguineous  -MF     Drainage Amount  moderate  -     Care, Wound  irrigated with;sterile normal saline;negative pressure wound therapy  -     Wound Output (mL)  25  -MF     Retired Wound - Properties Group Date first assessed: 02/26/21  -AO Present on Hospital Admission: N  -AO Side: Left  -AO Location: thigh  -AO Primary Wound Type: Incision  -AO    Row Name 03/05/21 1530          NPWT (Negative Pressure Wound Therapy) 03/04/21 1345 L thigh    NPWT (Negative Pressure Wound Therapy) - Properties Group Placement Date: 03/04/21  - Placement Time: 1345  -MF Location: L thigh  -MF Additional Comments: rehab wound vac #7  -MF    Therapy Setting  continuous therapy  -MF     Dressing  foam, black;foam, white  -MF     Pressure Setting  150 mmHg  -MF     Sponges Inserted  2 1 black 1 white  -MF     Sponges Removed  2 1 black 1 white  -MF     Finger sweep complete  Yes  -     Retired NPWT (Negative Pressure Wound Therapy) - Properties Group Placement Date: 03/04/21  -MF  Placement Time: 1345  -MF Location: L thigh  - Additional Comments: rehab wound vac #7  -MF    Row Name 03/05/21 1530          Coping    Observed Emotional State  calm;cooperative  -     Verbalized Emotional State  acceptance  -     Trust Relationship/Rapport  care explained  -     Row Name 03/05/21 1530          Plan of Care Review    Plan of Care Reviewed With  patient  -MF     Outcome Summary  wound vac changed to allow for d/c home with home health f/u. no issues noted.   -     Row Name 03/05/21 1530          Positioning and Restraints    Pre-Treatment Position  in bed  -     Post Treatment Position  bed  -MF     In Bed  supine;call light within reach  -       User Key  (r) = Recorded By, (t) = Taken By, (c) = Cosigned By    Initials Name Provider Type    Lew Chin, PT Physical Therapist    Esperanza Calderón, RN Registered Nurse        Physical Therapy Education                 Title: PT OT SLP Therapies (Done)     Topic: Physical Therapy (Done)     Point: Mobility training (Done)     Learning Progress Summary           Patient Acceptance, E, VU by VG at 3/5/2021 1124    Acceptance, E, NR by EJ at 3/3/2021 1442    Acceptance, E, NR by EJ at 3/1/2021 1521    Acceptance, E, NR by EJ at 2/26/2021 1107    Acceptance, E, NR by AS at 2/23/2021 1129    Acceptance, E, NR by EJ at 2/22/2021 1648    Acceptance, E, NR by LM at 2/20/2021 1038                   Point: Home exercise program (Done)     Learning Progress Summary           Patient Acceptance, E, VU by VG at 3/5/2021 1124    Acceptance, E, NR by EJ at 3/3/2021 1442    Acceptance, E, NR by EJ at 3/1/2021 1521    Acceptance, E, NR by EJ at 2/26/2021 1107    Acceptance, E, NR by AS at 2/23/2021 1129    Acceptance, E, NR by EJ at 2/22/2021 1648                   Point: Precautions (Done)     Learning Progress Summary           Patient Acceptance, E, VU by VG at 3/5/2021 1124    Acceptance, E, NR by EJ at 3/3/2021 1442    Acceptance, E, NR  by WAQAR at 3/1/2021 1521    Acceptance, E, NR by EJ at 2/26/2021 1107    Acceptance, E, NR by AS at 2/23/2021 1129    Acceptance, E, NR by EJ at 2/22/2021 1648    Acceptance, E, NR by LM at 2/20/2021 1038                               User Key     Initials Effective Dates Name Provider Type [facility] 11/20/18 -  Donya Foreman, PT Physical Therapist PT    AS 06/22/15 -  Araceli Saravia, PTA Physical Therapy Assistant PT     07/24/19 -  Megan Peralta, PT Physical Therapist PT    VG 05/29/18 -  Jerilyn Hunt, PT Physical Therapist PT                Recommendation and Plan  Anticipated Discharge Disposition (PT): skilled nursing facility  Planned Therapy Interventions (PT): balance training, bed mobility training, gait training, home exercise program, motor coordination training, neuromuscular re-education, patient/family education, postural re-education, ROM (range of motion), stair training, strengthening, stretching, transfer training  Therapy Frequency (PT): daily  Plan of Care Reviewed With: patient           Outcome Summary: wound vac changed to allow for d/c home with home health f/u. no issues noted.   Plan of Care Reviewed With: patient            Time Calculation  PT Charges     Row Name 03/05/21 1530 03/05/21 1051          Time Calculation    Start Time  1530  -  1051  -VG     PT Received On  --  03/05/21  -VG     PT Goal Re-Cert Due Date  03/13/21  -  03/13/21  -VG        Time Calculation- PT    Total Timed Code Minutes- PT  --  25 minute(s)  -VG        Timed Charges    93727 - Gait Training Minutes   --  25  -VG       User Key  (r) = Recorded By, (t) = Taken By, (c) = Cosigned By    Initials Name Provider Type     Lew La, PT Physical Therapist    VG Jerilyn Hunt, PT Physical Therapist            Therapy Charges for Today     Code Description Service Date Service Provider Modifiers Qty    63747174274 HC PT RE-EVAL ESTABLISHED PLAN 2 3/4/2021 Lew La, PT GP  1    71238838077 HC PT NEG PRESS WOUND TO 50SQCM DME3 3/4/2021 Lew La, PT GP 1    61465997270 HC PT NEG PRESS WOUND TO 50SQCM DME3 3/5/2021 Lew La, PT GP 1            PT G-Codes  Outcome Measure Options: AM-PAC 6 Clicks Daily Activity (OT)  AM-PAC 6 Clicks Score (PT): 23  AM-PAC 6 Clicks Score (OT): 20       Lew La, PT  3/5/2021

## 2021-03-05 NOTE — DISCHARGE SUMMARY
[facility] Medicine Services  DISCHARGE SUMMARY    Patient Name: Raquel Carranza  : 1955  MRN: [id number]    Date of Admission: 2021  6:10 PM  Date of Discharge:  21  Primary Care Physician: Sommer Paniagua MD    Consults     Date and Time Order Name Status Description    2021 0849 Inpatient Neurology Consult General Completed     2021 0753 Inpatient Infectious Diseases Consult Completed     2021 0753 Inpatient Neurology Consult General Completed     2021 0052 Inpatient General Surgery Consult Completed     2021 1812 Inpatient Neurology Consult Stroke Completed           Hospital Course     Presenting Problem:   Sepsis due to cellulitis (CMS/HCC) [L03.90, A41.9]  Sepsis due to cellulitis (CMS/HCC) [L03.90, A41.9]    Active Hospital Problems    Diagnosis  POA   • **Sepsis due to cellulitis (CMS/HCC) [L03.90, A41.9]  Yes   • Obesity hypoventilation syndrome (CMS/HCC) [E66.2]  Yes   • Arrhythmia [I49.9]  Yes   • Hypertension [I10]  Yes   • 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 rheumatoid factor (CMS/HCC) [M05.79]  Yes   • Sjogren's disease (CMS/[facility]) [M35.00]  Yes   • Acquired hypothyroidism [E03.9]  Yes   • CKD (chronic kidney disease) [N18.9]  Yes   • Hypoxia [R09.02]  Yes   • Word finding difficulty [R47.89]  Yes   • History of deep vein thrombosis of lower extremity [Z86.718]  Not Applicable      Resolved Hospital Problems    Diagnosis Date Resolved POA   • RAFA (acute kidney injury) (CMS/HCC) [N17.9] 2021 Yes   • Altered mental status [R41.82] 2021 Yes          Hospital Course:  Raquel Carranza is a 65 y.o. female with h/o hypothyroidism, HTN, RA, Sjogren's disease, DVT on Xarelto, MAHESH/CPAP, CKD, and arrhythmia who presented with sepsis due to Left groin cellulitis.  Since admission, pt's L groin site has progressed to an abscess. She went to the OR with Dr. Ken on 2/26 for I&D.  Post op, pt had significant  bleeding from wound site, so was taken back to the OR 2/27 with Dr. Bobby s/p electrocautery or suture ligation of multiple bleeding sites as well as I&D of wound as more necrotic tissue was noted.      Sepsis/L groin cellulitis/abscess  --ID following, and surgery followed   --ultrasound and clinical exam on 2/25 c/w abscess. S/p I&D by Dr. Ken 2/26 and repeat I&D as well as control of bleeding sites by Dr. Bobby 2/27  --wound cultures with Prevotella  - ID managened antibiotics and she is currently on flagyl 500 mg TID and cefdinir 300 mg BID on discharge  - Wound vac was placed 3/4 and will be arranged for home health to do dressing changes. Close follow up in 1 week with Dr. Chirinos and Dr. Ken in 1 month   -  ID telehealth follow up arranged with home health for ID to assess wound --- reviewed this with CM      Funguria  --from UCx obtained 2/25   --started on Fluconazole 2/26 by ID; will complete 1 more dose on discharge       Acute hypoxic respiratory failure  Likely obesity hypoventilation   Pulmonary edema - improved   --ECHO with normal EF  --hypoxia likely due to OHS/MAHESH  - CXR with pulm vascular engorgement; she did receive diuresis  --complicated by body habitus  - Patient was initially on 5L and O2 gradually weaned down and will be discharged on 3L; likely continued improvement with more mobility at home      Probable CKD  --stable      Sjogren's/RA  --not actively on any immunosuppression     Hx of DVT  --on Xarelto at home, initially on Heparin gtt here due to ? Need for surgery then transitioned back to Xarelto  -- will continue xarelto on discharge      AMS-resolved  --new visual disturbance/dizziness  --neurology reconsulted, appreciate their assistance. They have now signed off.   -- likely due to medication/infection      Left flank pain  --resolved, checked UA/UCx on 2/25 and findings as mentioned above     Discharge Follow Up Recommendations for outpatient labs/diagnostics:  PCP   [facility] 1 week  [facility] Dr. Martinez telehealth 1 week   [facility] 1 month Dr. Florez   Continued wound care with home health     Day of Discharge     HPI:   States she is doing ok today. Very anxious to go home. Reviewed wound vac and home O2 and she is agreeable to both. Reviewed follow ups. Patient feels comfortable with discharge today.     Review of Systems  Gen- No fevers, chills  CV- No chest pain, palpitations  Resp- No cough, dyspnea  GI- No N/V/D, abd pain    Vital Signs:   Temp:  [97.5 °F (36.4 °C)-98.8 °F (37.1 °C)] 98.1 °F (36.7 °C)  Heart Rate:  [57-68] 63  Resp:  [18] 18  BP: (100-116)/(53-66) 116/53     Physical Exam:  Constitutional: No acute distress, awake, alert; obese   HENT: NCAT, mucous membranes moist  Respiratory: Clear to auscultation bilaterally, respiratory effort normal   Cardiovascular: RRR, no murmurs, rubs, or gallops  Gastrointestinal: Positive bowel sounds, soft, nontender, nondistended  Musculoskeletal: trace bilateral ankle edema  Psychiatric: Appropriate affect, cooperative  Neurologic: Oriented x 3, strength symmetric in all extremities, Cranial Nerves grossly intact to confrontation, speech clear  Skin: No rashes    Pertinent  and/or Most Recent Results     LAB RESULTS:      Lab 03/03/21  0502 03/02/21  0452 03/01/21  0550 02/28/21  0442 02/27/21  1952 02/27/21  0525   WBC 6.89 8.70 9.93 12.63*  --  11.70*   HEMOGLOBIN 12.5 11.3* 11.2* 12.2 13.2 15.3   HEMATOCRIT 41.2 37.2 36.8 40.4 43.0 50.2*   PLATELETS 256 230 224 227  --  223   .6* 109.7* 107.6* 107.4*  --  109.1*         Lab 03/03/21  0502 03/02/21  0452 03/01/21  0550 02/28/21  0442 02/27/21  0525   SODIUM 139 141 139 139 139   POTASSIUM 4.1 4.2 4.0 4.4 4.2   CHLORIDE 102 103 102 103 102   CO2 32.0* 32.0* 31.0* 29.0 31.0*   ANION GAP 5.0 6.0 6.0 7.0 6.0   BUN 10 8 7* 10 10   CREATININE 0.71 0.73 0.70 0.74 0.75   GLUCOSE 94 98 94 90 82   CALCIUM 8.8 8.7 8.6 8.8 9.3                         Brief Urine Lab  Results  (Last result in the past 365 days)      Color   Clarity   Blood   Leuk Est   Nitrite   Protein   CREAT   Urine HCG        02/25/21 1611 Dark Yellow Clear Negative Small (1+) Negative Trace             Microbiology Results (last 10 days)     Procedure Component Value - Date/Time    Anaerobic Culture - Tissue, Thigh, Left [[id number]] Collected: 02/27/21 1402    Lab Status: Final result Specimen: Tissue from Thigh, Left Updated: 03/04/21 0734     Anaerobic Culture No anaerobes isolated at 5 days    Fungus Culture - Tissue, Thigh, Left [[id number]] Collected: 02/27/21 1402    Lab Status: Preliminary result Specimen: Tissue from Thigh, Left Updated: 03/04/21 1500     Fungus Culture No fungus isolated at less than 1 week    Tissue / Bone Culture - Tissue, Thigh, Left [[id number]] Collected: 02/27/21 1402    Lab Status: Final result Specimen: Tissue from Thigh, Left Updated: 03/02/21 0742     Tissue Culture No growth at 3 days     Gram Stain Many (4+) WBCs seen      No organisms seen    AFB Culture - Tissue, Thigh, Left [[id number]] Collected: 02/27/21 1402    Lab Status: Preliminary result Specimen: Tissue from Thigh, Left Updated: 03/04/21 1500     AFB Culture No AFB isolated at less than 1 week     AFB Stain No acid fast bacilli seen on concentrated smear    Anaerobic Culture - Tissue, Thigh, Left [[id number]]  (Abnormal) Collected: 02/26/21 1524    Lab Status: Final result Specimen: Tissue from Thigh, Left Updated: 03/01/21 1246     Anaerobic Culture Prevotella loescheii    Wound Culture - Swab, Thigh, Left [[id number]] Collected: 02/26/21 1524    Lab Status: Final result Specimen: Swab from Thigh, Left Updated: 03/01/21 0620     Wound Culture No growth at 3 days     Gram Stain No organisms seen      Many (4+) WBCs per low power field      Rare (1+) Epithelial cells per low power field    Urine Culture - Urine, Urine, Clean Catch [[id number]]  (Abnormal) Collected: 02/25/21 1611    Lab Status: Final result Specimen:  Urine, Clean Catch Updated: 02/26/21 1300     Urine Culture Yeast isolated     Comment: No further workup.             Us Nonvascular Extremity Limited    Result Date: 2/24/2021  EXAMINATION: US NONVASCULAR EXTREMITY LIMITED - 02/24/2021  INDICATION: A41.9-Sepsis, unspecified organism; L03.116-Cellulitis of left lower limb. Pain, swelling and redness x 2 weeks.  TECHNIQUE: Directed ultrasound to the area indicated by the patient in the medial/proximal left thigh and groin region.  COMPARISON: CT left lower extremity 02/19/2021.  FINDINGS: Patient history indicates pain, swelling and redness for approximately two weeks in the medial proximal left leg, fever. Today's study shows a branching pattern of subcutaneous edema within elongated area up to 6.2 x 1.4 x 5.5 cm that may represent an irregular developing abscess. Volume calculation estimates approximately 25 ml. There are other smaller areas of branching low attenuation change, perhaps a small adjacent area of subcutaneous abscess/edema.  Color Doppler flow does not reveal significant hyperemia. None of the echo poor structures appear to represent enlarged vessels.      Probable developing subcutaneous abscess, still not well-defined, in the medial proximal left thigh, maximal dimensions up to 6.2 x 1.4 x 5.5 cm.  DICTATED:   02/24/2021 EDITED/ls :   02/24/2021   This report was finalized on 2/24/2021 11:05 PM by Dr. Brock Morris MD.        Results for orders placed in visit on 03/10/17   SCANNED VASCULAR STUDIES       Results for orders placed in visit on 03/10/17   SCANNED VASCULAR STUDIES       Results for orders placed during the hospital encounter of 02/19/21   Adult Transthoracic Echo Complete W/ Cont if Necessary Per Protocol (With Agitated Saline)    Narrative · Left ventricular ejection fraction appears to be 61 - 65%. Left   ventricular systolic function is normal.  · Left ventricular diastolic function was normal.          Plan for Follow-up of Pending  Labs/Results:   Pending Labs     Order Current Status    AFB Culture - Tissue, Thigh, Left Preliminary result    Fungus Culture - Tissue, Thigh, Left Preliminary result        Discharge Details        Discharge Medications      New Medications      Instructions Start Date   aspirin 81 MG chewable tablet   81 mg, Oral, Daily   Start Date: March 6, 2021     cefdinir 300 MG capsule  Commonly known as: OMNICEF   300 mg, Oral, Every 12 Hours Scheduled      fluconazole 200 MG tablet  Commonly known as: DIFLUCAN   200 mg, Oral, Every 24 Hours   Start Date: March 6, 2021     metroNIDAZOLE 500 MG tablet  Commonly known as: FLAGYL   500 mg, Oral, Every 8 Hours Scheduled         Changes to Medications      Instructions Start Date   oxyCODONE-acetaminophen 5-325 MG per tablet  Commonly known as: PERCOCET  What changed: See the new instructions.   0.5 tablets, Oral, Every 4 Hours PRN         Continue These Medications      Instructions Start Date   allopurinol 300 MG tablet  Commonly known as: ZYLOPRIM   300 mg, Oral, Daily      baclofen 20 MG tablet  Commonly known as: LIORESAL   20 mg, Oral, 3 Times Daily      betamethasone dipropionate 0.05 % cream   Topical, 2 Times Daily      clonazePAM 0.5 MG tablet  Commonly known as: KlonoPIN   0.5 mg, Oral, 2 Times Daily PRN      DULoxetine 30 MG capsule  Commonly known as: CYMBALTA   30 mg, Oral, Daily      famotidine 20 MG tablet  Commonly known as: PEPCID   20 mg, Oral, 2 Times Daily      ferrous sulfate 325 (65 FE) MG tablet   325 mg, Oral, Daily With Breakfast      folic acid 1 MG tablet  Commonly known as: FOLVITE   1 mg, Oral, Daily      gabapentin 300 MG capsule  Commonly known as: NEURONTIN   300 mg, Oral, 2 Times Daily      levothyroxine 137 MCG tablet  Commonly known as: SYNTHROID, LEVOTHROID   TAKE 1 TABLET BY MOUTH DAILY      metoprolol succinate XL 25 MG 24 hr tablet  Commonly known as: TOPROL-XL   25 mg, Oral, Daily      mupirocin 2 % ointment  Commonly known as:  BACTROBAN   1 application, Topical, 3 Times Daily      Rivaroxaban tablet therapy pack starter pack  Commonly known as: Xarelto Starter Pack   Take as directed: 15 mg by mouth twice daily for 21 days, then 20 mg once daily.      VITAMIN D2 PO   1.25 mg, Oral, Weekly         Stop These Medications    clindamycin 300 MG capsule  Commonly known as: CLEOCIN     raNITIdine 150 MG tablet  Commonly known as: ZANTAC     spironolactone 50 MG tablet  Commonly known as: ALDACTONE            Allergies   Allergen Reactions   • Ace Inhibitors    • Penicillins    • Adacel [Tetanus-Diphth-Acell Pertussis]      Preservative in the vaccine   • Lisinopril    • Zostavax [Zoster Vaccine Live]      Preservative in the vaccine   • Thimerosal Rash         Discharge Disposition:  Home-Health Care Svc    Diet:  Hospital:  Diet Order   Procedures   • Diet Regular; Cardiac, Consistent Carbohydrate       Activity:  Activity Instructions     Activity as Tolerated            Restrictions or Other Recommendations:         CODE STATUS:    Code Status and Medical Interventions:   Ordered at: 02/19/21 2225     Code Status:    CPR     Medical Interventions (Level of Support Prior to Arrest):    Full       No future appointments.    Additional Instructions for the Follow-ups that You Need to Schedule     Ambulatory Referral to Home Health   As directed      Face to Face Visit Date: 3/4/2021    Follow-up provider for Plan of Care?: I treated the patient in an acute care facility and will not continue treatment after discharge.    Follow-up provider: AUSTIN DIAZ [[id number]]    Reason/Clinical Findings: Hypoxia, sepsis, cellulitis    Describe mobility limitations that make leaving home difficult: Rheumatoid arthritis, weakness, decreased endurance    Nursing/Therapeutic Services Requested: Skilled Nursing Physical Therapy Occupational Therapy    Skilled nursing orders: Medication education Wound vac application and instruction Wound care dressing/changes     Instructions: Wound vac dressing changes 3x per week    PT orders: Therapeutic exercise Strengthening Home safety assessment    Occupational orders: Activities of daily living Energy conservation Strengthening Home safety assessment         Discharge Follow-up with PCP   As directed       Currently Documented PCP:    Sommer Paniagua MD    PCP Phone Number:    [phone]     Follow Up Details: PCP Dr. Paniagua 1 week         [facility]   As directed      [facility]         Discharge Follow-up with Specified Provider: [facility] Dr. Ken 1 month   As directed      To: [facility] Dr. Ken 1 month                     Siena Shell DO  03/05/21      Time Spent on Discharge:  I spent  35 minutes on this discharge activity which included: face-to-face encounter with the patient, reviewing the data in the system, coordination of the care with the nursing staff as well as consultants, documentation, and entering orders.

## 2021-03-05 NOTE — PROGRESS NOTES
"                  Clinical Nutrition       Reason for Visit:   Follow-up protocol    Patient Name: Raquel Carranza  YOB: 1955  MRN: [id number]  Date of Encounter: 03/05/21 12:10 EST  Admission date: 2/19/2021      Nutrition Assessment   Assessment       Admission diagnosis  Sepsis due to cellulitis (CMS/HCC)    Additional applicable diagnosis/conditions/procedures this adm:  Sepsis/L groin cellulitis/abscess  Acute hypoxic respiratory failure  Probable CKD  Altered mental status-resolved   Word finding difficulty  (2/26) s/p I&D L thigh abscess  Funguria  WOC (2/27) - 1.5 x 5 x 9 cm subcutaneous tissue exposed  (2/27) s/p wound exploration/control of bleeding  Wound vac in place to left groin    Applicable PMH/PSxH:   Hypertension  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 rheumatoid factor (CMS/HCC)  Sjogren's disease (CMS/HCC)  Acquired hypothyroidism      PMH: She  has a past medical history of Abnormal glucose, Acquired hypothyroidism, Anxiety, Arrhythmia, Arthritis, Bilateral edema of lower extremity, Chronic joint pain, Hypertension, Iron deficiency anemia, Irritable bowel syndrome, 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 rheumatoid factor (CMS/HCC), Sjogren's disease (CMS/HCC), Thrombosis of right saphenous vein, and Vitamin D deficiency.   PSxH: She  has a past surgical history that includes Appendectomy; Hernia repair; Leg Excision Lesion/Cyst (Left, 2/26/2021); and Leg Excision Lesion/Cyst (Left, 2/27/2021).       Reported/Observed/Food/Nutrition Related History:      Patient tolerating cardiac regular diet well with good oral intake. Per available PO data, patient consuming ~71% of past meals. Noted previous surgeries for wound, WOC following. Increased protein needs to promote proper wound healing.      Anthropometrics     Height: 172.7 cm (68\")  Last filed wt: Weight: (!) 167 kg (368 lb 2.7 oz) (02/19/21 1833)  Weight Method: Bed scale    BMI: BMI " "(Calculated): 56  Obese Class III extreme obesity: > or equal to 40kg/m2    Ideal Body Weight (IBW) (kg): 64.15    Last 15 Recorded Weights  View Complete Flowsheet  Weight Weight (kg) Weight (lbs) Weight Method   2/19/2021 167 kg 368 lb 2.7 oz Bed scale   5/23/2017 164.202 kg 362 lb -   9/6/2016 154.223 kg 340 lb -         Weight Change   UBW:  Weight change:   % wt change:   Time frame of weight loss:     Labs reviewed     Results from last 7 days   Lab Units 03/03/21  0502 03/02/21  0452 03/01/21  0550   GLUCOSE mg/dL 94 98 94   BUN mg/dL 10 8 7*   CREATININE mg/dL 0.71 0.73 0.70   SODIUM mmol/L 139 141 139   CHLORIDE mmol/L 102 103 102   POTASSIUM mmol/L 4.1 4.2 4.0           Invalid input(s): PLAT          Lab Results   Lab Value Date/Time    HGBA1C 5.70 (H) 02/20/2021 0402       Medications reviewed   Pertinent:  Dulcolax, colace, FeSo4, MVI, miralax, Vit D3      Intake/Ouptut 24 hrs (7:00AM - 6:59 AM)     Intake & Output (last day)       03/04 0701 - 03/05 0700 03/05 0701 - 03/06 0700    P.O. 600     IV Piggyback      Total Intake(mL/kg) 600 (3.6)     Urine (mL/kg/hr) 350 (0.1)     Stool 0     Wound 0     Total Output 350     Net +250           Urine Unmeasured Occurrence 3 x     Stool Unmeasured Occurrence 3 x         Needs Assessment (3/2)     Height used  172.7 cm (68\")   Weight used  167 kg (368 lb 2.7 oz) - bed scale 2/19/21   IBW  63.4 kg (140 lbs)     Estimated need Method/Equation used Result    Energy/Calorie need  ~2000 - 01403 kcals/d  14 kcal/kg act   30 kcal/kg IBW  2338 kcal   1902 kcal             Protein   ~167 g/day  2.0 g/kg IBW   1.0 g/kg actBW  127 g   167 g                 Current Nutrition Prescription     PO: Diet Regular; Cardiac, Consistent Carbohydrate  Nutrition Supplements:   Premier Protein x2/day    Intake: 79% x 8 meals        Nutrition Diagnosis     3/2  Problem Increased nutrient needs   Etiology Wound healing/skin integrity   Signs/Symptoms S/p L thigh I&D and wound " exploration   Status: resolved-ONS added    2/26 updated 3/2, 3/5  Problem Inadequate oral intake   Etiology ?mental status, clinical status   Signs/Symptoms 44% x 7 meals    Status: resolved    Nutrition Intervention     1.  Follow treatment progress, Care plan reviewed      Goal:   General: Nutrition support treatment  PO: Meet estimated needs       Monitoring/Evaluation:   Per protocol, PO intake, Supplement intake, Pertinent labs, Skin status    Yamilka Hameed, NELLIE, LD, CNSC  Time Spent: 30 minutes

## 2021-03-05 NOTE — PLAN OF CARE
Goal Outcome Evaluation:  Plan of Care Reviewed With: patient     Outcome Summary: Pt rested well throughout the night. Wound vac in place to left groin. VSS. Pt weaned to 3 L NC with sats >90%. No complaints from pt at this time. Pt refusing rehab, plan is home with home health.

## 2021-03-05 NOTE — THERAPY TREATMENT NOTE
Patient Name: Raquel Carranza  : 1955    MRN: [id number]                              Today's Date: 3/5/2021       Admit Date: 2021    Visit Dx:     ICD-10-CM ICD-9-CM   1. Sepsis, due to unspecified organism, unspecified whether acute organ dysfunction present (CMS/HCC)  A41.9 038.9     995.91   2. Left leg cellulitis  L03.116 682.6   3. Abscess of left thigh  L02.416 682.6   4. History of deep vein thrombosis of lower extremity  Z86.718 V12.51   5. Sepsis due to cellulitis (CMS/HCC)  L03.90 682.9    A41.9 995.91   6. 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 rheumatoid factor (CMS/HCC)  M05.79 714.0   7. Hypoxia  R09.02 799.02     Patient Active Problem List   Diagnosis   • History of deep vein thrombosis of lower extremity   • Special screening for malignant neoplasms, colon   • Sepsis due to cellulitis (CMS/HCC)   • RAFA (acute kidney injury) (CMS/HCC)   • Arrhythmia   • Hypertension   • 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 rheumatoid factor (CMS/HCC)   • Sjogren's disease (CMS/HCC)   • Acquired hypothyroidism   • CKD (chronic kidney disease)   • Altered mental status   • Hypoxia   • Word finding difficulty     Past Medical History:   Diagnosis Date   • Abnormal glucose    • Acquired hypothyroidism    • Anxiety    • Arrhythmia    • Arthritis    • Bilateral edema of lower extremity    • Chronic joint pain    • Hypertension    • Iron deficiency anemia    • Irritable bowel syndrome    • 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 rheumatoid factor (CMS/HCC)    • Sjogren's disease (CMS/HCC)    • Thrombosis of right saphenous vein    • Vitamin D deficiency      Past Surgical History:   Procedure Laterality Date   • APPENDECTOMY     • HERNIA REPAIR     • LEG EXCISION LESION/CYST Left 2021    Procedure: INCISION AND DRAINAGE LEFT THIGH ABCESS;  Surgeon: Noe Ken MD;  Location: [facility];  Service: General;  Laterality: Left;   • LEG  EXCISION LESION/CYST Left 2/27/2021    Procedure: WOUND EXPLORATION LEFT THIGH FOR CONTROL OF BLEEDING;  Surgeon: Sreedhar Bobby MD;  [facility];  Service: General;  Laterality: Left;     General Information     Row Name 03/05/21 1119          Physical Therapy Time and Intention    Document Type  therapy note (daily note)  -VG     Mode of Treatment  individual therapy;physical therapy  -VG     Row Name 03/05/21 1119          General Information    Existing Precautions/Restrictions  fall;oxygen therapy device and L/min;other (see comments) wound vac  -VG     Row Name 03/05/21 1119          Cognition    Orientation Status (Cognition)  oriented x 4  -VG       User Key  (r) = Recorded By, (t) = Taken By, (c) = Cosigned By    Initials Name Provider Type    VG Jerilyn Hunt, PT Physical Therapist        Mobility     Row Name 03/05/21 1120          Bed Mobility    Comment (Bed Mobility)  UIC  -VG     Row Name 03/05/21 1120          Transfers    Comment (Transfers)  Good safety awareness.  -VG     Row Name 03/05/21 1120          Sit-Stand Transfer    Sit-Stand Parker (Transfers)  modified independence  -VG     Assistive Device (Sit-Stand Transfers)  walker, front-wheeled;bariatric  -VG     Row Name 03/05/21 1120          Gait/Stairs (Locomotion)    Parker Level (Gait)  modified independence  -VG     Assistive Device (Gait)  walker, front-wheeled  -VG     Distance in Feet (Gait)  150  -VG     Deviations/Abnormal Patterns (Gait)  festinating/shuffling;gait speed decreased  -VG     Comment (Gait/Stairs)  Cues for PLB and pacing. Distance limited by fatigue, weakness. SpO2 desat 86% on RA, recovered to 94% within 2 min on 3LO2NC. Good safety awareness.  -VG       User Key  (r) = Recorded By, (t) = Taken By, (c) = Cosigned By    Initials Name Provider Type    VG Jerilyn Hunt, PT Physical Therapist        Obj/Interventions    No documentation.       Goals/Plan    No documentation.       Clinical  Impression     Row Name 03/05/21 1122          Pain Scale: Numbers Pre/Post-Treatment    Pretreatment Pain Rating  0/10 - no pain  -VG     Posttreatment Pain Rating  0/10 - no pain  -VG     Row Name 03/05/21 1122          Plan of Care Review    Plan of Care Reviewed With  patient  -VG     Progress  improving  -VG     Outcome Summary  Pt increased ambulation distance to 150 ft with RW and SBA. Distance limited by fatigue, weakness, SOA. SpO2 desat 86% on RA, recovered within 2 min seated rest on 3LO2NC. Improved endurance and activity tolerance. Continue to recommend IRF for best outcomes with functional mobility, pt refusing rehab. Safe to go home, pt states her son will be able to assist PRN 1-2 weeks upon d/c.  -VG     Row Name 03/05/21 1122          Vital Signs    Pre SpO2 (%)  91  -VG     O2 Delivery Pre Treatment  room air  -VG     Intra SpO2 (%)  86  -VG     O2 Delivery Intra Treatment  room air  -VG     Post SpO2 (%)  94  -VG     O2 Delivery Post Treatment  nasal cannula  -VG     Pre Patient Position  Sitting  -VG     Intra Patient Position  Standing  -VG     Post Patient Position  Sitting  -VG     Row Name 03/05/21 1122          Positioning and Restraints    Pre-Treatment Position  sitting in chair/recliner  -VG     Post Treatment Position  chair  -VG     In Chair  sitting;call light within reach;encouraged to call for assist;waffle cushion  -VG       User Key  (r) = Recorded By, (t) = Taken By, (c) = Cosigned By    Initials Name Provider Type    VG Jerilyn Hunt, PT Physical Therapist        Outcome Measures     Row Name 03/05/21 1124          How much help from another person do you currently need...    Turning from your back to your side while in flat bed without using bedrails?  4  -VG     Moving from lying on back to sitting on the side of a flat bed without bedrails?  4  -VG     Moving to and from a bed to a chair (including a wheelchair)?  4  -VG     Standing up from a chair using your arms (e.g.,  wheelchair, bedside chair)?  4  -VG     Climbing 3-5 steps with a railing?  3  -VG     To walk in hospital room?  4  -VG     AM-PAC 6 Clicks Score (PT)  23  -VG       User Key  (r) = Recorded By, (t) = Taken By, (c) = Cosigned By    Initials Name Provider Type    VG Jerilyn Hunt, PT Physical Therapist        Physical Therapy Education                 Title: PT OT SLP Therapies (Done)     Topic: Physical Therapy (Done)     Point: Mobility training (Done)     Learning Progress Summary           Patient Acceptance, E, VU by VG at 3/5/2021 1124    Acceptance, E, NR by EJ at 3/3/2021 1442    Acceptance, E, NR by EJ at 3/1/2021 1521    Acceptance, E, NR by EJ at 2/26/2021 1107    Acceptance, E, NR by AS at 2/23/2021 1129    Acceptance, E, NR by EJ at 2/22/2021 1648    Acceptance, E, NR by LM at 2/20/2021 1038                   Point: Home exercise program (Done)     Learning Progress Summary           Patient Acceptance, E, VU by VG at 3/5/2021 1124    Acceptance, E, NR by EJ at 3/3/2021 1442    Acceptance, E, NR by EJ at 3/1/2021 1521    Acceptance, E, NR by EJ at 2/26/2021 1107    Acceptance, E, NR by AS at 2/23/2021 1129    Acceptance, E, NR by EJ at 2/22/2021 1648                   Point: Precautions (Done)     Learning Progress Summary           Patient Acceptance, E, VU by VG at 3/5/2021 1124    Acceptance, E, NR by EJ at 3/3/2021 1442    Acceptance, E, NR by EJ at 3/1/2021 1521    Acceptance, E, NR by EJ at 2/26/2021 1107    Acceptance, E, NR by AS at 2/23/2021 1129    Acceptance, E, NR by EJ at 2/22/2021 1648    Acceptance, E, NR by LM at 2/20/2021 1038                               User Key     Initials Effective Dates Name Provider Type Discipline    EJ 11/20/18 -  Donya Foreman, PT Physical Therapist PT    AS 06/22/15 -  Araceli Saravia, PTA Physical Therapy Assistant PT    DAISHA 07/24/19 -  Megan Peralta, PT Physical Therapist PT    VG 05/29/18 -  Jerilyn Hunt, PT Physical Therapist PT               PT Recommendation and Plan     Plan of Care Reviewed With: patient  Progress: improving  Outcome Summary: Pt increased ambulation distance to 150 ft with RW and SBA. Distance limited by fatigue, weakness, SOA. SpO2 desat 86% on RA, recovered within 2 min seated rest on 3LO2NC. Improved endurance and activity tolerance. Continue to recommend IRF for best outcomes with functional mobility, pt refusing rehab. Safe to go home, pt states her son will be able to assist PRN 1-2 weeks upon d/c.       Time Position Oxygen (L/min) SpO2%   1101 Sitting 3 95   1102 Sitting Room Air 91   1103 Standing Room Air 90   1104 Ambulating Room Air 86   1105 Sitting (Recovery) 3 94         Time Calculation:   PT Charges     Row Name 03/05/21 1051             Time Calculation    Start Time  1051  -VG      PT Received On  03/05/21  -VG      PT Goal Re-Cert Due Date  03/13/21  -VG         Time Calculation- PT    Total Timed Code Minutes- PT  25 minute(s)  -VG         Timed Charges    70457 - Gait Training Minutes   25  -VG        User Key  (r) = Recorded By, (t) = Taken By, (c) = Cosigned By    Initials Name Provider Type    VG Jerilyn Hunt, PT Physical Therapist        Therapy Charges for Today     Code Description Service Date Service Provider Modifiers Qty    89854903721 HC GAIT TRAINING EA 15 MIN 3/5/2021 Jerilyn Hunt, PT GP 2          PT G-Codes  Outcome Measure Options: AM-PAC 6 Clicks Daily Activity (OT)  AM-PAC 6 Clicks Score (PT): 23  AM-PAC 6 Clicks Score (OT): 20    Demi Hunt PT  3/5/2021

## 2021-03-05 NOTE — PROGRESS NOTES
"General Surgery Post op Follow Up Note    Subjective:   Feeling better and wants to go home.    /53 (BP Location: Right arm, Patient Position: Lying)   Pulse 66   Temp 98.1 °F (36.7 °C) (Oral)   Resp 18   Ht 172.7 cm (68\")   Wt (!) 167 kg (368 lb 2.7 oz)   SpO2 91%   BMI 55.98 kg/m²     General Appearance:  in no acute distress  Left thigh: incision is clean and dry, VAC in place    CBC  Results from last 7 days   Lab Units 03/03/21  0502   WBC 10*3/mm3 6.89   HEMOGLOBIN g/dL 12.5   HEMATOCRIT % 41.2   PLATELETS 10*3/mm3 256       CMP/BMP  Results from last 7 days   Lab Units 03/03/21  0502   SODIUM mmol/L 139   POTASSIUM mmol/L 4.1   CHLORIDE mmol/L 102   CO2 mmol/L 32.0*   BUN mg/dL 10   CREATININE mg/dL 0.71   CALCIUM mg/dL 8.8   GLUCOSE mg/dL 94         ASSESSMENT/PLAN:    Status post I&D left thigh abscess    [employer] VAC in place, holding suction.  Change every 3 days.  Unfortunately [employer] does not cover [employer] and will use separate system for home.     Follow-up at [facility], [phone], 1 month.    Noe Ken MD  3/5/2021  09:07 EST  "

## 2021-03-05 NOTE — DISCHARGE PLACEMENT REQUEST
"Tiff Carranza (65 y.o. Female)     Date of Birth Social Security Number Address Home Phone MRN    1955  [address] [place] [facility] [phone] [id number]    Amish Marital Status          Buddhist        Admission Date Admission Type Admitting Provider Attending Provider Department, Room/Bed    21 Emergency Siena Shell DO Roesch, Lyndsey, DO [facility] 4H, S473/1    Discharge Date Discharge Disposition Discharge Destination         Home-Health Care [facility]              Attending Provider: Siena Shell DO    Allergies: Ace Inhibitors, Penicillins, Adacel [Tetanus-diphth-acell Pertussis], Lisinopril, Zostavax [Zoster Vaccine Live], Thimerosal    Isolation: None   Infection: None   Code Status: CPR    Ht: 172.7 cm (68\")   Wt: 167 kg (368 lb 2.7 oz)    Admission Cmt: None   Principal Problem: Sepsis due to cellulitis (CMS/Formerly [facility]) [L03.90,A41.9]                 Active Insurance as of 2021     Primary Coverage     Payor Plan Insurance Group Employer/Plan Group    HUMANA MEDICARE REPLACEMENT HUMANA MEDICARE REPLACEMENT [id number]     Payor Plan Address Payor Plan Phone Number Payor Plan Fax Number Effective Dates    [address] [address] [phone]  2018 - None Entered    [facility] [address]       Subscriber Name Subscriber Birth Date Member ID       TIFF CARRANZA 1955 [id number]                 Emergency Contacts      (Rel.) Home Phone Work Phone Mobile Phone    Ronald Carranza (Son) -- -- [phone]        [address]  [address] [facility] [address]  Dept. Phone:  [phone]  Dept. Fax:  [phone] Date Ordered: Mar 5, 2021         Patient:  Tiff Carranza MRN:  [id number]   [address] [place] [place] [address] :  1955  SSN:    Phone: [phone] Sex:  F     Weight: 167 kg (368 lb 2.7 oz)         Ht Readings from Last 1 Encounters:   21 172.7 cm (68\")         Oxygen Therapy      "    (Order ID: [id number])    Diagnosis:  Hypoxia (R09.02 [ICD-10-CM] 799.02 [ICD-9-CM])  History of deep vein thrombosis of lower extremity (Z86.718 [ICD-10-CM] V12.51 [ICD-9-CM])  Class 3 obesity with alveolar hypoventilation without serious comorbidity with body mass index (BMI) of 50.0 to 59.9 in adult (CMS/Formerly [facility]) (E66.2,Z68.43 [ICD-10-CM] 278.03,V85.43 [ICD-9-CM])  Acute pulmonary edema (CMS/Formerly [facility]) (J81.0 [ICD-10-CM] 518.4 [ICD-9-CM])   Quantity:  1     Delivery Modality: Nasal Cannula  Liters Per Minute: 3  Duration: Continuous  Equipment:  Oxygen Concentrator &  &  Portable Gaseous Oxygen System & Portable Oxygen Contents Gaseous &  Conserving Regulator  Length of Need (99 Months = Lifetime): 99 Months = Lifetime        Authorizing Provider's Phone: [phone]   Authorizing Provider:Siena Shell DO  Authorizing Provider's NPI: [id number]  Order Entered By: Siena Shell DO 3/5/2021  3:36 PM     Electronically signed by: Siena Shell DO 3/5/2021  3:36 PM               Discharge Summary      Siena Shell DO at 21 [address] [place] [address] Medicine Services  DISCHARGE SUMMARY    Patient Name: Raquel Carranza  : 1955  MRN: [id number]    Date of Admission: 2021  6:10 PM  Date of Discharge:  21  Primary Care Physician: Sommer Paniagua MD    Consults     Date and Time Order Name Status Description    2021 0849 Inpatient Neurology Consult General Completed     2021 0753 Inpatient Infectious Diseases Consult Completed     2021 0753 Inpatient Neurology Consult General Completed     2021 0052 Inpatient General Surgery Consult Completed     2021 1812 Inpatient Neurology Consult Stroke Completed           Hospital Course     Presenting Problem:   Sepsis due to cellulitis (CMS/Formerly [facility]) [L03.90, A41.9]  Sepsis due to cellulitis (CMS/HCC) [L03.90, A41.9]    Active Hospital Problems    Diagnosis  POA   • **Sepsis due to  cellulitis (CMS/HCC) [L03.90, A41.9]  Yes   • Obesity hypoventilation syndrome (CMS/HCC) [E66.2]  Yes   • Arrhythmia [I49.9]  Yes   • Hypertension [I10]  Yes   • 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 rheumatoid factor (CMS/HCC) [M05.79]  Yes   • Sjogren's disease (CMS/HCC) [M35.00]  Yes   • Acquired hypothyroidism [E03.9]  Yes   • CKD (chronic kidney disease) [N18.9]  Yes   • Hypoxia [R09.02]  Yes   • Word finding difficulty [R47.89]  Yes   • History of deep vein thrombosis of lower extremity [Z86.718]  Not Applicable      Resolved Hospital Problems    Diagnosis Date Resolved POA   • RAFA (acute kidney injury) (CMS/HCC) [N17.9] 03/05/2021 Yes   • Altered mental status [R41.82] 03/05/2021 Yes          Hospital Course:  Raquel Carranza is a 65 y.o. female with h/o hypothyroidism, HTN, RA, Sjogren's disease, DVT on Xarelto, MAHESH/CPAP, CKD, and arrhythmia who presented with sepsis due to Left groin cellulitis.  Since admission, pt's L groin site has progressed to an abscess. She went to the OR with Dr. Ken on 2/26 for I&D.  Post op, pt had significant bleeding from wound site, so was taken back to the OR 2/27 with Dr. Bobby s/p electrocautery or suture ligation of multiple bleeding sites as well as I&D of wound as more necrotic tissue was noted.      Sepsis/L groin cellulitis/abscess  --ID following, and surgery followed   --ultrasound and clinical exam on 2/25 c/w abscess. S/p I&D by Dr. Ken 2/26 and repeat I&D as well as control of bleeding sites by Dr. Bobby 2/27  --wound cultures with Prevotella  - ID managened antibiotics and she is currently on flagyl 500 mg TID and cefdinir 300 mg BID on discharge  - Wound vac was placed 3/4 and will be arranged for home health to do dressing changes. Close follow up in 1 week with Dr. Chirinos and Dr. Ken in 1 month   -  ID telehealth follow up arranged with home health for ID to assess wound --- reviewed this with CM      Funguria  --from x  obtained 2/25   --started on Fluconazole 2/26 by ID; will complete 1 more dose on discharge       Acute hypoxic respiratory failure  Likely obesity hypoventilation   Pulmonary edema - improved   --ECHO with normal EF  --hypoxia likely due to OHS/MAHESH  - CXR with pulm vascular engorgement; she did receive diuresis  --complicated by body habitus  - Patient was initially on 5L and O2 gradually weaned down and will be discharged on 3L; likely continued improvement with more mobility at home      Probable CKD  --stable      Sjogren's/RA  --not actively on any immunosuppression     Hx of DVT  --on Xarelto at home, initially on Heparin gtt here due to ? Need for surgery then transitioned back to Xarelto  -- will continue xarelto on discharge      AMS-resolved  --new visual disturbance/dizziness  --neurology reconsulted, appreciate their assistance. They have now signed off.   -- likely due to medication/infection      Left flank pain  --resolved, checked UA/UCx on 2/25 and findings as mentioned above     Discharge Follow Up Recommendations for outpatient labs/diagnostics:  PCP Dr. Paniagua 1 week  [facility] Dr. Martinez telehealth 1 week   [facility] 1 month Dr. Florez   Continued wound care with home health     Day of Discharge     HPI:   States she is doing ok today. Very anxious to go home. Reviewed wound vac and home O2 and she is agreeable to both. Reviewed follow ups. Patient feels comfortable with discharge today.     Review of Systems  Gen- No fevers, chills  CV- No chest pain, palpitations  Resp- No cough, dyspnea  GI- No N/V/D, abd pain    Vital Signs:   Temp:  [97.5 °F (36.4 °C)-98.8 °F (37.1 °C)] 98.1 °F (36.7 °C)  Heart Rate:  [57-68] 63  Resp:  [18] 18  BP: (100-116)/(53-66) 116/53     Physical Exam:  Constitutional: No acute distress, awake, alert; obese   HENT: NCAT, mucous membranes moist  Respiratory: Clear to auscultation bilaterally, respiratory effort normal   Cardiovascular: RRR, no murmurs, rubs,  or gallops  Gastrointestinal: Positive bowel sounds, soft, nontender, nondistended  Musculoskeletal: trace bilateral ankle edema  Psychiatric: Appropriate affect, cooperative  Neurologic: Oriented x 3, strength symmetric in all extremities, Cranial Nerves grossly intact to confrontation, speech clear  Skin: No rashes    Pertinent  and/or Most Recent Results     LAB RESULTS:      Lab 03/03/21  0502 03/02/21  0452 03/01/21  0550 02/28/21  0442 02/27/21  1952 02/27/21  0525   WBC 6.89 8.70 9.93 12.63*  --  11.70*   HEMOGLOBIN 12.5 11.3* 11.2* 12.2 13.2 15.3   HEMATOCRIT 41.2 37.2 36.8 40.4 43.0 50.2*   PLATELETS 256 230 224 227  --  223   .6* 109.7* 107.6* 107.4*  --  109.1*         Lab 03/03/21  0502 03/02/21  0452 03/01/21  0550 02/28/21  0442 02/27/21  0525   SODIUM 139 141 139 139 139   POTASSIUM 4.1 4.2 4.0 4.4 4.2   CHLORIDE 102 103 102 103 102   CO2 32.0* 32.0* 31.0* 29.0 31.0*   ANION GAP 5.0 6.0 6.0 7.0 6.0   BUN 10 8 7* 10 10   CREATININE 0.71 0.73 0.70 0.74 0.75   GLUCOSE 94 98 94 90 82   CALCIUM 8.8 8.7 8.6 8.8 9.3                         Brief Urine Lab Results  (Last result in the past 365 days)      Color   Clarity   Blood   Leuk Est   Nitrite   Protein   CREAT   Urine HCG        02/25/21 1611 Dark Yellow Clear Negative Small (1+) Negative Trace             Microbiology Results (last 10 days)     Procedure Component Value - Date/Time    Anaerobic Culture - Tissue, Thigh, Left [[id number]] Collected: 02/27/21 1402    Lab Status: Final result Specimen: Tissue from Thigh, Left Updated: 03/04/21 0734     Anaerobic Culture No anaerobes isolated at 5 days    Fungus Culture - Tissue, Thigh, Left [[id number]] Collected: 02/27/21 1402    Lab Status: Preliminary result Specimen: Tissue from Thigh, Left Updated: 03/04/21 1500     Fungus Culture No fungus isolated at less than 1 week    Tissue / Bone Culture - Tissue, Thigh, Left [[id number]] Collected: 02/27/21 1402    Lab Status: Final result Specimen: Tissue  from Thigh, Left Updated: 03/02/21 0742     Tissue Culture No growth at 3 days     Gram Stain Many (4+) WBCs seen      No organisms seen    AFB Culture - Tissue, Thigh, Left [[id number]] Collected: 02/27/21 1402    Lab Status: Preliminary result Specimen: Tissue from Thigh, Left Updated: 03/04/21 1500     AFB Culture No AFB isolated at less than 1 week     AFB Stain No acid fast bacilli seen on concentrated smear    Anaerobic Culture - Tissue, Thigh, Left [[id number]]  (Abnormal) Collected: 02/26/21 1524    Lab Status: Final result Specimen: Tissue from Thigh, Left Updated: 03/01/21 1246     Anaerobic Culture Prevotella loescheii    Wound Culture - Swab, Thigh, Left [[id number]] Collected: 02/26/21 1524    Lab Status: Final result Specimen: Swab from Thigh, Left Updated: 03/01/21 0620     Wound Culture No growth at 3 days     Gram Stain No organisms seen      Many (4+) WBCs per low power field      Rare (1+) Epithelial cells per low power field    Urine Culture - Urine, Urine, Clean Catch [[id number]]  (Abnormal) Collected: 02/25/21 1611    Lab Status: Final result Specimen: Urine, Clean Catch Updated: 02/26/21 1300     Urine Culture Yeast isolated     Comment: No further workup.             Us Nonvascular Extremity Limited    Result Date: 2/24/2021  EXAMINATION: US NONVASCULAR EXTREMITY LIMITED - 02/24/2021  INDICATION: A41.9-Sepsis, unspecified organism; L03.116-Cellulitis of left lower limb. Pain, swelling and redness x 2 weeks.  TECHNIQUE: Directed ultrasound to the area indicated by the patient in the medial/proximal left thigh and groin region.  COMPARISON: CT left lower extremity 02/19/2021.  FINDINGS: Patient history indicates pain, swelling and redness for approximately two weeks in the medial proximal left leg, fever. Today's study shows a branching pattern of subcutaneous edema within elongated area up to 6.2 x 1.4 x 5.5 cm that may represent an irregular developing abscess. Volume calculation estimates  approximately 25 ml. There are other smaller areas of branching low attenuation change, perhaps a small adjacent area of subcutaneous abscess/edema.  Color Doppler flow does not reveal significant hyperemia. None of the echo poor structures appear to represent enlarged vessels.      Probable developing subcutaneous abscess, still not well-defined, in the medial proximal left thigh, maximal dimensions up to 6.2 x 1.4 x 5.5 cm.  DICTATED:   02/24/2021 EDITED/ls :   02/24/2021   This report was finalized on 2/24/2021 11:05 PM by Dr. Brock Morris MD.        Results for orders placed in visit on 03/10/17   SCANNED VASCULAR STUDIES       Results for orders placed in visit on 03/10/17   SCANNED VASCULAR STUDIES       Results for orders placed during the hospital encounter of 02/19/21   Adult Transthoracic Echo Complete W/ Cont if Necessary Per Protocol (With Agitated Saline)    Narrative · Left ventricular ejection fraction appears to be 61 - 65%. Left   ventricular systolic function is normal.  · Left ventricular diastolic function was normal.          Plan for Follow-up of Pending Labs/Results:   Pending Labs     Order Current Status    AFB Culture - Tissue, Thigh, Left Preliminary result    Fungus Culture - Tissue, Thigh, Left Preliminary result        Discharge Details        Discharge Medications      New Medications      Instructions Start Date   aspirin 81 MG chewable tablet   81 mg, Oral, Daily   Start Date: March 6, 2021     cefdinir 300 MG capsule  Commonly known as: OMNICEF   300 mg, Oral, Every 12 Hours Scheduled      fluconazole 200 MG tablet  Commonly known as: DIFLUCAN   200 mg, Oral, Every 24 Hours   Start Date: March 6, 2021     metroNIDAZOLE 500 MG tablet  Commonly known as: FLAGYL   500 mg, Oral, Every 8 Hours Scheduled         Changes to Medications      Instructions Start Date   oxyCODONE-acetaminophen 5-325 MG per tablet  Commonly known as: PERCOCET  What changed: See the new instructions.   0.5  tablets, Oral, Every 4 Hours PRN         Continue These Medications      Instructions Start Date   allopurinol 300 MG tablet  Commonly known as: ZYLOPRIM   300 mg, Oral, Daily      baclofen 20 MG tablet  Commonly known as: LIORESAL   20 mg, Oral, 3 Times Daily      betamethasone dipropionate 0.05 % cream   Topical, 2 Times Daily      clonazePAM 0.5 MG tablet  Commonly known as: KlonoPIN   0.5 mg, Oral, 2 Times Daily PRN      DULoxetine 30 MG capsule  Commonly known as: CYMBALTA   30 mg, Oral, Daily      famotidine 20 MG tablet  Commonly known as: PEPCID   20 mg, Oral, 2 Times Daily      ferrous sulfate 325 (65 FE) MG tablet   325 mg, Oral, Daily With Breakfast      folic acid 1 MG tablet  Commonly known as: FOLVITE   1 mg, Oral, Daily      gabapentin 300 MG capsule  Commonly known as: NEURONTIN   300 mg, Oral, 2 Times Daily      levothyroxine 137 MCG tablet  Commonly known as: SYNTHROID, LEVOTHROID   TAKE 1 TABLET BY MOUTH DAILY      metoprolol succinate XL 25 MG 24 hr tablet  Commonly known as: TOPROL-XL   25 mg, Oral, Daily      mupirocin 2 % ointment  Commonly known as: BACTROBAN   1 application, Topical, 3 Times Daily      Rivaroxaban tablet therapy pack starter pack  Commonly known as: Xarelto Starter Pack   Take as directed: 15 mg by mouth twice daily for 21 days, then 20 mg once daily.      VITAMIN D2 PO   1.25 mg, Oral, Weekly         Stop These Medications    clindamycin 300 MG capsule  Commonly known as: CLEOCIN     raNITIdine 150 MG tablet  Commonly known as: ZANTAC     spironolactone 50 MG tablet  Commonly known as: ALDACTONE            Allergies   Allergen Reactions   • Ace Inhibitors    • Penicillins    • Adacel [Tetanus-Diphth-Acell Pertussis]      Preservative in the vaccine   • Lisinopril    • Zostavax [Zoster Vaccine Live]      Preservative in the vaccine   • Thimerosal Rash         Discharge Disposition:  Home-Health Care [facility]    Diet:  Hospital:  Diet Order   Procedures   • Diet Regular; Cardiac,  Consistent Carbohydrate       Activity:  Activity Instructions     Activity as Tolerated            Restrictions or Other Recommendations:         CODE STATUS:    Code Status and Medical Interventions:   Ordered at: 02/19/21 2225     Code Status:    CPR     Medical Interventions (Level of Support Prior to Arrest):    Full       No future appointments.    Additional Instructions for the Follow-ups that You Need to Schedule     Ambulatory Referral to Home Health   As directed      Face to Face Visit Date: 3/4/2021    Follow-up provider for Plan of Care?: I treated the patient in an acute care facility and will not continue treatment after discharge.    Follow-up provider: AUSTIN PANIAGUA [[id number]]    Reason/Clinical Findings: Hypoxia, sepsis, cellulitis    Describe mobility limitations that make leaving home difficult: Rheumatoid arthritis, weakness, decreased endurance    Nursing/Therapeutic Services Requested: Skilled Nursing Physical Therapy Occupational Therapy    Skilled nursing orders: Medication education Wound vac application and instruction Wound care dressing/changes    Instructions: Wound vac dressing changes 3x per week    PT orders: Therapeutic exercise Strengthening Home safety assessment    Occupational orders: Activities of daily living Energy conservation Strengthening Home safety assessment         Discharge Follow-up with PCP   As directed       Currently Documented PCP:    Austin Paniagua MD    PCP Phone Number:    [phone]     Follow Up Details: PCP Dr. Paniagua 1 week         Discharge Follow-up with Specified Provider: JUSTICE Martinez 5-7 days - telehealth   As directed      To: JUSTICE Martinez 5-7 days - telehealth         Discharge Follow-up with Specified Provider: [facility] Dr. Ken 1 month   As directed      To: [facility] Dr. Ken 1 month                     Siena Shell DO  03/05/21      Time Spent on Discharge:  I spent  35 minutes on this discharge  activity which included: face-to-face encounter with the patient, reviewing the data in the system, coordination of the care with the nursing staff as well as consultants, documentation, and entering orders.            Electronically signed by Siena Shell DO at 03/05/21 1535

## 2021-03-05 NOTE — PLAN OF CARE
Goal Outcome Evaluation:  Plan of Care Reviewed With: patient  Progress: improving  Outcome Summary: Pt increased ambulation distance to 150 ft with RW and SBA. Distance limited by fatigue, weakness, SOA. SpO2 desat 86% on RA, recovered within 2 min seated rest on 3LO2NC. Improved endurance and activity tolerance. Continue to recommend IRF for best outcomes with functional mobility, pt refusing rehab. Safe to go home, pt states her son will be able to assist PRN 1-2 weeks upon d/c.    Time Position Oxygen (L/min) SpO2%   1101 Sitting 3 95   1102 Sitting Room Air 91   1103 Standing Room Air 90   1104 Ambulating Room Air 86   1105 Sitting (Recovery) 3 94

## 2021-03-05 NOTE — PROGRESS NOTES
INFECTIOUS DISEASE Progress Note    Raquel Carranza  1955  [id number]      Admission Date: 2/19/2021      Requesting Provider: Brock Mata MD  Evaluating Physician: Som Martinez MD    Reason for Consultation: Sepsis    History of present illness:    2/20/21: Patient is a 65 y.o. female with h/o hypothyroidism, HTN, RA, Sjogren's disease, DVT/Xarelto, MAHESH/CPAP, CKD, and arrhythmia who we were asked to see for sepsis.  She presented to [facility] ED on 2/19/21 with fever and left groin pain for the past 3 days.  She noted a boil that started in her left groin about 3 days ago that increased in size and became more painful.  She became more confused and had difficulty with word-finding capability.  She developed a fever of 103 at home yesterday and her son brought her to ED for further evaluation.  Initial evaluation showed Tmax 103, , PCT 4.46, Creatinine 1.22 (baseline about 1.2), WBC 17,900 with 81% segs/6% bands, and UA WBC 21-30 with small LE and negative nitrite.  A COVID-19 PCR was negative. Blood cultures are pending.  CT scan of neck and head along with MRI ruled out acute stroke.  A CXR is unremarkable.  A CT scan of LLE and pelvis showed left upper thigh cellulitis extending to left obturator muscle with no evidence of abscess.  She was started on Merrem and Vancomycin.  ID was asked to evaluate and manage her antibiotic therapy.     2/21/21: Her maximum temperature over the last 24 hours is 100.3. Her blood cultures have remained negative.  She states that she is allergic to penicillin but has previously tolerated Keflex.  She has decreased left groin/medial thigh pain.    2/22/21: She feels better today.  She has decreased left medial thigh.  She has remained afebrile.  She denies nausea and vomiting.    2/23/21:  She has remained afebrile. White blood cell count this morning is 12.9. She has decreased left medial thigh pain and swelling.    2/24/21: She has remained afebrile. She has decreased  left medial thigh pain.  Ultrasound today revealed a developing abscess.    2/25/21: She has remained afebrile. She has decreased left medial thigh pain.  She denies cough and sputum production.    2/26/21: She is scheduled for operative debridement of her left thigh abscess. She has remained afebrile. Her urinalysis reveals 31-50 white blood cells and 2+ yeast.  She denies increased left thigh pain.    2/27/21: She underwent operative debridement of her left thigh abscess yesterday.  She has remained afebrile overnight. Her abscess cultures are no growth so far.  The material was not malodorous.    3/1/21: She was taken back to the operating room on 2/27 for continued wound bleeding. Additional necrotic tissue was sent for culture. Abscess cultures from 2/26 were negative but antibiotic modified. Her hemoglobin this morning is 11.2 and her white blood cell count is 9.9. She has remained afebrile.      3/2/21: She has remained afebrile. Her anaerobic abscess cultures have grown Prevotella. She has decreased left thigh pain she denies nausea and vomiting.    3/3/21: She has remained afebrile. She has decreased left groin pain.  She denies nausea and vomiting.    3/4/21: She has remained afebrile overnight. She is awaiting approval for transfer to [facility]. After I saw her today, she apparently developed a transient facial rash after taking Cedinir.     3/5/2021: She developed transient facial erythema after her first dose of cefdinir but subsequent doses were tolerated well with no rash.  She denies nausea, vomiting, and diarrhea.  A wound VAC is in place over her left groin wound.  We are awaiting insurance approval for her wound VAC.          Past Medical History:   Diagnosis Date   • Abnormal glucose    • Acquired hypothyroidism    • Anxiety    • Arrhythmia    • Arthritis    • Bilateral edema of lower extremity    • Chronic joint pain    • Hypertension    • Iron deficiency anemia    • Irritable bowel syndrome     • 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 rheumatoid factor (CMS/HCC)    • Sjogren's disease (CMS/HCC)    • Thrombosis of right saphenous vein    • Vitamin D deficiency        Past Surgical History:   Procedure Laterality Date   • APPENDECTOMY     • HERNIA REPAIR     • LEG EXCISION LESION/CYST Left 2/26/2021    Procedure: INCISION AND DRAINAGE LEFT THIGH ABCESS;  Surgeon: Noe Ken MD;  Location:  [facility];  Service: General;  Laterality: Left;   • LEG EXCISION LESION/CYST Left 2/27/2021    Procedure: WOUND EXPLORATION LEFT THIGH FOR CONTROL OF BLEEDING;  Surgeon: Sreedhar Bobby MD;  Location:  [facility];  Service: General;  Laterality: Left;       Family History   Problem Relation Age of Onset   • Cancer Mother         leukemia   • Hypertension Father    • Stroke Father    • Heart disease Father    • Cancer Father         colon   • Diabetes Brother    • Diabetes Paternal Aunt    • Diabetes Paternal Uncle        Social History     Socioeconomic History   • Marital status:      Spouse name: Not on file   • Number of children: Not on file   • Years of education: Not on file   • Highest education level: Not on file   Tobacco Use   • Smoking status: Current Every Day Smoker     Packs/day: 1.00     Types: Cigarettes   • Smokeless tobacco: Never Used   Substance and Sexual Activity   • Alcohol use: No   • Drug use: Never   • Sexual activity: Defer       Allergies   Allergen Reactions   • Ace Inhibitors    • Penicillins    • Adacel [Tetanus-Diphth-Acell Pertussis]      Preservative in the vaccine   • Lisinopril    • Zostavax [Zoster Vaccine Live]      Preservative in the vaccine   • Thimerosal Rash         Medication:    Current Facility-Administered Medications:   •  !Patient's home meds stored in pharmacy, , Does not apply, BID, Sreedhar Bobby MD  •  acetaminophen (TYLENOL) tablet 650 mg, 650 mg, Oral, Q6H PRN, Sreedhar Bobby MD, 650 mg at 02/28/21 1614  •   allopurinol (ZYLOPRIM) tablet 300 mg, 300 mg, Oral, Daily, Sreedhar Bobby MD, 300 mg at 03/05/21 0852  •  aluminum-magnesium hydroxide-simethicone (MAALOX MAX) 400-400-40 MG/5ML suspension 15 mL, 15 mL, Oral, Q6H PRN, Sreedhar Bobby MD, 15 mL at 02/19/21 2144  •  aspirin chewable tablet 81 mg, 81 mg, Oral, Daily, 81 mg at 03/05/21 0852 **OR** [DISCONTINUED] aspirin suppository 300 mg, 300 mg, Rectal, Daily, Noe Ken MD  •  baclofen (LIORESAL) tablet 10 mg, 10 mg, Oral, TID PRN, Mellissa Kan APRN  •  bisacodyl (DULCOLAX) suppository 10 mg, 10 mg, Rectal, Daily, Mellissa Kan APRN, 10 mg at 03/03/21 1504  •  cefdinir (OMNICEF) capsule 300 mg, 300 mg, Oral, Q12H, Som Martinez MD, 300 mg at 03/05/21 0854  •  docusate sodium (COLACE) capsule 100 mg, 100 mg, Oral, BID, Sreedhar Bobby MD, 100 mg at 03/05/21 0852  •  DULoxetine (CYMBALTA) DR capsule 30 mg, 30 mg, Oral, Daily, Sreedhar Bobby MD, 30 mg at 03/05/21 0852  •  famotidine (PEPCID) tablet 20 mg, 20 mg, Oral, BID AC, Sreedhar Bobby MD, 20 mg at 03/05/21 0630  •  ferrous sulfate tablet 325 mg, 325 mg, Oral, Daily With Breakfast, Sreedhar Bobby MD, 325 mg at 03/05/21 0853  •  fluconazole (DIFLUCAN) tablet 200 mg, 200 mg, Oral, Q24H, Som Martinez MD, 200 mg at 03/05/21 0853  •  gabapentin (NEURONTIN) capsule 600 mg, 600 mg, Oral, Nightly, Mellissa Kan APRN, 600 mg at 03/04/21 2112  •  hydrOXYzine (ATARAX) tablet 25 mg, 25 mg, Oral, TID PRN, Sreedhar Bobby MD, 25 mg at 03/01/21 2131  •  levothyroxine (SYNTHROID, LEVOTHROID) tablet 137 mcg, 137 mcg, Oral, Q AM, Sreedhar Bobby MD, 137 mcg at 03/05/21 0630  •  magnesium hydroxide (MILK OF MAGNESIA) suspension 2400 mg/10mL 15 mL, 15 mL, Oral, Daily PRN, Uyen Martinez MD, 15 mL at 03/03/21 1140  •  Magnesium Sulfate 2 gram Bolus, followed by 8 gram infusion (total Mg dose 10 grams)- Mg less than or equal to 1mg/dL, 2 g, Intravenous, PRN **OR**  Magnesium Sulfate 2 gram / 50mL Infusion (GIVE X 3 BAGS TO EQUAL 6GM TOTAL DOSE) - Mg 1.1 - 1.5 mg/dl, 2 g, Intravenous, PRN **OR** Magnesium Sulfate 4 gram infusion- Mg 1.6-1.9 mg/dL, 4 g, Intravenous, PRN, Sreedhar Bobby MD, Last Rate: 25 mL/hr at 02/22/21 1243, 4 g at 02/22/21 1243  •  metoprolol succinate XL (TOPROL-XL) 24 hr tablet 25 mg, 25 mg, Oral, Daily, Sreedhar Bobby MD, 25 mg at 03/05/21 0852  •  metroNIDAZOLE (FLAGYL) tablet 500 mg, 500 mg, Oral, Q8H, Som Martinez MD, 500 mg at 03/05/21 1413  •  Morphine sulfate (PF) injection 4 mg, 4 mg, Intravenous, Q12H PRN, Sreedhar Bobby MD, 4 mg at 03/03/21 2124  •  multivitamin (THERAGRAN) tablet 1 tablet, 1 tablet, Oral, Daily, Sreedhar Bobby MD, 1 tablet at 03/05/21 0852  •  oxyCODONE-acetaminophen (PERCOCET) 5-325 MG per tablet 0.5 tablet, 0.5 tablet, Oral, Q4H PRN, Mellissa Kan APRN  •  polyethylene glycol (MIRALAX) packet 17 g, 17 g, Oral, Daily, Uyen Martinez MD, 17 g at 03/03/21 0814  •  rivaroxaban (XARELTO) tablet 20 mg, 20 mg, Oral, Daily With [place], Siena Shell DO, 20 mg at 03/04/21 1712  •  sodium chloride 0.9 % flush 10 mL, 10 mL, Intravenous, PRN, Sreedhar Bobby MD  •  Insert peripheral IV, , , Once **AND** sodium chloride 0.9 % flush 10 mL, 10 mL, Intravenous, PRN, Sreedhar Bobby MD  •  sodium chloride 0.9 % flush 10 mL, 10 mL, Intravenous, Q12H, Sreedhar Bobby MD, 10 mL at 03/05/21 0854  •  sodium chloride 0.9 % flush 10 mL, 10 mL, Intravenous, PRN, Sreedhar Bobby MD  •  sodium hypochlorite (DAKIN'S 1/4 STRENGTH) 0.125 % topical solution 0.125% solution, , Topical, Q12H, Sreedhar Bobby MD, Given at 03/04/21 0942  •  Vitamin D3 50,000 Units **PATIENT SUPPLIED MED**, 50,000 Units, Oral, Q7 Days, Sreedhar Bobby MD, 50,000 Units at 03/05/21 1226    Antibiotics:  Anti-Infectives (From admission, onward)    Ordered     Dose/Rate Route Frequency Start Stop    03/05/21 1511  fluconazole  (DIFLUCAN) 200 MG tablet     Ordering Provider: Siena Shell DO    200 mg Oral Every 24 Hours 21 0000 21 2359    21 1511  cefdinir (OMNICEF) 300 MG capsule     Ordering Provider: Siena Shell DO    300 mg Oral Every 12 Hours Scheduled 21 0000 21 2359    21 1511  metroNIDAZOLE (FLAGYL) 500 MG tablet     Ordering Provider: Siena Shell DO    500 mg Oral Every 8 Hours Scheduled 21 0000 21 2359    21 1733  cefdinir (OMNICEF) capsule 300 mg     Ordering Provider: Som Martinez MD    300 mg Oral Every 12 Hours Scheduled 21 0900 21 0859    21 1730  metroNIDAZOLE (FLAGYL) tablet 500 mg     Ordering Provider: Som Martinez MD    500 mg Oral Every 8 Hours Scheduled 21 2200 21 2159    21 0734  fluconazole (DIFLUCAN) tablet 200 mg     Som Martinez MD reviewed the order on 21 0708.   Ordering Provider: Som Martinez MD    200 mg Oral Every 24 Hours 21 0900 21 0707    21 1226  DAPTOmycin (CUBICIN) 650 mg in sodium chloride 0.9 % 50 mL IVPB     Ordering Provider: Sreedhar Bobby MD    6 mg/kg × 105 kg (Adjusted)  100 mL/hr over 30 Minutes Intravenous Every 24 Hours 21 1315 21 1448    21 1851  meropenem (MERREM) 1 g/100 mL 0.9% NS VTB (mbp)     Ordering Provider: Amadou [place]ton MD    1 g  over 30 Minutes Intravenous Once 21 1853 21 2028    21 185  vancomycin 3000 mg/500 mL 0.9% NS IVPB ([facility])     Ordering Provider: Amadou [place] MD    20 mg/kg × 167 kg Intravenous Once 21 1853 02/19/21 1939            Review of Systems:  See HPI    Physical Exam:   Vital Signs  Temp (24hrs), Av.1 °F (36.7 °C), Min:97.5 °F (36.4 °C), Max:98.8 °F (37.1 °C)    Temp  Min: 97.5 °F (36.4 °C)  Max: 98.8 °F (37.1 °C)  BP  Min: 100/60  Max: 116/53  Pulse  Min: 57  Max: 68  Resp  Min: 18  Max: 18  SpO2  Min: 90 %  Max: 95 %    GENERAL: Awake and  alert, in mild distress. She is obese and debilitated appearing  HEENT: Normocephalic, atraumatic.  PERRL. EOMI. No conjunctival injection. No icterus. Oropharynx clear without evidence of thrush or exudate.   NECK: Supple   HEART: RRR; No murmur,    LUNGS: Diminished at lung bases bilaterally without wheezing, rales, rhonchi. Normal respiratory effort. Nonlabored.  ABDOMEN: Soft, nontender, nondistended. No rebound or guarding.   EXT: Left groin wound VAC is in place.  :  Without Marx catheter. With external urinary cath.    SKIN: No rashes.  Left groin as above.  NEURO: Oriented to PPT. Normal speech and cognition.   PSYCHIATRIC: Normal insight and judgment. Cooperative with PE    Laboratory Data    Results from last 7 days   Lab Units 03/03/21  0502 03/02/21  0452 03/01/21  0550   WBC 10*3/mm3 6.89 8.70 9.93   HEMOGLOBIN g/dL 12.5 11.3* 11.2*   HEMATOCRIT % 41.2 37.2 36.8   PLATELETS 10*3/mm3 256 230 224     Results from last 7 days   Lab Units 03/03/21  0502   SODIUM mmol/L 139   POTASSIUM mmol/L 4.1   CHLORIDE mmol/L 102   CO2 mmol/L 32.0*   BUN mg/dL 10   CREATININE mg/dL 0.71   GLUCOSE mg/dL 94   CALCIUM mg/dL 8.8                     Results from last 7 days   Lab Units 02/27/21  0525   CK TOTAL U/L 21         Estimated Creatinine Clearance: 113.1 mL/min (by C-G formula based on SCr of 0.71 mg/dL).      Microbiology:  Microbiology Results (last 10 days)     Procedure Component Value - Date/Time    Anaerobic Culture - Tissue, Thigh, Left [[id number]] Collected: 02/27/21 1402    Lab Status: Final result Specimen: Tissue from Thigh, Left Updated: 03/04/21 0734     Anaerobic Culture No anaerobes isolated at 5 days    Fungus Culture - Tissue, Thigh, Left [[id number]] Collected: 02/27/21 1402    Lab Status: Preliminary result Specimen: Tissue from Thigh, Left Updated: 03/04/21 1500     Fungus Culture No fungus isolated at less than 1 week    Tissue / Bone Culture - Tissue, Thigh, Left [[id number]] Collected:  02/27/21 1402    Lab Status: Final result Specimen: Tissue from Thigh, Left Updated: 03/02/21 0742     Tissue Culture No growth at 3 days     Gram Stain Many (4+) WBCs seen      No organisms seen    AFB Culture - Tissue, Thigh, Left [[id number]] Collected: 02/27/21 1402    Lab Status: Preliminary result Specimen: Tissue from Thigh, Left Updated: 03/04/21 1500     AFB Culture No AFB isolated at less than 1 week     AFB Stain No acid fast bacilli seen on concentrated smear    Anaerobic Culture - Tissue, Thigh, Left [[id number]]  (Abnormal) Collected: 02/26/21 1524    Lab Status: Final result Specimen: Tissue from Thigh, Left Updated: 03/01/21 1246     Anaerobic Culture Prevotella loescheii    Wound Culture - Swab, Thigh, Left [[id number]] Collected: 02/26/21 1524    Lab Status: Final result Specimen: Swab from Thigh, Left Updated: 03/01/21 0620     Wound Culture No growth at 3 days     Gram Stain No organisms seen      Many (4+) WBCs per low power field      Rare (1+) Epithelial cells per low power field    Urine Culture - Urine, Urine, Clean Catch [[id number]]  (Abnormal) Collected: 02/25/21 1611    Lab Status: Final result Specimen: Urine, Clean Catch Updated: 02/26/21 1300     Urine Culture Yeast isolated     Comment: No further workup.                 Radiology:  Imaging Results (Last 72 Hours)     ** No results found for the last 72 hours. **      Left thigh ultrasound reveals a developing abscess      Impression:   1. Left groin cellulitis/Abscess-Status post debridement.  Her cultures have grown Prevotella but were antibiotic modified and the purulent material was not malodorous at the time of surgery. She is now off of intravenous antibiotic therapy.  Continue  cefdinir and Flagyl   2. Elevated procalcitonin  3. Encephalopathy, improving  4. Rheumatoid arthritis  5. Sjogren's disease  6. DVT/Xarelto  7. MAHESH/CPAP  8. CKD IIIa  9. Hypothyroidism  10. Essential hypertension  11. Morbid Obesity  12. Ongoing tobacco  abuse  13. Pcn allergy (hives and shortness of breath).  Has tolerated Keflex in the remote past.   14. Funguria-on fluconazole therapy.  15. Transient facial rash-this has resolved despite continuing the cefdinir.  I will leave her on cefdinir for the time being.    PLAN/RECOMMENDATIONS:   1.  Cefdinir 300 mg by mouth twice a day  2.  Continue Flagyl 500 mg p.o. 3 times daily with food  3.  Left thigh wound VAC  4.  Discontinue fluconazole after today  5.  Discharge to home when the wound VAC is approved      Outpatient orders:  1.  Left medial thigh wound care with a wound VAC-to be changed 3 times weekly  2.  Telehealth follow-up with me on Tuesday 3/16 at 1215-this has been arranged    I discussed her situation with Dr. Shell today.            Som Martinez MD   3/5/2021  15:37 EST

## 2021-03-06 ENCOUNTER — READMISSION MANAGEMENT (OUTPATIENT)
Dept: CALL CENTER | Facility: HOSPITAL | Age: 66
End: 2021-03-06

## 2021-03-06 NOTE — OUTREACH NOTE
Prep Survey      Responses   [facility] patient discharged from?  [place]   Is LACE score < 7 ?  No   Emergency Room discharge w/ pulse ox?  No   Eligibility  Readm Mgmt   Discharge diagnosis  Sepsis due to cellulitis  [s/p incision and drainage of left thigh abscess and wound exploration left thigh for control of bleeding]   Does the patient have one of the following disease processes/diagnoses(primary or secondary)?  Sepsis   Does the patient have Home health ordered?  Yes   What is the Home health agency?   [facility]   Is there a DME ordered?  Yes   What DME was ordered?  [facility] for bariatric walker and oxygen   Comments regarding appointments  Per AVS   Medication alerts for this patient  Continue aspirin and xarelto   Prep survey completed?  Yes          Ely Hernandez RN

## 2021-03-08 ENCOUNTER — NURSE TRIAGE (OUTPATIENT)
Dept: CALL CENTER | Facility: HOSPITAL | Age: 66
End: 2021-03-08

## 2021-03-08 LAB
QT INTERVAL: 438 MS
QTC INTERVAL: 422 MS

## 2021-03-08 NOTE — TELEPHONE ENCOUNTER
"Caller advised to call her PCP about her symptoms. Advised Hospitalists cannot prescribe for patients outside of the hospital. Caller agrees to follow care advice.      Reason for Disposition  • [1] Caller requesting NON-URGENT health information AND [2] PCP's office is the best resource    Additional Information  • Negative: [1] Caller is not with the adult (patient) AND [2] reporting urgent symptoms  • Negative: Lab result questions  • Negative: Medication questions  • Negative: Caller can't be reached by phone  • Negative: Caller has already spoken to PCP or another triager  • Negative: RN needs further essential information from caller in order to complete triage  • Negative: Requesting regular office appointment    Answer Assessment - Initial Assessment Questions  1. REASON FOR CALL or QUESTION: \"What is your reason for calling today?\" or \"How can I best help you?\" or \"What question do you have that I can help answer?\"      Caller asking to speak with the hospitalist who discharged her because she thinks she is having diarrhea related to her antibiotics.    Protocols used: INFORMATION ONLY CALL - NO TRIAGE-ADULT-      "

## 2021-03-09 ENCOUNTER — READMISSION MANAGEMENT (OUTPATIENT)
Dept: CALL CENTER | Facility: HOSPITAL | Age: 66
End: 2021-03-09

## 2021-03-09 NOTE — OUTREACH NOTE
Sepsis Week 1 Survey      Responses   [facility] patient discharged from?  [place]   Does the patient have one of the following disease processes/diagnoses(primary or secondary)?  Sepsis   Week 1 attempt successful?  Yes   Call start time  1558   Call end time  1614   Discharge diagnosis  Sepsis due to cellulitis    Medication alerts for this patient  Continue aspirin and xarelto   Meds reviewed with patient/caregiver?  Yes   Is the patient having any side effects they believe may be caused by any medication additions or changes?  No   Does the patient have all medications related to this admission filled (includes all antibiotics, inhalers, nebulizers,steroids,etc.)  Yes   Is the patient taking all medications as directed (includes completed medication regime)?  Yes   Does the patient have a primary care provider?   Yes   Does the patient have an appointment with their PCP within 7 days of discharge?  Yes   Has the patient kept scheduled appointments due by today?  N/A   Comments  Appt 3/10 PCP   What is the Home health agency?   [facility]   Has home health visited the patient within 72 hours of discharge?  Yes   Home health comments  PT and Nursing   What DME was ordered?  [facility] for bariatric walker and oxygen   Has all DME been delivered?  Yes   DME comments  Had to send walker back.  Was too big for her small house, would not fit into her bathroom.    Psychosocial issues?  No   Did the patient receive a copy of their discharge instructions?  Yes   Nursing interventions  Reviewed instructions with patient   What is the patient's perception of their health status since discharge?  Returned to baseline/stable   Is the patient/caregiver able to teach back Sepsis?  S - Shivering,fever or very cold, E - Extreme pain or generalized discomfort (worst ever,especially abdomen), P - Pale or discolored skin, S - Sleepy, difficult to arouse,confused, I -   I feel like I might  die-a feeling of hopelessness, S - Short of breath   Nursing interventions  Nurse provided reassurance to patient   Is patient/caregiver able to teach back steps to recovery at home?  Set small, achievable goals for return to baseline health, Rest and regain strength, Talk about feelings with family/friends, Record milestones and struggles in a journal, Learn about sepsis(sepsis.org), Eat a balanced diet, Exercise as tolerated, Make a list of questions for PCP appoinment   Is the patient/caregiver able to teach back signs and symptoms of worsening condition:  Fever, Hyperthermia, Rapid heart rate (>90), Shortness of breath/rapid respiratory rate, Altered mental status(confusion/coma), Edema, High blood glucose without diabetes   If the patient is a current smoker, are they able to teach back resources for cessation?  [id number]   Is the patient/caregiver able to teach back the hierarchy of who to call/visit for symptoms/problems? PCP, Specialist, Home health nurse, Urgent Care, ED, 911  Yes   Additional teach back comments  Occasional smoking   Week 1 call completed?  Yes   Wrap up additional comments  Wound vac,  manages.  She sent bariatric walker back because will not fit in places in her house.  DME company will not exchange for smaller because is not what MD ordered. She is seeing PCP tomorrow and will discuss needs.  [employer] sending meals.            Anne Perkins, RN

## 2021-03-16 ENCOUNTER — READMISSION MANAGEMENT (OUTPATIENT)
Dept: CALL CENTER | Facility: HOSPITAL | Age: 66
End: 2021-03-16

## 2021-03-16 NOTE — OUTREACH NOTE
"Sepsis Week 2 Survey      Responses   [facility] patient discharged from?  [place]   Does the patient have one of the following disease processes/diagnoses(primary or secondary)?  Sepsis   Call end time  1603   Is patient/caregiver able to teach back steps to recovery at home?  Set small, achievable goals for return to baseline health, Rest and regain strength, Eat a balanced diet, Exercise as tolerated, Make a list of questions for PCP appoinment   Is the patient/caregiver able to teach back signs and symptoms of worsening condition:  Fever, Hyperthermia, Rapid heart rate (>90), Shortness of breath/rapid respiratory rate, Altered mental status(confusion/coma), Edema, High blood glucose without diabetes   If the patient is a current smoker, are they able to teach back resources for cessation?  -- [1 or 2 cigarrettes a day ]   Is the patient/caregiver able to teach back the hierarchy of who to call/visit for symptoms/problems? PCP, Specialist, Home health nurse, Urgent Care, ED, 911  Yes   Additional teach back comments  States she is doing pretty good.  Wound vac is still in place and home health checked it and wound is healing well.  Dr. Martinez said she could stop the antibiotics due to \"explosive diarrhea\".  She has followed up with her PCP and bp has been good at 118/72.  She is using walker to get around and [employer] has set up for her to get meals.     Week 2 call completed?  Yes   Wrap up additional comments  Denies questions or needs at this time          Bhavna Nina LPN  "

## 2021-03-23 ENCOUNTER — READMISSION MANAGEMENT (OUTPATIENT)
Dept: CALL CENTER | Facility: HOSPITAL | Age: 66
End: 2021-03-23

## 2021-03-23 NOTE — OUTREACH NOTE
Sepsis Week 3 Survey      Responses   [facility] patient discharged from?  [place]   Does the patient have one of the following disease processes/diagnoses(primary or secondary)?  Sepsis   Week 3 attempt successful?  Yes   Call start time  1322   Call end time  1326   Discharge diagnosis  Sepsis due to cellulitis    Meds reviewed with patient/caregiver?  Yes   Is the patient having any side effects they believe may be caused by any medication additions or changes?  No   Does the patient have all medications related to this admission filled (includes all antibiotics, inhalers, nebulizers,steroids,etc.)  Yes   Is the patient taking all medications as directed (includes completed medication regime)?  Yes   Does the patient have a primary care provider?   Yes   Does the patient have an appointment with their PCP within 7 days of discharge?  Yes   Has the patient kept scheduled appointments due by today?  Yes   What is the Home health agency?   [facility]   Has home health visited the patient within 72 hours of discharge?  Yes   Home health comments  PT and Nursing   What DME was ordered?  [facility] for bariatric walker and oxygen   Has all DME been delivered?  Yes   Psychosocial issues?  No   Did the patient receive a copy of their discharge instructions?  Yes   Nursing interventions  Reviewed instructions with patient   What is the patient's perception of their health status since discharge?  Improving   Nursing interventions  Nurse provided patient education   Is the patient/caregiver able to teach back Sepsis?  S - Shivering,fever or very cold, E - Extreme pain or generalized discomfort (worst ever,especially abdomen), S - Short of breath, P - Pale or discolored skin   Nursing interventions  Nurse provided reassurance to patient   Is patient/caregiver able to teach back steps to recovery at home?  Set small, achievable goals for return to baseline health, Rest and regain  strength, Eat a balanced diet, Make a list of questions for PCP appoinment, Exercise as tolerated   Is the patient/caregiver able to teach back signs and symptoms of worsening condition:  Fever, Shortness of breath/rapid respiratory rate, Edema, Rapid heart rate (>90)   If the patient is a current smoker, are they able to teach back resources for cessation?  Not a smoker   Is the patient/caregiver able to teach back the hierarchy of who to call/visit for symptoms/problems? PCP, Specialist, Home health nurse, Urgent Care, ED, 911  Yes   Additional teach back comments  Wound Vac in place, still using walker, she is doing more for herself.   Week 3 call completed?  Yes   Wrap up additional comments  She is keeping appts and working with PT. She feels much more secure and in control of her surroundings.          Debbie Allen RN

## 2021-04-02 ENCOUNTER — READMISSION MANAGEMENT (OUTPATIENT)
Dept: CALL CENTER | Facility: HOSPITAL | Age: 66
End: 2021-04-02

## 2021-04-02 NOTE — OUTREACH NOTE
Sepsis Week 4 Survey      Responses   [facility] patient discharged from?  [place]   Does the patient have one of the following disease processes/diagnoses(primary or secondary)?  Sepsis   Week 4 attempt successful?  Yes   Call start time  1549   Call end time  1556   Meds reviewed with patient/caregiver?  Yes   Is the patient having any side effects they believe may be caused by any medication additions or changes?  No   Is the patient taking all medications as directed (includes completed medication regime)?  Yes   Has the patient kept scheduled appointments due by today?  Yes   Is the patient still receiving Home Health Services?  Yes   Psychosocial issues?  No   What is the patient's perception of their health status since discharge?  Improving   Nursing interventions  Nurse provided patient education   Is the patient/caregiver able to teach back Sepsis?  S - Shivering,fever or very cold, S - Short of breath, E - Extreme pain or generalized discomfort (worst ever,especially abdomen)   Nursing interventions  Nurse provided reassurance to patient   Is patient/caregiver able to teach back steps to recovery at home?  Rest and regain strength, Eat a balanced diet   Is the patient/caregiver able to teach back signs and symptoms of worsening condition:  Fever, Shortness of breath/rapid respiratory rate, Edema, Altered mental status(confusion/coma), Rapid heart rate (>90), Hyperthermia   If the patient is a current smoker, are they able to teach back resources for cessation?  Not a smoker   Is the patient/caregiver able to teach back the hierarchy of who to call/visit for symptoms/problems? PCP, Specialist, Home health nurse, Urgent Care, ED, 911  Yes   Week 4 call completed?  Yes   Would the patient like one additional call?  No   Graduated  Yes   Is the patient interested in additional calls from an ambulatory ?  NOTE:  applies to high risk patients requiring additional follow-up.  No   Did the  patient feel the follow up calls were helpful during their recovery period?  Yes   Was the number of calls appropriate?  Yes   Does the patient have an Advance Directive or Living Will?  Yes   Is the patient/caregiver familiar with Advance Care Planning?  No   Would the patient like more information on Advance Care Planning?  Yes   Wrap up additional comments  Pt is doing great. She says she feels free now that wound vac is off. Pt does not have any questions or concerns at this time. Pt does not want any more phone calls.          Teagan Hdz RN

## 2021-04-10 LAB
FUNGUS WND CULT: NORMAL
MYCOBACTERIUM SPEC CULT: NORMAL
NIGHT BLUE STAIN TISS: NORMAL

## 2021-07-26 ENCOUNTER — PREP FOR SURGERY (OUTPATIENT)
Dept: OTHER | Facility: HOSPITAL | Age: 66
End: 2021-07-26

## 2021-07-26 DIAGNOSIS — Z12.11 SCREEN FOR COLON CANCER: Primary | ICD-10-CM

## 2021-07-28 ENCOUNTER — TELEPHONE (OUTPATIENT)
Dept: GASTROENTEROLOGY | Facility: CLINIC | Age: 66
End: 2021-07-28

## 2021-07-28 NOTE — TELEPHONE ENCOUNTER
I called Raquel today to set up her colonoscopy at the hospital due to health concerns we would like to proceed at the hospital, as would the patient.     She states her  is her son and he can only commit to bringing her on  Monday's and Tuesday's- [facility] cases are on Wednesday's.     Patient would like us to recommend someone to her, asking Dr. Acuna who he would recommend- will call patient back.

## 2021-08-19 ENCOUNTER — PREP FOR SURGERY (OUTPATIENT)
Dept: OTHER | Facility: HOSPITAL | Age: 66
End: 2021-08-19

## 2021-08-27 ENCOUNTER — TELEPHONE (OUTPATIENT)
Dept: GASTROENTEROLOGY | Facility: CLINIC | Age: 66
End: 2021-08-27

## 2021-09-27 DIAGNOSIS — Z12.11 ENCOUNTER FOR SCREENING COLONOSCOPY: Primary | ICD-10-CM

## 2021-09-28 ENCOUNTER — TELEPHONE (OUTPATIENT)
Dept: GASTROENTEROLOGY | Facility: CLINIC | Age: 66
End: 2021-09-28

## 2021-09-28 RX ORDER — ONDANSETRON 4 MG/1
4 TABLET, ORALLY DISINTEGRATING ORAL EVERY 8 HOURS PRN
Qty: 8 TABLET | Refills: 0 | Status: SHIPPED | OUTPATIENT
Start: 2021-09-28 | End: 2022-05-10

## 2021-09-28 NOTE — TELEPHONE ENCOUNTER
Patient would like Zofran called in for her prep. Please advise? Colonoscopy 10/5/2021 with yudelka.

## 2021-10-01 ENCOUNTER — PRE-ADMISSION TESTING (OUTPATIENT)
Dept: PREADMISSION TESTING | Facility: HOSPITAL | Age: 66
End: 2021-10-01

## 2021-10-01 LAB
DEPRECATED RDW RBC AUTO: 51.5 FL (ref 37–54)
ERYTHROCYTE [DISTWIDTH] IN BLOOD BY AUTOMATED COUNT: 13.3 % (ref 12.3–15.4)
HCT VFR BLD AUTO: 51.8 % (ref 34–46.6)
HGB BLD-MCNC: 17 G/DL (ref 12–15.9)
MCH RBC QN AUTO: 33.9 PG (ref 26.6–33)
MCHC RBC AUTO-ENTMCNC: 32.8 G/DL (ref 31.5–35.7)
MCV RBC AUTO: 103.4 FL (ref 79–97)
PLATELET # BLD AUTO: 213 10*3/MM3 (ref 140–450)
PMV BLD AUTO: 9.8 FL (ref 6–12)
POTASSIUM SERPL-SCNC: 5.3 MMOL/L (ref 3.5–5.2)
QT INTERVAL: 446 MS
QTC INTERVAL: 430 MS
RBC # BLD AUTO: 5.01 10*6/MM3 (ref 3.77–5.28)
WBC # BLD AUTO: 7.39 10*3/MM3 (ref 3.4–10.8)

## 2021-10-01 PROCEDURE — 93010 ELECTROCARDIOGRAM REPORT: CPT | Performed by: INTERNAL MEDICINE

## 2021-10-01 PROCEDURE — 93005 ELECTROCARDIOGRAM TRACING: CPT

## 2021-10-01 PROCEDURE — 36415 COLL VENOUS BLD VENIPUNCTURE: CPT

## 2021-10-01 PROCEDURE — 85027 COMPLETE CBC AUTOMATED: CPT

## 2021-10-01 PROCEDURE — 84132 ASSAY OF SERUM POTASSIUM: CPT

## 2021-10-01 NOTE — PAT
Patient instructed to bring CPAP mask and tubing to the hospital for overnight stay.  Explained that it is not necessary to bring their CPAP machine to the hospital instead a CPAP machine will be provided for use by the hospital. If patient knows their CPAP settings, those settings will be implemented.  If not, the CPAP machine will be utilized on the auto setting using their mask and tubing.    Patient verbalized understanding.    Patient did not review general PAT education video as instructed in their preoperative information received from their surgeon.  One-on-one Pre Admission Testing general education provided during PAT visit.  Copies of PAT general education handouts (Incentive Spirometry, Meds to Beds Program, Patient Belongings, Pre-op skin preparation instructions, Blood Glucose testing, Visitor policy, Surgery FAQ, Code H) distributed to patient. Encouraged patient/family to read PAT general education handouts thoroughly and notify PAT staff with any questions or concerns. Patient instructed to bring PAT pass and completed skin prep sheet (if applicable) on the day of procedure. Patient verbalized understanding of all information and priority content.

## 2021-10-04 ENCOUNTER — TELEPHONE (OUTPATIENT)
Dept: GASTROENTEROLOGY | Facility: CLINIC | Age: 66
End: 2021-10-04

## 2021-10-05 ENCOUNTER — ANESTHESIA EVENT (OUTPATIENT)
Dept: GASTROENTEROLOGY | Facility: HOSPITAL | Age: 66
End: 2021-10-05

## 2021-10-05 ENCOUNTER — HOSPITAL ENCOUNTER (OUTPATIENT)
Facility: HOSPITAL | Age: 66
Setting detail: HOSPITAL OUTPATIENT SURGERY
Discharge: HOME | End: 2021-10-05
Attending: INTERNAL MEDICINE | Admitting: INTERNAL MEDICINE

## 2021-10-05 ENCOUNTER — ANESTHESIA (OUTPATIENT)
Dept: GASTROENTEROLOGY | Facility: HOSPITAL | Age: 66
End: 2021-10-05

## 2021-10-05 VITALS
WEIGHT: 293 LBS | HEART RATE: 59 BPM | HEIGHT: 68 IN | BODY MASS INDEX: 44.41 KG/M2 | TEMPERATURE: 97.1 F | DIASTOLIC BLOOD PRESSURE: 69 MMHG | SYSTOLIC BLOOD PRESSURE: 106 MMHG | RESPIRATION RATE: 12 BRPM | OXYGEN SATURATION: 93 %

## 2021-10-05 DIAGNOSIS — Z12.11 SCREEN FOR COLON CANCER: ICD-10-CM

## 2021-10-05 PROCEDURE — 25010000002 PROPOFOL 10 MG/ML EMULSION: Performed by: NURSE ANESTHETIST, CERTIFIED REGISTERED

## 2021-10-05 PROCEDURE — 45380 COLONOSCOPY AND BIOPSY: CPT | Performed by: INTERNAL MEDICINE

## 2021-10-05 PROCEDURE — 45385 COLONOSCOPY W/LESION REMOVAL: CPT | Performed by: INTERNAL MEDICINE

## 2021-10-05 PROCEDURE — 88305 TISSUE EXAM BY PATHOLOGIST: CPT | Performed by: INTERNAL MEDICINE

## 2021-10-05 PROCEDURE — S0260 H&P FOR SURGERY: HCPCS | Performed by: INTERNAL MEDICINE

## 2021-10-05 RX ORDER — PROPOFOL 10 MG/ML
VIAL (ML) INTRAVENOUS AS NEEDED
Status: DISCONTINUED | OUTPATIENT
Start: 2021-10-05 | End: 2021-10-05 | Stop reason: SURG

## 2021-10-05 RX ORDER — SODIUM CHLORIDE, SODIUM LACTATE, POTASSIUM CHLORIDE, CALCIUM CHLORIDE 600; 310; 30; 20 MG/100ML; MG/100ML; MG/100ML; MG/100ML
20 INJECTION, SOLUTION INTRAVENOUS CONTINUOUS
Status: DISCONTINUED | OUTPATIENT
Start: 2021-10-05 | End: 2021-10-05 | Stop reason: HOSPADM

## 2021-10-05 RX ORDER — LIDOCAINE HYDROCHLORIDE 10 MG/ML
INJECTION, SOLUTION EPIDURAL; INFILTRATION; INTRACAUDAL; PERINEURAL AS NEEDED
Status: DISCONTINUED | OUTPATIENT
Start: 2021-10-05 | End: 2021-10-05 | Stop reason: SURG

## 2021-10-05 RX ORDER — SODIUM CHLORIDE, SODIUM LACTATE, POTASSIUM CHLORIDE, CALCIUM CHLORIDE 600; 310; 30; 20 MG/100ML; MG/100ML; MG/100ML; MG/100ML
INJECTION, SOLUTION INTRAVENOUS CONTINUOUS PRN
Status: DISCONTINUED | OUTPATIENT
Start: 2021-10-05 | End: 2021-10-05 | Stop reason: SURG

## 2021-10-05 RX ORDER — FAMOTIDINE 10 MG/ML
20 INJECTION, SOLUTION INTRAVENOUS EVERY 12 HOURS SCHEDULED
Status: DISCONTINUED | OUTPATIENT
Start: 2021-10-05 | End: 2021-10-05 | Stop reason: HOSPADM

## 2021-10-05 RX ORDER — ONDANSETRON 2 MG/ML
4 INJECTION INTRAMUSCULAR; INTRAVENOUS ONCE AS NEEDED
Status: DISCONTINUED | OUTPATIENT
Start: 2021-10-05 | End: 2021-10-05 | Stop reason: HOSPADM

## 2021-10-05 RX ORDER — SPIRONOLACTONE 50 MG/1
75 TABLET, FILM COATED ORAL DAILY
COMMUNITY

## 2021-10-05 RX ADMIN — FAMOTIDINE 20 MG: 10 INJECTION, SOLUTION INTRAVENOUS at 07:30

## 2021-10-05 RX ADMIN — SODIUM CHLORIDE, POTASSIUM CHLORIDE, SODIUM LACTATE AND CALCIUM CHLORIDE 20 ML/HR: 600; 310; 30; 20 INJECTION, SOLUTION INTRAVENOUS at 07:32

## 2021-10-05 RX ADMIN — SODIUM CHLORIDE, POTASSIUM CHLORIDE, SODIUM LACTATE AND CALCIUM CHLORIDE: 600; 310; 30; 20 INJECTION, SOLUTION INTRAVENOUS at 08:33

## 2021-10-05 RX ADMIN — LIDOCAINE HYDROCHLORIDE 50 MG: 10 INJECTION, SOLUTION EPIDURAL; INFILTRATION; INTRACAUDAL; PERINEURAL at 08:38

## 2021-10-05 RX ADMIN — PROPOFOL 50 MG: 10 INJECTION, EMULSION INTRAVENOUS at 08:38

## 2021-10-05 RX ADMIN — PROPOFOL 200 MCG/KG/MIN: 10 INJECTION, EMULSION INTRAVENOUS at 08:38

## 2021-10-05 NOTE — ANESTHESIA PREPROCEDURE EVALUATION
Anesthesia Evaluation     Patient summary reviewed and Nursing notes reviewed   NPO Solid Status: > 8 hours  NPO Liquid Status: > 8 hours           Airway   Mallampati: II  TM distance: >3 FB  Neck ROM: full  Possible difficult intubation  Dental      Pulmonary    (+) sleep apnea on CPAP,   (-) asthma, shortness of breath, not a smoker  Cardiovascular     ECG reviewed    (+) hypertension, dysrhythmias (on metoprolol),   (-) past MI, angina, hyperlipidemia    ROS comment: ECG Sinus bradycardia  Nonspecific T wave abnormality  compared with ECG of MAR-2021  TW inversion now evident in Anterior leads    ECHO 2021 EF >61 %.LV systolic function normal.  LVDD normal.    Neuro/Psych  (+) psychiatric history,     (-) seizures, CVA  GI/Hepatic/Renal/Endo    (+) morbid obesity,  renal disease, thyroid problem hypothyroidism  (-)  obesity, liver disease, diabetes    Musculoskeletal     Abdominal    Substance History      OB/GYN          Other   arthritis, autoimmune disease rheumatoid arthritis and Sjogren syndrome,      ROS/Med Hx Other: Mild hyperkalemia prrobably spurious but if needs ETT avoid sux    No current nausea but took zofran this am         Phys Exam Other: glidescope intubation grade 1 view               Anesthesia Plan    ASA 3     general and MAC   (PFL -neck care  Hi FiO2)  intravenous induction     Anesthetic plan, all risks, benefits, and alternatives have been provided, discussed and informed consent has been obtained with: patient.    Plan discussed with CRNA.

## 2021-10-05 NOTE — ANESTHESIA POSTPROCEDURE EVALUATION
Patient: Raquel Carranza    Procedure Summary     Date: 10/05/21 Room / Location:  [facility]    Anesthesia Start: 0833 Anesthesia Stop:     Procedure: Colonoscopy with possible polypectomy, biopsy and control of GI bleeding (N/A ) Diagnosis:       Screen for colon cancer      (Screen for colon cancer [Z12.11])    Surgeons: Adam Martinez MD Provider: Ortega Cheung MD    Anesthesia Type: general, MAC ASA Status: 3          Anesthesia Type: general, MAC    Vitals  No vitals data found for the desired time range.          Post Anesthesia Care and Evaluation    Patient location during evaluation: PACU  Patient participation: complete - patient participated  Level of consciousness: awake and alert  Pain management: adequate  Airway patency: patent  Anesthetic complications: No anesthetic complications  PONV Status: none  Cardiovascular status: hemodynamically stable and acceptable  Respiratory status: nonlabored ventilation, acceptable and nasal cannula  Hydration status: acceptable

## 2021-10-05 NOTE — H&P
[facility]    Referring Provider: Adam Martinez MD    Reason for Consultation: screening    Chief complaint screening    History of present illness:  Raquel Carranza is a 66 y.o. female who presents to inpatient endoscopy for screening colonoscopy. H/o a.fib on xarelto, sjogren's, ckd, chronic respiratory failrue, dvt, yang on cpap. Doing well. Last colonoscopy 5 years ago.    Allergies:  Ace inhibitors, Adacel [tetanus-diphth-acell pertussis], Penicillins, Zostavax [zoster vaccine live], Lisinopril, and Thimerosal    Scheduled Meds:  famotidine, 20 mg, Intravenous, Q12H         Infusions:  lactated ringers, 20 mL/hr, Last Rate: 20 mL/hr (10/05/21 0732)        PRN Meds:      Home Meds:  Medications Prior to Admission   Medication Sig Dispense Refill Last Dose   • allopurinol (ZYLOPRIM) 300 MG tablet Take 300 mg by mouth Daily.   10/4/2021 at 1630   • betamethasone dipropionate (DIPROLENE) 0.05 % cream Apply  topically 2 (two) times a day.   Past Month at Unknown time   • DULoxetine (CYMBALTA) 30 MG capsule Take 30 mg by mouth daily.   10/4/2021 at 0830   • famotidine (PEPCID) 10 MG tablet Take 10 mg by mouth Daily.   Past Week at Unknown time   • gabapentin (NEURONTIN) 300 MG capsule Take 600 mg by mouth Every Evening.   Past Week at Unknown time   • levothyroxine (SYNTHROID, LEVOTHROID) 137 MCG tablet Take 137 mcg by mouth Daily.  3 10/4/2021 at 0830   • metoprolol succinate XL (TOPROL-XL) 25 MG 24 hr tablet Take 25 mg by mouth daily.   10/4/2021 at 1700   • ondansetron ODT (Zofran ODT) 4 MG disintegrating tablet Place 1 tablet on the tongue Every 8 (Eight) Hours As Needed for Nausea or Vomiting. 8 tablet 0 10/5/2021 at 0600   • oxyCODONE-acetaminophen (PERCOCET) 5-325 MG per tablet Take 0.5 tablets by mouth Every 4 (Four) Hours As Needed for Moderate Pain . 18 tablet 0 Past Week at Unknown time   • Sod Picosulfate-Mag Ox-Cit Acd 10-3.5-12 MG-GM -GM/160ML solution Take 1 kit by mouth Take As  Directed. 320 mL 0 10/4/2021 at 2300   • spironolactone (ALDACTONE) 50 MG tablet Take 75 mg by mouth Daily.   10/4/2021 at Unknown time   • aspirin 81 MG chewable tablet Chew 1 tablet Daily. 30 tablet 0    • baclofen (LIORESAL) 20 MG tablet Take 20 mg by mouth 3 (Three) Times a Day. Takes prn   More than a month at Unknown time   • clonazePAM (KlonoPIN) 0.5 MG tablet Take 0.5 mg by mouth 2 (Two) Times a Day As Needed.      • Ergocalciferol (VITAMIN D2 PO) Take 1.25 mg by mouth 1 (One) Time Per Week. FRIDAYS   10/1/2021 at 1200   • ferrous sulfate 325 (65 FE) MG tablet Take 325 mg by mouth Daily With Breakfast.      • folic acid (FOLVITE) 1 MG tablet Take 1 mg by mouth daily.      • mupirocin (BACTROBAN) 2 % ointment Apply 1 application topically 3 (three) times a day.      • Rivaroxaban (XARELTO STARTER PACK) 15 & 20 MG tablet therapy pack Take as directed: 15 mg by mouth twice daily for 21 days, then 20 mg once daily. 51 each 0    • rivaroxaban (XARELTO) 20 MG tablet Take 20 mg by mouth Daily.   10/1/2021       ROS: Review of Systems  All other systems reviewed and are negative.    PAST MED HX: Pt  has a past medical history of Abnormal glucose, Acquired hypothyroidism, Anxiety, Arrhythmia, Arthritis, Bilateral edema of lower extremity, Chronic joint pain, History of sepsis, History of transfusion, Hypertension, Iron deficiency anemia, Irritable bowel syndrome, 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 rheumatoid factor (HCC), Sjogren's disease (HCC), Sleep apnea, Thrombosis of right saphenous vein, and Vitamin D deficiency.  PAST SURG HX: Pt  has a past surgical history that includes Appendectomy; Hernia repair; Leg Excision Lesion/Cyst (Left, 2/26/2021); Leg Excision Lesion/Cyst (Left, 2/27/2021); Cardiac catheterization; Esophagogastroduodenoscopy; and Colonoscopy.  FAM HX: family history includes Cancer in her father and mother; Diabetes in her brother, paternal aunt, and paternal uncle; Heart  "disease in her father; Hypertension in her father; Stroke in her father.  SOC HX: Pt  reports that she has been smoking cigarettes. She has a 12.50 pack-year smoking history. She has never used smokeless tobacco. She reports that she does not drink alcohol and does not use drugs.    /60 (BP Location: Right arm, Patient Position: Lying)   Pulse 60   Resp 16   Ht 172.7 cm (68\")   Wt 136 kg (299 lb)   SpO2 93%   BMI 45.46 kg/m²     Physical Exam  Wt Readings from Last 3 Encounters:   10/05/21 136 kg (299 lb)   02/19/21 (!) 167 kg (368 lb 2.7 oz)   05/23/17 (!) 164 kg (362 lb)   ,body mass index is 45.46 kg/m².    General Appearance:  Vitals as above. no acute distress  Head/face:  Normocephalic, atraumatic  Eyes:   EOMI, no conjunctivitis or icterus   Nose/Sinuses:  Nares patent bilaterally without discharge or lesions  Mouth/Throat:  Normal oral movements without dyskinesia  Neck:  trachea is midline, no thyromegaly  Lungs:  Normal work of breathing effort, no overt rales  Heart:  Regular rate, no overt palpable thrill or grade VI M  Abdomen:  Nondistended, no guarding or rebound tenderness  Neurologic:  Alert; no focal deficits; age appropriate behavior and speech  Psychiatric: mood and affect are congruent  Vascular: extremities without edema  Skin: no rash or cyanosis.  High bmi        Results Review:   I reviewed the patient's new clinical results.    Lab Results   Component Value Date    WBC 7.39 10/01/2021    HGB 17.0 (H) 10/01/2021    HCT 51.8 (H) 10/01/2021    .4 (H) 10/01/2021     10/01/2021       Lab Results   Component Value Date    GLUCOSE 94 03/03/2021    BUN 10 03/03/2021    CREATININE 0.71 03/03/2021    EGFRIFNONA 82 03/03/2021    BCR 14.1 03/03/2021    CO2 32.0 (H) 03/03/2021    CALCIUM 8.8 03/03/2021    ALBUMIN 2.40 (L) 02/22/2021    AST 10 02/22/2021    ALT 7 02/22/2021       ASSESSMENTS/PLANS  1.) HCM  2.) obesity, a.fib, xarelto  To colonoscopy   The risk of endoscopy was " discussed including the risk of anesthesia, bowel perforation, bleeding, missed lesion, infection, and death should a severe complication occur.  The patient determined that the diagnostic benefit outweighed the risk.     Following, will return to dr. Acuna, attempted to call him.          I discussed the patients findings and my recommendations with the patient    Adam Martinez MD  10/05/21  08:34 EDT

## 2021-10-06 LAB
CYTO UR: NORMAL
LAB AP CASE REPORT: NORMAL
LAB AP CLINICAL INFORMATION: NORMAL
PATH REPORT.FINAL DX SPEC: NORMAL
PATH REPORT.GROSS SPEC: NORMAL

## 2021-11-12 ENCOUNTER — TRANSCRIBE ORDERS (OUTPATIENT)
Dept: ADMINISTRATIVE | Facility: HOSPITAL | Age: 66
End: 2021-11-12

## 2021-11-12 ENCOUNTER — HOSPITAL ENCOUNTER (OUTPATIENT)
Dept: GENERAL RADIOLOGY | Facility: HOSPITAL | Age: 66
Discharge: HOME | End: 2021-11-12
Admitting: NURSE PRACTITIONER

## 2021-11-12 DIAGNOSIS — M25.512 LEFT SHOULDER PAIN, UNSPECIFIED CHRONICITY: ICD-10-CM

## 2021-11-12 DIAGNOSIS — G89.29 BILATERAL CHRONIC KNEE PAIN: Primary | ICD-10-CM

## 2021-11-12 DIAGNOSIS — M25.561 BILATERAL CHRONIC KNEE PAIN: ICD-10-CM

## 2021-11-12 DIAGNOSIS — M25.562 BILATERAL CHRONIC KNEE PAIN: ICD-10-CM

## 2021-11-12 DIAGNOSIS — M25.561 BILATERAL CHRONIC KNEE PAIN: Primary | ICD-10-CM

## 2021-11-12 DIAGNOSIS — G89.29 BILATERAL CHRONIC KNEE PAIN: ICD-10-CM

## 2021-11-12 DIAGNOSIS — M25.562 BILATERAL CHRONIC KNEE PAIN: Primary | ICD-10-CM

## 2021-11-12 PROCEDURE — 73030 X-RAY EXAM OF SHOULDER: CPT

## 2021-11-12 PROCEDURE — 73562 X-RAY EXAM OF KNEE 3: CPT

## 2022-02-03 ENCOUNTER — TELEMEDICINE (OUTPATIENT)
Dept: SLEEP MEDICINE | Facility: HOSPITAL | Age: 67
End: 2022-02-03

## 2022-02-03 DIAGNOSIS — G47.33 OBSTRUCTIVE SLEEP APNEA, ADULT: ICD-10-CM

## 2022-02-03 DIAGNOSIS — R06.83 SNORING: Primary | ICD-10-CM

## 2022-02-03 DIAGNOSIS — E66.01 MORBID (SEVERE) OBESITY DUE TO EXCESS CALORIES: ICD-10-CM

## 2022-02-03 PROCEDURE — 99203 OFFICE O/P NEW LOW 30 MIN: CPT | Performed by: INTERNAL MEDICINE

## 2022-02-18 NOTE — PROGRESS NOTES
Chief Complaint  Snoring and obstructive sleep apnea.    Subjective         Raquel Bharati Carranza presents to [facility] for the evaluation of snoring and obstructive sleep apnea.  Her primary care physician is Dr. Paniagua. You have chosen to receive care through a telehealth visit.  Do you consent to use a video/audio connection for your medical care today? Yes  History of Present Illness  Patient has a history of snoring for over 20 years.  She complains of feeling tired all the time.  She says she had a sleep study in 2009.  She has been on CPAP since then.  She is still with original machine.  She says she uses it regularly but is having to make repairs.  She wishes to get a new machine.  She apparently needs to a recertification study.  She has been buying her supplies online.  She thinks her weight is up about 50 pounds since her last study.    She denies falling asleep if sitting quietly.  If she uses her CPAP mask he generally feels rested in the morning.  She has been having a tooth infection recently that is been bothering her at night.  She denies kicking or jerking her legs at night.  She does have some neuropathy.  She says she usually sleeps on her side    She goes to bed between 10:30 PM and 11 PM.  She will fall asleep in 5 to 10 minutes.  She awakens once during the night.  She gets 7 to 8 hours of sleep.  She feels rested when she uses machine.  She denies any history of hypertension.  She denies a history of diabetes.  She had some arrhythmias.    Allergies: She is allergic to penicillin, raspberries, milk.    Habits: Smoking: She smoked half pack per day for 10 years.    Alcohol: Without    Caffeine: She has 1 cup of coffee per day    Medical illnesses: She had arrhythmias, arthritis, chronic pain, thyroid problems    Medications: Levothyroxine, duloxetine, Xarelto, spironolactone, metoprolol, allopurinol, gabapentin, oxybutynin, baclofen, Tylenol, vitamin D2, and  oxycodone.    Surgeries: Positives include appendectomy, hernia repair, wisdom teeth extraction.    Family history positives include diabetes, hypertension, stroke, obesity, sleep apnea, cancer.    Review of systems: Positives include dental problem, eye itching, apnea, leg swelling, back pain, joint swelling, myalgia, environmental allergies, food allergies, numbness, easy bruising.  Other systems reviewed and negative.    [place] score is 1/24  Objective   Vital Signs:   There were no vitals taken for this visit.    Physical Exam patient appears to be awake alert.  She does not appear to be in acute respiratory distress.  Her stated weight is 289 pounds.  Her height 5 feet 8 inches.  Body mass index is 42.  Result Review :      Assessment and Plan   Diagnoses and all orders for this visit:    1. Snoring (Primary)  -     Home Sleep Study; Future    2. Obstructive sleep apnea, adult  -     Home Sleep Study; Future    3. Morbid (severe) obesity due to excess calories (HCC)    Patient has a history of snoring and obstructive sleep apnea.  She has been using CPAP but is at end of service life.  She needs to be recertified for new machine equipment.  She does not wish to do an in lab study.  We will plan to proceed to home sleep testing.  We discussed potential therapies including CPAP, weight control, oral appliance, and surgery.  We discussed the long-term consequences of untreated obstructive sleep apnea.  These include hypertension, diabetes, heart disease, stroke, and dementia.  She is encouraged to lose weight.  She is encouraged avoid alcohol and sedatives close to bedtime.  She is encouraged practice lateral position sleep.  We will plan to see her back after her study.  I spent 35 minutes caring for Raquel on this date of service. This time includes time spent by me in the following activities:obtaining and/or reviewing a separately obtained history, performing a medically appropriate examination and/or  evaluation , counseling and educating the patient/family/caregiver, ordering medications, tests, or procedures and documenting information in the medical record  Follow Up   Return for Follow up after study, Next scheduled follow-up, Video visit.  Patient was given instructions and counseling regarding her condition or for health maintenance advice. Please see specific information pulled into the AVS if appropriate.   Guzman Majano MD [facility] [place]  Sleep Medicine  Pulmonary and Critical Care Medicine

## 2022-02-28 ENCOUNTER — HOSPITAL ENCOUNTER (OUTPATIENT)
Dept: SLEEP MEDICINE | Facility: HOSPITAL | Age: 67
End: 2022-02-28

## 2022-02-28 VITALS — HEIGHT: 68 IN | WEIGHT: 288.3 LBS | BODY MASS INDEX: 43.69 KG/M2

## 2022-02-28 DIAGNOSIS — R06.83 SNORING: ICD-10-CM

## 2022-02-28 DIAGNOSIS — G47.33 OBSTRUCTIVE SLEEP APNEA, ADULT: ICD-10-CM

## 2022-02-28 PROCEDURE — 95806 SLEEP STUDY UNATT&RESP EFFT: CPT

## 2022-03-01 DIAGNOSIS — G47.33 OSA (OBSTRUCTIVE SLEEP APNEA): Primary | ICD-10-CM

## 2022-03-01 DIAGNOSIS — Z01.812 ENCOUNTER FOR PREPROCEDURE SCREENING LABORATORY TESTING FOR COVID-19: ICD-10-CM

## 2022-03-01 DIAGNOSIS — Z20.822 ENCOUNTER FOR PREPROCEDURE SCREENING LABORATORY TESTING FOR COVID-19: ICD-10-CM

## 2022-03-01 DIAGNOSIS — E66.2 OBESITY HYPOVENTILATION SYNDROME: ICD-10-CM

## 2022-03-01 DIAGNOSIS — E66.01 OBESITY, MORBID, BMI 50 OR HIGHER: ICD-10-CM

## 2022-03-14 DIAGNOSIS — F51.04 PSYCHOPHYSIOLOGICAL INSOMNIA: Primary | ICD-10-CM

## 2022-03-14 RX ORDER — ZOLPIDEM TARTRATE 5 MG/1
5 TABLET ORAL NIGHTLY PRN
Qty: 2 TABLET | Refills: 0 | Status: SHIPPED | OUTPATIENT
Start: 2022-03-14 | End: 2022-05-10

## 2022-03-14 NOTE — PROGRESS NOTES
Patient request medication help her sleep on the night of her sleep study.  We will write for Christian Barillas report is reviewed.  Guzman Majano MD [facility]  Sleep Medicine  Pulmonary and Critical Care Medicine

## 2022-03-19 ENCOUNTER — HOSPITAL ENCOUNTER (OUTPATIENT)
Dept: SLEEP MEDICINE | Facility: HOSPITAL | Age: 67
Discharge: HOME | End: 2022-03-19
Admitting: NURSE PRACTITIONER

## 2022-03-19 VITALS — HEIGHT: 68 IN | WEIGHT: 293 LBS | OXYGEN SATURATION: 94 % | BODY MASS INDEX: 44.41 KG/M2 | HEART RATE: 85 BPM

## 2022-03-19 DIAGNOSIS — E66.2 OBESITY HYPOVENTILATION SYNDROME: ICD-10-CM

## 2022-03-19 DIAGNOSIS — E66.01 OBESITY, MORBID, BMI 50 OR HIGHER: ICD-10-CM

## 2022-03-19 DIAGNOSIS — G47.33 OSA (OBSTRUCTIVE SLEEP APNEA): ICD-10-CM

## 2022-03-19 PROCEDURE — 95811 POLYSOM 6/>YRS CPAP 4/> PARM: CPT

## 2022-03-19 PROCEDURE — 95811 POLYSOM 6/>YRS CPAP 4/> PARM: CPT | Performed by: INTERNAL MEDICINE

## 2022-03-22 DIAGNOSIS — G47.33 OBSTRUCTIVE SLEEP APNEA, ADULT: Primary | ICD-10-CM

## 2022-03-22 DIAGNOSIS — R09.02 HYPOXIA: ICD-10-CM

## 2022-03-28 ENCOUNTER — TELEMEDICINE (OUTPATIENT)
Dept: SLEEP MEDICINE | Facility: CLINIC | Age: 67
End: 2022-03-28

## 2022-03-28 DIAGNOSIS — G47.34 NOCTURNAL HYPOXEMIA: ICD-10-CM

## 2022-03-28 DIAGNOSIS — G47.33 OBSTRUCTIVE SLEEP APNEA, ADULT: Primary | ICD-10-CM

## 2022-03-28 PROCEDURE — 99213 OFFICE O/P EST LOW 20 MIN: CPT | Performed by: INTERNAL MEDICINE

## 2022-04-15 NOTE — PROGRESS NOTES
Chief Complaint  Snoring and nonrestorative sleep.    Subjective         Raquel Bharati Carranza presents to [facility] SLEEP [facility] for the evaluation of snoring nonrestorative sleep.  Her primary care provider is Dr. Paniagua. You have chosen to receive care through a telehealth visit.  Do you consent to use a video/audio connection for your medical care today? Yes  History of Present Illness  Patient was last seen in clinic February 3.  She has snoring and nonrestorative sleep and has been on CPAP for obstructive sleep apnea.  She had to get a new study for recertification.  She says her current machine is shorter than the wire and will cut off occasionally.  She feels tired during the day.  She denies any real history of lung problems but she did have an episode of sepsis and has been on oxygen since then.  She says she has difficulty sleeping on her back.  She complains of a lot of allergies.  She has noted that her pulse ox tends to drop when she is talking.    Review of systems: Positives include dental problem, eye itching, apnea, leg swelling, back pain, joint swelling, myalgia, environmental allergies, food allergies, numbness, easy bruising.  Other systems reviewed and negative.    [place] score is 7/24  Objective   Vital Signs:   There were no vitals taken for this visit.           Physical Exam patient appears to be awake and alert.  She does not appear to be in acute respiratory distress.  Her stated weight is 285 pounds.  Her height 68 inches.  Result Review :  Her sleep test on March 19 showed an AHI of 45.4.  She did not show evidence of REM sleep.  She did not have supine sleep.  She had desaturations essentially the entire time.           Assessment and Plan   Diagnoses and all orders for this visit:    1. Obstructive sleep apnea, adult (Primary)    2. Nocturnal hypoxemia  -     Overnight Sleep Oximetry Study; Future    Patient still has severe obstructive sleep apnea in spite of not  having supine or REM sleep.  We have discussed potential therapies again including CPAP, weight control, oral appliance, and surgery.  We have discussed the long-term consequences of untreated obstructive sleep apnea.  These include hypertension, diabetes, heart disease, stroke, and dementia.  After discussion she does wish to get a new CPAP machine.  We will place an order for that.  We will plan to see her back in 2 months.  We will also plan to proceed to the oximetry while she is on CPAP this see if further action is needed.  She may need to supplemental oxygen.    She is encouraged to lose weight.  She is encouraged avoid alcohol and sedatives close to bedtime.  She is encouraged to practice lateral position sleep.  I spent 25 minutes caring for Raquel on this date of service. This time includes time spent by me in the following activities:reviewing tests, obtaining and/or reviewing a separately obtained history, performing a medically appropriate examination and/or evaluation , counseling and educating the patient/family/caregiver, ordering medications, tests, or procedures and documenting information in the medical record  Follow Up   Return in about 2 months (around 5/28/2022) for 31 to 90 days after PAP setup, Next scheduled follow-up, Video visit.  Patient was given instructions and counseling regarding her condition or for health maintenance advice. Please see specific information pulled into the AVS if appropriate.   Guzman Majano MD [facility]  Sleep Medicine  Pulmonary and Critical Care Medicine

## 2022-04-20 DIAGNOSIS — G47.34 NOCTURNAL HYPOXEMIA: ICD-10-CM

## 2022-04-27 DIAGNOSIS — G47.34 NOCTURNAL HYPOXEMIA: Primary | ICD-10-CM

## 2022-04-27 NOTE — PROGRESS NOTES
Patient has significant nocturnal hypoxemia.  She will need to continue supplemental oxygen with her CPAP.  We will place order  Guzman Majano MD [facility]  Sleep Medicine  Pulmonary and Critical Care Medicine

## 2022-05-10 ENCOUNTER — TELEMEDICINE (OUTPATIENT)
Dept: SLEEP MEDICINE | Facility: HOSPITAL | Age: 67
End: 2022-05-10

## 2022-05-10 VITALS — HEIGHT: 68 IN | WEIGHT: 293 LBS | BODY MASS INDEX: 44.41 KG/M2

## 2022-05-10 DIAGNOSIS — G47.33 OBSTRUCTIVE SLEEP APNEA, ADULT: Primary | ICD-10-CM

## 2022-05-10 DIAGNOSIS — G47.34 NOCTURNAL HYPOXEMIA: ICD-10-CM

## 2022-05-10 PROCEDURE — 99213 OFFICE O/P EST LOW 20 MIN: CPT | Performed by: NURSE PRACTITIONER

## 2022-05-10 NOTE — PROGRESS NOTES
Chief Complaint:   Chief Complaint   Patient presents with   • Follow-up       HPI:    Raquel Carranza is a 66 y.o. female here for follow-up of sleep apnea.  Patient was last seen 3/28/2022 did need a new machine and needed a study to recertify.  She did receive her new machine after recertifying and feels she is doing well.  Patient did also show nocturnal hypoxemia and has been ordered O2 at 2 L to bleed into CPAP per the doctor.  We are still waiting to hear from her DME and I am going to reach out to them today to make sure this gets completed.  She does sleep 7 to 8 hours nightly but states she is currently having a rheumatoid arthritis flare which is keeping her up and down and crying out in the night with pain.  Normally she can sleep through the night.  She has an [place] score of 1/24.  She does feel CPAP is beneficial has no concerns or complaints and will continue use.        Current medications are:   Current Outpatient Medications:   •  allopurinol (ZYLOPRIM) 300 MG tablet, Take 300 mg by mouth Daily., Disp: , Rfl:   •  baclofen (LIORESAL) 20 MG tablet, Take 20 mg by mouth 3 (Three) Times a Day. Takes prn, Disp: , Rfl:   •  betamethasone dipropionate (DIPROLENE) 0.05 % cream, Apply  topically 2 (two) times a day., Disp: , Rfl:   •  DULoxetine (CYMBALTA) 30 MG capsule, Take 30 mg by mouth daily., Disp: , Rfl:   •  Ergocalciferol (VITAMIN D2 PO), Take 1.25 mg by mouth 1 (One) Time Per Week. FRIDAYS, Disp: , Rfl:   •  famotidine (PEPCID) 10 MG tablet, Take 10 mg by mouth Daily., Disp: , Rfl:   •  gabapentin (NEURONTIN) 300 MG capsule, Take 600 mg by mouth Every Evening., Disp: , Rfl:   •  levothyroxine (SYNTHROID, LEVOTHROID) 137 MCG tablet, Take 137 mcg by mouth Daily., Disp: , Rfl: 3  •  metoprolol succinate XL (TOPROL-XL) 25 MG 24 hr tablet, Take 25 mg by mouth daily., Disp: , Rfl:   •  mupirocin (BACTROBAN) 2 % ointment, Apply 1 application topically 3 (three) times a day., Disp: , Rfl:   •   oxyCODONE-acetaminophen (PERCOCET) 5-325 MG per tablet, Take 0.5 tablets by mouth Every 4 (Four) Hours As Needed for Moderate Pain ., Disp: 18 tablet, Rfl: 0  •  rivaroxaban (XARELTO) 20 MG tablet, Take 20 mg by mouth Daily., Disp: , Rfl:   •  spironolactone (ALDACTONE) 50 MG tablet, Take 75 mg by mouth Daily., Disp: , Rfl: .      The patient's relevant past medical, surgical, family and social history were reviewed and updated in Epic as appropriate.       Review of Systems   Eyes: Positive for visual disturbance.   Respiratory: Positive for apnea.    Cardiovascular: Positive for palpitations and leg swelling.   Musculoskeletal: Positive for arthralgias, joint swelling and myalgias.   Allergic/Immunologic: Positive for environmental allergies.   Neurological: Positive for dizziness, light-headedness and numbness.   Psychiatric/Behavioral: Positive for dysphoric mood and sleep disturbance.   All other systems reviewed and are negative.        Objective:    Physical Exam  Constitutional:       Appearance: Normal appearance.   HENT:      Head: Normocephalic and atraumatic.   Pulmonary:      Effort: Pulmonary effort is normal. No respiratory distress.   Neurological:      Mental Status: She is alert and oriented to person, place, and time.   Psychiatric:         Mood and Affect: Mood normal.         Behavior: Behavior normal.         Thought Content: Thought content normal.         Judgment: Judgment normal.     36/36 days of use  Greater than 4-hour use 100%  AHI of 0.1  95th percentile pressure 14.2  AHI of 0.1      ASSESSMENT/PLAN    Diagnoses and all orders for this visit:    1. Obstructive sleep apnea, adult (Primary)    2. Nocturnal hypoxemia            1. Counseled patient regarding multimodal approach with healthy nutrition, healthy sleep, regular physical activity, social activities, counseling, and medications. Encouraged to practice lateral sleep position. Avoid alcohol and sedatives close to  bedtime.  2. Patient will continue CPAP therapy I will reach out to [facility] to make sure they do have her oxygen ordered I will see her back in March of next year or sooner as symptoms warrant.  Patient gave consent for video visit.    I have reviewed the results of my evaluation and impression and discussed my recommendations in detail with the patient.      Signed by  Esperanza Almaraz, BRUCE    May 10, 2022      CC: Sommer Paniagua MD          No ref. provider found

## 2022-05-12 ENCOUNTER — TELEPHONE (OUTPATIENT)
Dept: SLEEP MEDICINE | Facility: HOSPITAL | Age: 67
End: 2022-05-12

## 2022-05-12 NOTE — TELEPHONE ENCOUNTER
Is nocturnal hypoxemia no longer a qualifying diagnosis? What diagnosis was her DME using? She did not correct with PAP therapy.

## 2022-05-12 NOTE — TELEPHONE ENCOUNTER
PATIENT CALLED AND ASKED THAT I INFORM PROVIDER THAT SHE HAS DECLINED TO ADD OXYGEN TO HER PAP THERAPY.  MEDICARE WILL NOT COVER THE MONTHLY EXPENSE DUE DISQUALIFYING DX.  PATIENT STATES THAT SHE CANNOT AFFORD AS PRIVATE PAY.

## 2022-05-13 NOTE — TELEPHONE ENCOUNTER
If Kayla says that having low oxygen is not a reason for medicare to give oxygen, could you ask amy what diagnoses are considered acceptable by medicare.

## 2022-05-19 NOTE — TELEPHONE ENCOUNTER
AFTER SPEAKING WITH [employer] -  MEDICARE WILL NOT COVER NOCTURNAL HYPOXEMIA.      MEDICARE WILL ONLY COVER OX FOR A CHRONIC LUNG DISEASE  Ie, CIRILO THURMAN

## 2022-06-01 ENCOUNTER — TRANSCRIBE ORDERS (OUTPATIENT)
Dept: ADMINISTRATIVE | Facility: HOSPITAL | Age: 67
End: 2022-06-01

## 2022-06-01 DIAGNOSIS — N93.8 DUB (DYSFUNCTIONAL UTERINE BLEEDING): Primary | ICD-10-CM

## 2022-07-12 DIAGNOSIS — G47.33 OSA (OBSTRUCTIVE SLEEP APNEA): Primary | ICD-10-CM

## 2022-07-14 ENCOUNTER — TELEPHONE (OUTPATIENT)
Dept: SLEEP MEDICINE | Facility: HOSPITAL | Age: 67
End: 2022-07-14

## 2022-07-14 DIAGNOSIS — G47.33 OSA (OBSTRUCTIVE SLEEP APNEA): Primary | ICD-10-CM

## 2022-07-14 NOTE — TELEPHONE ENCOUNTER
PATIENT STATES HER MASK IS RUBBING THE BRIDGE OF HER NOSE. PATIENT WOULD LIKE TO KNOW IF SHE CAN GET AN ORDER FOR A NEW MASK FAXED TO [facility]

## 2022-08-23 ENCOUNTER — PRE-ADMISSION TESTING (OUTPATIENT)
Dept: PREADMISSION TESTING | Facility: HOSPITAL | Age: 67
End: 2022-08-23

## 2022-08-23 LAB
B-HCG UR QL: NEGATIVE
BACTERIA UR QL AUTO: ABNORMAL /HPF
BILIRUB UR QL STRIP: NEGATIVE
CLARITY UR: ABNORMAL
COLOR UR: YELLOW
DEPRECATED RDW RBC AUTO: 52.5 FL (ref 37–54)
ERYTHROCYTE [DISTWIDTH] IN BLOOD BY AUTOMATED COUNT: 13.6 % (ref 12.3–15.4)
GLUCOSE UR STRIP-MCNC: NEGATIVE MG/DL
HCT VFR BLD AUTO: 51.5 % (ref 34–46.6)
HGB BLD-MCNC: 16.7 G/DL (ref 12–15.9)
HGB UR QL STRIP.AUTO: ABNORMAL
HYALINE CASTS UR QL AUTO: ABNORMAL /LPF
KETONES UR QL STRIP: ABNORMAL
LEUKOCYTE ESTERASE UR QL STRIP.AUTO: ABNORMAL
MCH RBC QN AUTO: 33.9 PG (ref 26.6–33)
MCHC RBC AUTO-ENTMCNC: 32.4 G/DL (ref 31.5–35.7)
MCV RBC AUTO: 104.5 FL (ref 79–97)
NITRITE UR QL STRIP: NEGATIVE
PH UR STRIP.AUTO: 6 [PH] (ref 5–8)
PLATELET # BLD AUTO: 213 10*3/MM3 (ref 140–450)
PMV BLD AUTO: 9.4 FL (ref 6–12)
POTASSIUM SERPL-SCNC: 5.2 MMOL/L (ref 3.5–5.2)
PROT UR QL STRIP: ABNORMAL
QT INTERVAL: 448 MS
QTC INTERVAL: 443 MS
RBC # BLD AUTO: 4.93 10*6/MM3 (ref 3.77–5.28)
RBC # UR STRIP: ABNORMAL /HPF
REF LAB TEST METHOD: ABNORMAL
SP GR UR STRIP: 1.02 (ref 1–1.03)
SQUAMOUS #/AREA URNS HPF: ABNORMAL /HPF
T4 SERPL-MCNC: 10.5 MCG/DL (ref 4.5–11.7)
TSH SERPL DL<=0.05 MIU/L-ACNC: 3.16 UIU/ML (ref 0.27–4.2)
UROBILINOGEN UR QL STRIP: ABNORMAL
WBC # UR STRIP: ABNORMAL /HPF
WBC NRBC COR # BLD: 7.91 10*3/MM3 (ref 3.4–10.8)

## 2022-08-23 PROCEDURE — 81001 URINALYSIS AUTO W/SCOPE: CPT

## 2022-08-23 PROCEDURE — 84443 ASSAY THYROID STIM HORMONE: CPT

## 2022-08-23 PROCEDURE — 81025 URINE PREGNANCY TEST: CPT

## 2022-08-23 PROCEDURE — 36415 COLL VENOUS BLD VENIPUNCTURE: CPT

## 2022-08-23 PROCEDURE — 84436 ASSAY OF TOTAL THYROXINE: CPT

## 2022-08-23 PROCEDURE — 84132 ASSAY OF SERUM POTASSIUM: CPT

## 2022-08-23 PROCEDURE — 93010 ELECTROCARDIOGRAM REPORT: CPT | Performed by: INTERNAL MEDICINE

## 2022-08-23 PROCEDURE — 93005 ELECTROCARDIOGRAM TRACING: CPT

## 2022-08-23 PROCEDURE — 85027 COMPLETE CBC AUTOMATED: CPT

## 2022-08-23 NOTE — DISCHARGE INSTRUCTIONS
The following information and instructions were given:    Nothing to eat or drink after midnight except sips of water with routine prescribed medication (except blood thinner, certain blood pressure medications, diabetes, or weight reducing medication) unless otherwise instructed by your physician.  Do not eat, drink, smoke or chew gum after midnight the night before surgery. This also includes no mints.    EXCEPTION: [facility]AS patients Patient instructed to drink 20 ounces (or until full) of Gatorade and it needs to be completed 1 hour before given arrival time on the day of surgery. (NO RED Gatorade)    Patient verbalized understanding.    DO NOT shave for two days before your procedure.  Do not wear makeup.      DO NOT wear fingernail polish (gel/regular) and/or acrylic/artificial nails on the day of surgery.   If a patient had recent manicure and would rather not remove polish or artificial nails, then the minimum requirement is that the polish/artificial nails must be removed from the middle finger on each hand.      If patient was having surgery on an upper extremity, then the patient was instructed that fingernail polish/artificial fingernails must be removed for surgery.  NO EXCEPTIONS.      If patient was having surgery on a lower extremity, then the patient was instructed that toenail polish on both extremities must be removed for surgery.  NO EXCEPTIONS.    Remove all jewelry (advised to go to jeweler if unable to remove).  Jewelry especially rings can no longer be taped for surgery.    Leave anything you consider valuable at home.      Bring the following with you (if applicable)   -picture ID and insurance cards   -Co-pay/deductible required by insurance   -Medications in the original bottles (not a list) including all over-the-counter meds     Patient must have a  for transportation home after procedure.  It must be an   adult that will take responsibility for care for 24 hours after surgery.

## 2022-08-26 PROCEDURE — 88305 TISSUE EXAM BY PATHOLOGIST: CPT | Performed by: OBSTETRICS & GYNECOLOGY

## 2022-08-27 ENCOUNTER — LAB REQUISITION (OUTPATIENT)
Dept: LAB | Facility: HOSPITAL | Age: 67
End: 2022-08-27

## 2022-08-27 DIAGNOSIS — N84.0 POLYP OF CORPUS UTERI: ICD-10-CM

## 2022-09-12 ENCOUNTER — TELEPHONE (OUTPATIENT)
Dept: GASTROENTEROLOGY | Facility: CLINIC | Age: 67
End: 2022-09-12

## 2022-09-12 ENCOUNTER — TRANSCRIBE ORDERS (OUTPATIENT)
Dept: ADMINISTRATIVE | Facility: HOSPITAL | Age: 67
End: 2022-09-12

## 2022-09-12 DIAGNOSIS — Z12.31 VISIT FOR SCREENING MAMMOGRAM: Primary | ICD-10-CM

## 2022-09-12 NOTE — TELEPHONE ENCOUNTER
PATIENT CALLED AND LEFT A VOICEMAIL, UPON RETURNING IT SHE INFORMED ME THAT SHE WOULD LIKE TO SCHEDULE WITH DR. CALLE AGAIN. SHE SAID HER  WAS LIMITED ON DAYS HE COULD BRING HER LAST TIME AND HER BMI WAS OVER 50 LAST TIME SO SHE HAD TO SCHEDULE AT THE HOSPITAL WITH DR. GOVEA.     BUT, SHE HAS SINCE THEN, LOST WEIGHT AND HER BMI HAS LOWERED MAKING HER AN ELIGIBLE CANDIDATE TO HAVE HER PROCEDURE PERFORMED AT [facility].     SHE WILL BE SCHEDULING HER ONE YEAR FOLLOW UP COLON WITH DR. CALLE AT THIS TIME AS SHE PREFERS TO STAY WITH HIM.

## 2022-10-12 ENCOUNTER — HOSPITAL ENCOUNTER (OUTPATIENT)
Dept: MAMMOGRAPHY | Facility: HOSPITAL | Age: 67
Discharge: HOME | End: 2022-10-12
Admitting: FAMILY MEDICINE

## 2022-10-12 DIAGNOSIS — Z12.31 VISIT FOR SCREENING MAMMOGRAM: ICD-10-CM

## 2022-10-12 PROCEDURE — 77067 SCR MAMMO BI INCL CAD: CPT

## 2022-10-12 PROCEDURE — 77063 BREAST TOMOSYNTHESIS BI: CPT | Performed by: RADIOLOGY

## 2022-10-12 PROCEDURE — 77063 BREAST TOMOSYNTHESIS BI: CPT

## 2022-10-12 PROCEDURE — 77067 SCR MAMMO BI INCL CAD: CPT | Performed by: RADIOLOGY

## 2022-10-31 RX ORDER — SODIUM, POTASSIUM,MAG SULFATES 17.5-3.13G
SOLUTION, RECONSTITUTED, ORAL ORAL
Qty: 354 ML | Refills: 0 | Status: SHIPPED | OUTPATIENT
Start: 2022-10-31

## 2022-11-29 ENCOUNTER — HOSPITAL ENCOUNTER (OUTPATIENT)
Dept: ULTRASOUND IMAGING | Facility: HOSPITAL | Age: 67
Discharge: HOME | End: 2022-11-29

## 2022-11-29 ENCOUNTER — HOSPITAL ENCOUNTER (OUTPATIENT)
Dept: MAMMOGRAPHY | Facility: HOSPITAL | Age: 67
Discharge: HOME | End: 2022-11-29

## 2022-11-29 DIAGNOSIS — R92.8 ABNORMAL MAMMOGRAM: ICD-10-CM

## 2022-11-29 PROCEDURE — 77066 DX MAMMO INCL CAD BI: CPT

## 2022-11-29 PROCEDURE — 77066 DX MAMMO INCL CAD BI: CPT | Performed by: RADIOLOGY

## 2022-11-29 PROCEDURE — 76641 ULTRASOUND BREAST COMPLETE: CPT | Performed by: RADIOLOGY

## 2022-11-29 PROCEDURE — 76642 ULTRASOUND BREAST LIMITED: CPT

## 2022-11-29 PROCEDURE — 76641 ULTRASOUND BREAST COMPLETE: CPT

## 2022-11-29 PROCEDURE — G0279 TOMOSYNTHESIS, MAMMO: HCPCS

## 2022-11-29 PROCEDURE — 76642 ULTRASOUND BREAST LIMITED: CPT | Performed by: RADIOLOGY

## 2022-11-29 PROCEDURE — G0279 TOMOSYNTHESIS, MAMMO: HCPCS | Performed by: RADIOLOGY

## 2022-11-30 ENCOUNTER — TRANSCRIBE ORDERS (OUTPATIENT)
Dept: MAMMOGRAPHY | Facility: HOSPITAL | Age: 67
End: 2022-11-30

## 2022-11-30 DIAGNOSIS — R92.8 ABNORMAL MAMMOGRAM: Primary | ICD-10-CM

## 2023-07-28 ENCOUNTER — CONSULT (OUTPATIENT)
Dept: ONCOLOGY | Facility: CLINIC | Age: 68
End: 2023-07-28
Payer: MEDICARE

## 2023-07-28 VITALS
DIASTOLIC BLOOD PRESSURE: 81 MMHG | TEMPERATURE: 96.9 F | OXYGEN SATURATION: 91 % | RESPIRATION RATE: 24 BRPM | WEIGHT: 293 LBS | HEIGHT: 67 IN | SYSTOLIC BLOOD PRESSURE: 135 MMHG | BODY MASS INDEX: 45.99 KG/M2 | HEART RATE: 55 BPM

## 2023-07-28 DIAGNOSIS — D75.1 POLYCYTHEMIA, SECONDARY: Primary | ICD-10-CM

## 2023-07-28 PROCEDURE — 1126F AMNT PAIN NOTED NONE PRSNT: CPT | Performed by: INTERNAL MEDICINE

## 2023-07-28 PROCEDURE — 3079F DIAST BP 80-89 MM HG: CPT | Performed by: INTERNAL MEDICINE

## 2023-07-28 PROCEDURE — 3075F SYST BP GE 130 - 139MM HG: CPT | Performed by: INTERNAL MEDICINE

## 2023-07-28 PROCEDURE — 99204 OFFICE O/P NEW MOD 45 MIN: CPT | Performed by: INTERNAL MEDICINE

## 2023-07-28 NOTE — LETTER
July 28, 2023       No Recipients    Patient: Raquel Carranza   YOB: 1955   Date of Visit: 7/28/2023     Dear Sommer Paniagua MD:       Thank you for referring Raquel Carranza to me for evaluation. Below are the relevant portions of my assessment and plan of care.    If you have questions, please do not hesitate to call me. I look forward to following Raquel along with you.         Sincerely,        Sugey Smiley MD        CC:   No Recipients    Sugey Smiley MD  07/28/23 1325  Sign when Signing Visit  ID: 67 y.o. year old female from [place] [id number]    PCP: Sommer Paniagua MD    REFERRING PHYSICIAN: Sommer Paniagua MD    Reason for Consultation: Polycythemia secondary to sleep apnea    Dear Dr. Paniagua    It is a pleasure to meet Ms. Carranza today.  She is a very pleasant 67-year-old lady who presents today for consultation for persistent polycythemia.  She does wear a CPAP machine at night.  She has been on CPAP for quite some time and last year she had the settings adjusted.  However in spite of adjusting the settings she will continue to be hypoxemic on the sleep study.  And there was some suggestion that she may require oxygen at 2 L/min attached to her CPAP.  That had not been done.  She presents now with persistent polycythemia.      Past Medical History:   Diagnosis Date   • Abnormal glucose    • Acquired hypothyroidism    • Anxiety    • Arrhythmia    • Arthritis    • Bilateral edema of lower extremity    • Chronic joint pain    • History of sepsis    • History of transfusion     no reactions per pt   • Hypertension    • Iron deficiency anemia    • Irritable bowel syndrome    • 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 rheumatoid factor    • Sjogren's disease    • Sleep apnea     CPAP NIGHTLY   • Thrombosis of right saphenous vein    • Vitamin D deficiency        Past Surgical History:   Procedure Laterality Date   • APPENDECTOMY     • BREAST CYST EXCISION     •  CARDIAC CATHETERIZATION      no interventions   • COLONOSCOPY     • COLONOSCOPY N/A 10/05/2021    Procedure: Colonoscopy with polypectomy;  Surgeon: Adam Martinez MD;  Location:  [facility];  Service: Gastroenterology;  Laterality: N/A;   • ENDOSCOPY     • HERNIA REPAIR     • LEG EXCISION LESION/CYST Left 02/26/2021    Procedure: INCISION AND DRAINAGE LEFT THIGH ABCESS;  Surgeon: Noe Ken MD;  Location:  [facility];  Service: General;  Laterality: Left;   • LEG EXCISION LESION/CYST Left 02/27/2021    Procedure: WOUND EXPLORATION LEFT THIGH FOR CONTROL OF BLEEDING;  Surgeon: Sreedhar Bobby MD;  Location:  [facility];  Service: General;  Laterality: Left;       Social History     Socioeconomic History   • Marital status:    Tobacco Use   • Smoking status: Every Day     Packs/day: 0.25     Years: 50.00     Pack years: 12.50     Types: Cigarettes   • Smokeless tobacco: Never   Vaping Use   • Vaping Use: Never used   Substance and Sexual Activity   • Alcohol use: No   • Drug use: Never   • Sexual activity: Defer       Family History   Problem Relation Age of Onset   • Ovarian cancer Mother         unknown   • Cancer Mother         leukemia   • Hypertension Father    • Stroke Father    • Heart disease Father    • Cancer Father         colon   • Diabetes Brother    • Diabetes Paternal Aunt    • Diabetes Paternal Uncle    • Breast cancer Neg Hx        Review of Systems:    16 point review of systems was performed and reviewed and scanned into the EMR    Review of Systems   Constitutional:  Positive for fatigue.   Eyes: Negative.    Respiratory: Negative.     Cardiovascular: Negative.    Gastrointestinal: Negative.    Endocrine: Negative.    Genitourinary: Negative.     Musculoskeletal: Negative.    Skin: Negative.    Neurological: Negative.    Hematological: Negative.    Psychiatric/Behavioral: Negative.         Current Outpatient Medications:   •  allopurinol (ZYLOPRIM) 300 MG tablet, Take  1 tablet by mouth Daily., Disp: , Rfl:   •  betamethasone dipropionate (DIPROLENE) 0.05 % cream, Apply  topically 2 (two) times a day., Disp: , Rfl:   •  DULoxetine (CYMBALTA) 30 MG capsule, Take 1 capsule by mouth Daily., Disp: , Rfl:   •  Ergocalciferol (VITAMIN D2 PO), Take 1.25 mg by mouth 1 (One) Time Per Week. FRIDAYS, Disp: , Rfl:   •  famotidine (PEPCID) 10 MG tablet, Take 1 tablet by mouth Daily., Disp: , Rfl:   •  gabapentin (NEURONTIN) 300 MG capsule, Take 2 capsules by mouth Every Evening., Disp: , Rfl:   •  levothyroxine (SYNTHROID, LEVOTHROID) 137 MCG tablet, Take 1 tablet by mouth Daily., Disp: , Rfl: 3  •  metoprolol succinate XL (TOPROL-XL) 25 MG 24 hr tablet, Take 1 tablet by mouth Daily., Disp: , Rfl:   •  mupirocin (BACTROBAN) 2 % ointment, Apply 1 application  topically to the appropriate area as directed 3 (Three) Times a Day., Disp: , Rfl:   •  oxyCODONE-acetaminophen (PERCOCET) 5-325 MG per tablet, Take 0.5 tablets by mouth Every 4 (Four) Hours As Needed for Moderate Pain ., Disp: 18 tablet, Rfl: 0  •  rivaroxaban (XARELTO) 20 MG tablet, Take 1 tablet by mouth Daily., Disp: , Rfl:   •  sodium-potassium-magnesium sulfates (Suprep Bowel Prep Kit) 17.5-3.13-1.6 GM/177ML solution oral solution, Use as directed by provider for colonoscopy prep, Disp: 354 mL, Rfl: 0  •  spironolactone (ALDACTONE) 50 MG tablet, Take 1.5 tablets by mouth Daily., Disp: , Rfl:     Pain Medications               DULoxetine (CYMBALTA) 30 MG capsule Take 1 capsule by mouth Daily.    gabapentin (NEURONTIN) 300 MG capsule Take 2 capsules by mouth Every Evening.    oxyCODONE-acetaminophen (PERCOCET) 5-325 MG per tablet Take 0.5 tablets by mouth Every 4 (Four) Hours As Needed for Moderate Pain .             Allergies   Allergen Reactions   • Ace Inhibitors Other (See Comments)     Boils   • Adacel [Tetanus-Diphth-Acell Pertussis] Swelling     Preservative in the vaccine. Fever and redness.    • Penicillins Shortness Of Breath  and Itching   • Zostavax [Zoster Vaccine Live] Swelling     Preservative in the vaccine   • Lisinopril Unknown - Low Severity   • Thimerosal Rash         ECOG score: 1           Objective    Vitals:    07/28/23 1256   BP: 135/81   Pulse: 55   Resp: 24   Temp: 96.9 °F (36.1 °C)   SpO2: 91%     Body mass index is 49.02 kg/m².  Body surface area is 2.45 meters squared.        07/28/23  1256   Weight: (!) 142 kg (313 lb)     Pain Score    07/28/23 1256   PainSc: 0-No pain          Physical Exam    General: well appearing, in no acute distress  HEENT: sclera anicteric, oropharynx clear, neck is supple  Lymphatics: no cervical, supraclavicular, or axillary adenopathy  Cardiovascular: regular rate and rhythm, no murmurs, rubs or gallops  Lungs: clear to auscultation bilaterally  Abdomen: soft, nontender, nondistended.  No palpable organomegaly  Extremities: no lower extremity edema  Skin: no rashes, lesions, bruising, or petechiae  Msk:  Shows no weakness of the large muscle groups  Psych: Mood is stable        Lab Results   Component Value Date    GLUCOSE 94 03/03/2021    BUN 10 03/03/2021    CREATININE 0.71 03/03/2021     03/03/2021    K 5.2 08/23/2022     03/03/2021    CO2 32.0 (H) 03/03/2021    CALCIUM 8.8 03/03/2021    PROTEINTOT 5.5 (L) 02/22/2021    ALBUMIN 2.40 (L) 02/22/2021    BILITOT 0.4 02/22/2021    ALKPHOS 111 02/22/2021    AST 10 02/22/2021    ALT 7 02/22/2021       Lab Results   Component Value Date    HGB 16.7 (H) 08/23/2022    HCT 51.5 (H) 08/23/2022    .5 (H) 08/23/2022     08/23/2022    WBC 7.91 08/23/2022    NEUTROABS 6.46 02/25/2021    LYMPHSABS 1.04 02/25/2021    MONOSABS 1.02 (H) 02/25/2021    EOSABS 0.27 02/25/2021    BASOSABS 0.10 02/25/2021       No Images in the past 120 days found..      Assessment     1.  Persistent polycythemia secondary to hypoxemia at night.  In spite of CPAP she continues to be hypoxic at night.  I have recommended oxygen to be added to her CPAP  machine.  This may correct her numbers.  She does not require any phlebotomies since this is a secondary polycythemia.  I will send a message to her sleep medicine providers to see if they can help with obtaining oxygen at night.  I will check her in 6 months to make sure that this is correcting her polycythemia.  And hopefully does.          Thank you for allowing me to participate in the care of this patient.    Yours sincerely,    Sugey Smiley MD  [facility]  Hematology and Oncology    Return in (Approximately): 6 months    Orders Placed This Encounter   Procedures   • CBC & Differential     Standing Status:   Future     Standing Expiration Date:   7/28/2024     Order Specific Question:   Manual Differential     Answer:   No     Order Specific Question:   Release to patient     Answer:   Routine Release

## 2023-07-28 NOTE — PROGRESS NOTES
ID: 67 y.o. year old female from [place] [id number]    PCP: Sommer Paniagua MD    REFERRING PHYSICIAN: Sommer Paniagua MD    Reason for Consultation: Polycythemia secondary to sleep apnea    Dear Dr. Paniagua    It is a pleasure to meet Ms. Carranza today.  She is a very pleasant 67-year-old lady who presents today for consultation for persistent polycythemia.  She does wear a CPAP machine at night.  She has been on CPAP for quite some time and last year she had the settings adjusted.  However in spite of adjusting the settings she will continue to be hypoxemic on the sleep study.  And there was some suggestion that she may require oxygen at 2 L/min attached to her CPAP.  That had not been done.  She presents now with persistent polycythemia.      Past Medical History:   Diagnosis Date    Abnormal glucose     Acquired hypothyroidism     Anxiety     Arrhythmia     Arthritis     Bilateral edema of lower extremity     Chronic joint pain     History of sepsis     History of transfusion     no reactions per pt    Hypertension     Iron deficiency anemia     Irritable bowel syndrome     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 rheumatoid factor     Sjogren's disease     Sleep apnea     CPAP NIGHTLY    Thrombosis of right saphenous vein     Vitamin D deficiency        Past Surgical History:   Procedure Laterality Date    APPENDECTOMY      BREAST CYST EXCISION      CARDIAC CATHETERIZATION      no interventions    COLONOSCOPY      COLONOSCOPY N/A 10/05/2021    Procedure: Colonoscopy with polypectomy;  Surgeon: Adam Martinez MD;  Location: [facility] ENDOSCOPY;  Service: Gastroenterology;  Laterality: N/A;    ENDOSCOPY      HERNIA REPAIR      LEG EXCISION LESION/CYST Left 02/26/2021    Procedure: INCISION AND DRAINAGE LEFT THIGH ABCESS;  Surgeon: Noe Ken MD;  Location: [facility] OR;  Service: General;  Laterality: Left;    LEG EXCISION LESION/CYST Left 02/27/2021    Procedure: WOUND EXPLORATION  LEFT THIGH FOR CONTROL OF BLEEDING;  Surgeon: Sreedhar Bobby MD;  Location: [facility] beds [facility] the [facility];  Service: General;  Laterality: Left;       Social History     Socioeconomic History    Marital status:    Tobacco Use    Smoking status: Every Day     Packs/day: 0.25     Years: 50.00     Pack years: 12.50     Types: Cigarettes    Smokeless tobacco: Never   Vaping Use    Vaping Use: Never used   Substance and Sexual Activity    Alcohol use: No    Drug use: Never    Sexual activity: Defer       Family History   Problem Relation Age of Onset    Ovarian cancer Mother         unknown    Cancer Mother         leukemia    Hypertension Father     Stroke Father     Heart disease Father     Cancer Father         colon    Diabetes Brother     Diabetes Paternal Aunt     Diabetes Paternal Uncle     Breast cancer Neg Hx        Review of Systems:    16 point review of systems was performed and reviewed and scanned into the EMR    Review of Systems   Constitutional:  Positive for fatigue.   Eyes: Negative.    Respiratory: Negative.     Cardiovascular: Negative.    Gastrointestinal: Negative.    Endocrine: Negative.    Genitourinary: Negative.     Musculoskeletal: Negative.    Skin: Negative.    Neurological: Negative.    Hematological: Negative.    Psychiatric/Behavioral: Negative.         Current Outpatient Medications:     allopurinol (ZYLOPRIM) 300 MG tablet, Take 1 tablet by mouth Daily., Disp: , Rfl:     betamethasone dipropionate (DIPROLENE) 0.05 % cream, Apply  topically 2 (two) times a day., Disp: , Rfl:     DULoxetine (CYMBALTA) 30 MG capsule, Take 1 capsule by mouth Daily., Disp: , Rfl:     Ergocalciferol (VITAMIN D2 PO), Take 1.25 mg by mouth 1 (One) Time Per Week. FRIDAYS, Disp: , Rfl:     famotidine (PEPCID) 10 MG tablet, Take 1 tablet by mouth Daily., Disp: , Rfl:     gabapentin (NEURONTIN) 300 MG capsule, Take 2 capsules by mouth Every Evening., Disp: , Rfl:     levothyroxine (SYNTHROID, LEVOTHROID) 137 MCG tablet,  Take 1 tablet by mouth Daily., Disp: , Rfl: 3    metoprolol succinate XL (TOPROL-XL) 25 MG 24 hr tablet, Take 1 tablet by mouth Daily., Disp: , Rfl:     mupirocin (BACTROBAN) 2 % ointment, Apply 1 application  topically to the appropriate area as directed 3 (Three) Times a Day., Disp: , Rfl:     oxyCODONE-acetaminophen (PERCOCET) 5-325 MG per tablet, Take 0.5 tablets by mouth Every 4 (Four) Hours As Needed for Moderate Pain ., Disp: 18 tablet, Rfl: 0    rivaroxaban (XARELTO) 20 MG tablet, Take 1 tablet by mouth Daily., Disp: , Rfl:     sodium-potassium-magnesium sulfates (Suprep Bowel Prep Kit) 17.5-3.13-1.6 GM/177ML solution oral solution, Use as directed by provider for colonoscopy prep, Disp: 354 mL, Rfl: 0    spironolactone (ALDACTONE) 50 MG tablet, Take 1.5 tablets by mouth Daily., Disp: , Rfl:     Pain Medications               DULoxetine (CYMBALTA) 30 MG capsule Take 1 capsule by mouth Daily.    gabapentin (NEURONTIN) 300 MG capsule Take 2 capsules by mouth Every Evening.    oxyCODONE-acetaminophen (PERCOCET) 5-325 MG per tablet Take 0.5 tablets by mouth Every 4 (Four) Hours As Needed for Moderate Pain .             Allergies   Allergen Reactions    Ace Inhibitors Other (See Comments)     Boils    Adacel [Tetanus-Diphth-Acell Pertussis] Swelling     Preservative in the vaccine. Fever and redness.     Penicillins Shortness Of Breath and Itching    Zostavax [Zoster Vaccine Live] Swelling     Preservative in the vaccine    Lisinopril Unknown - Low Severity    Thimerosal Rash         ECOG score: 1           Objective     Vitals:    07/28/23 1256   BP: 135/81   Pulse: 55   Resp: 24   Temp: 96.9 °F (36.1 °C)   SpO2: 91%     Body mass index is 49.02 kg/m².  Body surface area is 2.45 meters squared.        07/28/23 1256   Weight: (!) 142 kg (313 lb)     Pain Score    07/28/23 1256   PainSc: 0-No pain          Physical Exam    General: well appearing, in no acute distress  HEENT: sclera anicteric, oropharynx clear,  neck is supple  Lymphatics: no cervical, supraclavicular, or axillary adenopathy  Cardiovascular: regular rate and rhythm, no murmurs, rubs or gallops  Lungs: clear to auscultation bilaterally  Abdomen: soft, nontender, nondistended.  No palpable organomegaly  Extremities: no lower extremity edema  Skin: no rashes, lesions, bruising, or petechiae  Msk:  Shows no weakness of the large muscle groups  Psych: Mood is stable        Lab Results   Component Value Date    GLUCOSE 94 03/03/2021    BUN 10 03/03/2021    CREATININE 0.71 03/03/2021     03/03/2021    K 5.2 08/23/2022     03/03/2021    CO2 32.0 (H) 03/03/2021    CALCIUM 8.8 03/03/2021    PROTEINTOT 5.5 (L) 02/22/2021    ALBUMIN 2.40 (L) 02/22/2021    BILITOT 0.4 02/22/2021    ALKPHOS 111 02/22/2021    AST 10 02/22/2021    ALT 7 02/22/2021       Lab Results   Component Value Date    HGB 16.7 (H) 08/23/2022    HCT 51.5 (H) 08/23/2022    .5 (H) 08/23/2022     08/23/2022    WBC 7.91 08/23/2022    NEUTROABS 6.46 02/25/2021    LYMPHSABS 1.04 02/25/2021    MONOSABS 1.02 (H) 02/25/2021    EOSABS 0.27 02/25/2021    BASOSABS 0.10 02/25/2021       No Images in the past 120 days found..      Assessment      1.  Persistent polycythemia secondary to hypoxemia at night.  In spite of CPAP she continues to be hypoxic at night.  I have recommended oxygen to be added to her CPAP machine.  This may correct her numbers.  She does not require any phlebotomies since this is a secondary polycythemia.  I will send a message to her sleep medicine providers to see if they can help with obtaining oxygen at night.  I will check her in 6 months to make sure that this is correcting her polycythemia.  And hopefully does.          Thank you for allowing me to participate in the care of this patient.    Yours sincerely,    Sugey Smiley MD  [facility]  Hematology and Oncology    Return in (Approximately): 6 months    Orders Placed This Encounter    Procedures    CBC & Differential     Standing Status:   Future     Standing Expiration Date:   7/28/2024     Order Specific Question:   Manual Differential     Answer:   No     Order Specific Question:   Release to patient     Answer:   Routine Release

## 2023-07-28 NOTE — ACP (ADVANCE CARE PLANNING)
Advance Care Planning   ACP discussion was held with the patient during this visit. Patient has an advance directive (not in EMR), copy requested.        negative

## 2023-08-04 ENCOUNTER — PREP FOR SURGERY (OUTPATIENT)
Dept: OTHER | Facility: HOSPITAL | Age: 68
End: 2023-08-04
Payer: MEDICARE

## 2023-08-04 DIAGNOSIS — R09.02 HYPOXIA: ICD-10-CM

## 2023-08-04 DIAGNOSIS — Z12.11 COLON CANCER SCREENING: Primary | ICD-10-CM

## 2023-08-04 DIAGNOSIS — Z86.718 HISTORY OF DEEP VEIN THROMBOSIS OF LOWER EXTREMITY: ICD-10-CM

## 2023-08-04 DIAGNOSIS — I50.1 PULMONARY EDEMA CARDIAC CAUSE: ICD-10-CM

## 2023-08-04 DIAGNOSIS — I49.9 CARDIAC ARRHYTHMIA, UNSPECIFIED CARDIAC ARRHYTHMIA TYPE: ICD-10-CM

## 2023-08-04 RX ORDER — DEXTROSE, SODIUM CHLORIDE, SODIUM LACTATE, POTASSIUM CHLORIDE, AND CALCIUM CHLORIDE 5; .6; .31; .03; .02 G/100ML; G/100ML; G/100ML; G/100ML; G/100ML
100 INJECTION, SOLUTION INTRAVENOUS CONTINUOUS
OUTPATIENT
Start: 2023-08-04

## 2023-08-04 RX ORDER — SODIUM CHLORIDE 0.9 % (FLUSH) 0.9 %
3 SYRINGE (ML) INJECTION EVERY 12 HOURS SCHEDULED
OUTPATIENT
Start: 2023-08-04

## 2023-08-04 RX ORDER — SODIUM CHLORIDE 9 MG/ML
40 INJECTION, SOLUTION INTRAVENOUS AS NEEDED
OUTPATIENT
Start: 2023-08-04

## 2023-08-04 RX ORDER — SODIUM CHLORIDE 0.9 % (FLUSH) 0.9 %
10 SYRINGE (ML) INJECTION AS NEEDED
OUTPATIENT
Start: 2023-08-04

## 2023-08-08 ENCOUNTER — TELEPHONE (OUTPATIENT)
Dept: SLEEP MEDICINE | Facility: HOSPITAL | Age: 68
End: 2023-08-08

## 2023-08-08 NOTE — TELEPHONE ENCOUNTER
Caller: Raquel Carranza    Relationship: Self    Best call back number: [phone]    Equipment requested: OXYGEN THAT BLEEDS IN TO HER C-PAP MACHINE    Reason for the request: NOCTURNAL HYPOXEMIA    Prescribing Provider: DR. HURD     Additional information or concerns: PT STATED THAT SHE HAS TOO MANY RED BLOOD CELLS IN HER BODY. PT STATED THAT SHE GETS TIRED ALL THE TIME. DR. HURD IS THE ONE WHO DX HER. PLEASE CALL THE PT BACK WITH AN UPDATE AND IF SHE CAN NOT BE REACHED THEN LEAVE HER A VM.

## 2023-08-09 ENCOUNTER — TELEMEDICINE (OUTPATIENT)
Dept: SLEEP MEDICINE | Facility: HOSPITAL | Age: 68
End: 2023-08-09
Payer: MEDICARE

## 2023-08-09 VITALS — WEIGHT: 293 LBS | HEIGHT: 68 IN | BODY MASS INDEX: 44.41 KG/M2

## 2023-08-09 DIAGNOSIS — G47.34 NOCTURNAL HYPOXEMIA: ICD-10-CM

## 2023-08-09 DIAGNOSIS — G47.33 OBSTRUCTIVE SLEEP APNEA, ADULT: Primary | ICD-10-CM

## 2023-08-09 NOTE — PROGRESS NOTES
Chief Complaint:   Chief Complaint   Patient presents with    Follow-up       HPI:    Raquel Carranza is a 67 y.o. female here for follow-up of apnea and nocturnal hypoxemia.  Patient was last seen 5/10/2022 and in the past was on O2 at 2 L bleeding into CPAP.  Patient has not had a order for that in some time and is no longer using.  Patient states she did see hematology who states hemoglobin was very low and did strongly encourage oxygen be added back onto CPAP at night.  Will get that ordered for her today.  She does sleep 7 to 8 hours nightly and does toss and turn at times due to rheumatoid arthritis pain otherwise she will not get up in the night.  Patient has an [place] score of 1/24.  She is doing well but wishes to have her oxygen reinstated.        Current medications are:   Current Outpatient Medications:     allopurinol (ZYLOPRIM) 300 MG tablet, Take 1 tablet by mouth Daily., Disp: , Rfl:     betamethasone dipropionate (DIPROLENE) 0.05 % cream, Apply  topically 2 (two) times a day., Disp: , Rfl:     DULoxetine (CYMBALTA) 30 MG capsule, Take 1 capsule by mouth Daily., Disp: , Rfl:     Ergocalciferol (VITAMIN D2 PO), Take 1.25 mg by mouth 1 (One) Time Per Week. FRIDAYS, Disp: , Rfl:     famotidine (PEPCID) 10 MG tablet, Take 1 tablet by mouth Daily., Disp: , Rfl:     gabapentin (NEURONTIN) 300 MG capsule, Take 2 capsules by mouth Every Evening., Disp: , Rfl:     levothyroxine (SYNTHROID, LEVOTHROID) 137 MCG tablet, Take 1 tablet by mouth Daily., Disp: , Rfl: 3    metoprolol succinate XL (TOPROL-XL) 25 MG 24 hr tablet, Take 1 tablet by mouth Daily., Disp: , Rfl:     mupirocin (BACTROBAN) 2 % ointment, Apply 1 application  topically to the appropriate area as directed 3 (Three) Times a Day., Disp: , Rfl:     oxyCODONE-acetaminophen (PERCOCET) 5-325 MG per tablet, Take 0.5 tablets by mouth Every 4 (Four) Hours As Needed for Moderate Pain ., Disp: 18 tablet, Rfl: 0    rivaroxaban (XARELTO) 20 MG tablet, Take  1 tablet by mouth Daily., Disp: , Rfl:     spironolactone (ALDACTONE) 50 MG tablet, Take 1.5 tablets by mouth Daily., Disp: , Rfl: .      The patient's relevant past medical, surgical, family and social history were reviewed and updated in Epic as appropriate.       Review of Systems   Eyes:  Positive for visual disturbance.   Respiratory:  Positive for apnea.    Cardiovascular:  Positive for palpitations and leg swelling.   Musculoskeletal:  Positive for arthralgias, joint swelling and myalgias.   Allergic/Immunologic: Positive for environmental allergies.   Neurological:  Positive for dizziness, light-headedness and numbness.   Psychiatric/Behavioral:  Positive for dysphoric mood and sleep disturbance.    All other systems reviewed and are negative.      Objective:    Physical Exam  Constitutional:       Appearance: Normal appearance.   HENT:      Head: Normocephalic and atraumatic.   Pulmonary:      Effort: Pulmonary effort is normal. No respiratory distress.   Neurological:      Mental Status: She is alert and oriented to person, place, and time.   Psychiatric:         Mood and Affect: Mood normal.         Behavior: Behavior normal.         Thought Content: Thought content normal.         Judgment: Judgment normal.       CPAP Report    17/30 days of use  Greater than 4-hour use 53%  Settings 10-20  95th percentile pressure 11.5  AHI of 0.1  The patient continues to use and benefit from CPAP therapy.    ASSESSMENT/PLAN    Diagnoses and all orders for this visit:    1. Obstructive sleep apnea, adult (Primary)  -     PAP Therapy  -     Oxygen Therapy    2. Nocturnal hypoxemia  -     Oxygen Therapy        Counseled patient regarding multimodal approach with healthy nutrition, healthy sleep, regular physical activity, social activities, counseling, and medications. Encouraged to practice lateral sleep position. Avoid alcohol and sedatives close to bedtime.      Refill supplies x 1 year.  O2 at 2 L to bleed into CPAP  at night we will see her back in 1 year or sooner as symptoms warrant.  Patient gave consent for video visit.  I have reviewed the results of my evaluation and impression and discussed my recommendations in detail with the patient.      Signed by  BRUCE Laughlin    August 9, 2023      CC: Sommer Paniagua MD         No ref. provider found

## 2023-08-10 DIAGNOSIS — G47.34 NOCTURNAL HYPOXEMIA: ICD-10-CM

## 2023-08-10 DIAGNOSIS — G47.33 OBSTRUCTIVE SLEEP APNEA, ADULT: Primary | ICD-10-CM

## 2023-08-16 ENCOUNTER — TELEPHONE (OUTPATIENT)
Dept: SLEEP MEDICINE | Facility: HOSPITAL | Age: 68
End: 2023-08-16
Payer: MEDICARE

## 2023-08-16 NOTE — TELEPHONE ENCOUNTER
PATIENT CALLED RE THE OXYGEN ORDER PLACED FROM HER 08/10/23 VISIT.    ACCORDING TO JUAN, PT NEEDS TITRATION STUDY PRIOR TO INS AUTH OXYGEN ORDER.      PATIENT UNDERSTANDS THAT IT MAY BE 6 WEEKS OR SO BEFORE AN INLAB STUDY CAN BE SCHEDULED.      PATIENT SPOKE WITH JUAN AND WAS TOLD THAT THEY CAN BEGIN OX THERAPY AT HER EXPENSE UNTIL TITRATION STUDY IS COMPLETED AND AUTH FOR OX HAS BEEN RECEIVED.      PATIENT STATED THAT AT THIS TIME THEY ARE UNABLE TO COVER THE EXPENSE OF OX OR ANOTHER SLEEP TEST.

## 2023-09-20 ENCOUNTER — HOSPITAL ENCOUNTER (OUTPATIENT)
Dept: MAMMOGRAPHY | Facility: HOSPITAL | Age: 68
Discharge: HOME | End: 2023-09-20
Payer: MEDICARE

## 2023-09-20 ENCOUNTER — HOSPITAL ENCOUNTER (OUTPATIENT)
Dept: ULTRASOUND IMAGING | Facility: HOSPITAL | Age: 68
Discharge: HOME | End: 2023-09-20
Payer: MEDICARE

## 2023-09-20 DIAGNOSIS — R92.8 ABNORMAL MAMMOGRAM: ICD-10-CM

## 2023-09-20 PROCEDURE — 76642 ULTRASOUND BREAST LIMITED: CPT

## 2023-09-20 PROCEDURE — G0279 TOMOSYNTHESIS, MAMMO: HCPCS

## 2023-09-20 PROCEDURE — 77066 DX MAMMO INCL CAD BI: CPT

## 2023-09-21 ENCOUNTER — TRANSCRIBE ORDERS (OUTPATIENT)
Dept: ADMINISTRATIVE | Facility: HOSPITAL | Age: 68
End: 2023-09-21

## 2023-09-21 DIAGNOSIS — R92.8 ABNORMAL MAMMOGRAM: Primary | ICD-10-CM

## 2023-09-22 ENCOUNTER — TRANSCRIBE ORDERS (OUTPATIENT)
Dept: ADMINISTRATIVE | Facility: HOSPITAL | Age: 68
End: 2023-09-22
Payer: MEDICARE

## 2023-09-22 DIAGNOSIS — R92.8 ABNORMAL MAMMOGRAM: Primary | ICD-10-CM

## 2023-09-25 ENCOUNTER — TRANSCRIBE ORDERS (OUTPATIENT)
Dept: ADMINISTRATIVE | Facility: HOSPITAL | Age: 68
End: 2023-09-25
Payer: MEDICARE

## 2023-09-25 DIAGNOSIS — R92.8 ABNORMAL MAMMOGRAM: Primary | ICD-10-CM

## 2023-11-17 ENCOUNTER — HOSPITAL ENCOUNTER (OUTPATIENT)
Dept: SLEEP MEDICINE | Facility: HOSPITAL | Age: 68
End: 2023-11-17
Payer: MEDICARE

## 2023-11-17 VITALS — HEIGHT: 68 IN | BODY MASS INDEX: 44.41 KG/M2 | WEIGHT: 293 LBS

## 2023-11-17 DIAGNOSIS — G47.33 OBSTRUCTIVE SLEEP APNEA, ADULT: ICD-10-CM

## 2023-11-17 DIAGNOSIS — G47.34 NOCTURNAL HYPOXEMIA: ICD-10-CM

## 2023-11-17 PROCEDURE — 95811 POLYSOM 6/>YRS CPAP 4/> PARM: CPT

## 2023-11-27 ENCOUNTER — TELEMEDICINE (OUTPATIENT)
Dept: SLEEP MEDICINE | Facility: CLINIC | Age: 68
End: 2023-11-27
Payer: MEDICARE

## 2023-11-27 VITALS — BODY MASS INDEX: 43.4 KG/M2 | WEIGHT: 293 LBS | HEIGHT: 69 IN

## 2023-11-27 DIAGNOSIS — G47.33 OBSTRUCTIVE SLEEP APNEA, ADULT: ICD-10-CM

## 2023-11-27 DIAGNOSIS — G47.34 NOCTURNAL HYPOXEMIA: Primary | ICD-10-CM

## 2023-11-27 PROCEDURE — 99213 OFFICE O/P EST LOW 20 MIN: CPT | Performed by: NURSE PRACTITIONER

## 2023-11-27 NOTE — PROGRESS NOTES
Chief Complaint:   Chief Complaint   Patient presents with    Follow-up       HPI:    Raquel Carranza is a 68 y.o. female here for follow-up of sleep apnea.  Patient was last seen 8/9/2023.  Patient had to use oxygen in the past bleeding into her CPAP but had not used for some time.  To requalify she did need a titration study which was done 11/17/2023.  It does appear with her CPAP she needs 4 L of oxygen bleeding into her machine.  Patient has not yet received a call from her DME.  Patient does understand if she has not heard from them within 5 business days she is to let me know and I will make sure this is taking care of for her.  Otherwise she is doing well.  She does sleep 7 to 8 hours nightly but tosses and turns due to chronic pain.  Patient puts her [place] score at 10/24.        Current medications are:   Current Outpatient Medications:     allopurinol (ZYLOPRIM) 300 MG tablet, Take 1 tablet by mouth Daily., Disp: , Rfl:     betamethasone dipropionate (DIPROLENE) 0.05 % cream, Apply  topically 2 (two) times a day., Disp: , Rfl:     DULoxetine (CYMBALTA) 30 MG capsule, Take 1 capsule by mouth Daily., Disp: , Rfl:     Ergocalciferol (VITAMIN D2 PO), Take 1.25 mg by mouth 1 (One) Time Per Week. FRIDAYS, Disp: , Rfl:     famotidine (PEPCID) 10 MG tablet, Take 1 tablet by mouth Daily., Disp: , Rfl:     gabapentin (NEURONTIN) 300 MG capsule, Take 2 capsules by mouth Every Evening., Disp: , Rfl:     levothyroxine (SYNTHROID, LEVOTHROID) 137 MCG tablet, Take 1 tablet by mouth Daily., Disp: , Rfl: 3    metoprolol succinate XL (TOPROL-XL) 25 MG 24 hr tablet, Take 1 tablet by mouth Daily., Disp: , Rfl:     mupirocin (BACTROBAN) 2 % ointment, Apply 1 application  topically to the appropriate area as directed 3 (Three) Times a Day., Disp: , Rfl:     oxyCODONE-acetaminophen (PERCOCET) 5-325 MG per tablet, Take 0.5 tablets by mouth Every 4 (Four) Hours As Needed for Moderate Pain ., Disp: 18 tablet, Rfl: 0     rivaroxaban (XARELTO) 20 MG tablet, Take 1 tablet by mouth Daily., Disp: , Rfl:     spironolactone (ALDACTONE) 50 MG tablet, Take 1.5 tablets by mouth Daily., Disp: , Rfl: .      The patient's relevant past medical, surgical, family and social history were reviewed and updated in Epic as appropriate.       Review of Systems   Eyes:  Positive for visual disturbance.   Respiratory:  Positive for apnea.    Cardiovascular:  Positive for palpitations and leg swelling.   Musculoskeletal:  Positive for arthralgias, joint swelling and myalgias.   Allergic/Immunologic: Positive for environmental allergies.   Neurological:  Positive for dizziness and light-headedness.   Psychiatric/Behavioral:  Positive for dysphoric mood and sleep disturbance. The patient is nervous/anxious.    All other systems reviewed and are negative.        Objective:    Physical Exam  Constitutional:       Appearance: Normal appearance.   HENT:      Head: Normocephalic and atraumatic.   Cardiovascular:      Rate and Rhythm: Normal rate.   Pulmonary:      Effort: Pulmonary effort is normal. No respiratory distress.   Skin:     General: Skin is warm and dry.   Neurological:      Mental Status: She is alert and oriented to person, place, and time.   Psychiatric:         Mood and Affect: Mood normal.         Behavior: Behavior normal.         Thought Content: Thought content normal.         Judgment: Judgment normal.         CPAP Report  No download today    The patient continues to use and benefit from CPAP therapy.    ASSESSMENT/PLAN    Diagnoses and all orders for this visit:    1. Nocturnal hypoxemia (Primary)  -     Oxygen Therapy    2. Obstructive sleep apnea, adult  -     Oxygen Therapy        Counseled patient regarding multimodal approach with healthy nutrition, healthy sleep, regular physical activity, social activities, counseling, and medications. Encouraged to practice lateral sleep position. Avoid alcohol and sedatives close to  bedtime.    Patient will continue CPAP therapy and let me know should she not receive her oxygen within 5 business days she does require 4 L bleeding into CPAP at night.  Patient is in [place] and gave consent for video visit.    I have reviewed the results of my evaluation and impression and discussed my recommendations in detail with the patient.      Signed by  Esperanza Almaraz, BRUCE    November 27, 2023      CC: Sommer Paniagua MD         No ref. provider found

## 2024-01-31 ENCOUNTER — OFFICE VISIT (OUTPATIENT)
Dept: ONCOLOGY | Facility: CLINIC | Age: 69
End: 2024-01-31
Payer: MEDICARE

## 2024-01-31 VITALS
RESPIRATION RATE: 24 BRPM | SYSTOLIC BLOOD PRESSURE: 143 MMHG | DIASTOLIC BLOOD PRESSURE: 80 MMHG | WEIGHT: 293 LBS | HEIGHT: 67 IN | TEMPERATURE: 97.1 F | OXYGEN SATURATION: 91 % | BODY MASS INDEX: 45.99 KG/M2 | HEART RATE: 64 BPM

## 2024-01-31 DIAGNOSIS — D75.1 POLYCYTHEMIA, SECONDARY: Primary | ICD-10-CM

## 2024-01-31 PROCEDURE — 3077F SYST BP >= 140 MM HG: CPT | Performed by: INTERNAL MEDICINE

## 2024-01-31 PROCEDURE — 99213 OFFICE O/P EST LOW 20 MIN: CPT | Performed by: INTERNAL MEDICINE

## 2024-01-31 PROCEDURE — 3079F DIAST BP 80-89 MM HG: CPT | Performed by: INTERNAL MEDICINE

## 2024-01-31 PROCEDURE — 1126F AMNT PAIN NOTED NONE PRSNT: CPT | Performed by: INTERNAL MEDICINE

## 2024-01-31 NOTE — PROGRESS NOTES
REASON FOR VISIT: Polycythemia secondary to sleep apnea    HISTORY OF PRESENT ILLNESS:   68 y.o.  female presents today for follow-up.  She has had CPAP with oxygen on board.  Doing well clinically.  She had labs done at her primary care's office recently and her hematocrit is less than 50.  Past medical history, social history and family history was reviewed 01/31/24 and unchanged from prior visit.    Review of Systems:    Review of Systems - Oncology         Medications:        Current Outpatient Medications:     allopurinol (ZYLOPRIM) 300 MG tablet, Take 1 tablet by mouth Daily., Disp: , Rfl:     betamethasone dipropionate (DIPROLENE) 0.05 % cream, Apply  topically 2 (two) times a day., Disp: , Rfl:     DULoxetine (CYMBALTA) 30 MG capsule, Take 1 capsule by mouth Daily., Disp: , Rfl:     Ergocalciferol (VITAMIN D2 PO), Take 1.25 mg by mouth 1 (One) Time Per Week. FRIDAYS, Disp: , Rfl:     famotidine (PEPCID) 10 MG tablet, Take 1 tablet by mouth Daily., Disp: , Rfl:     gabapentin (NEURONTIN) 300 MG capsule, Take 2 capsules by mouth Every Evening., Disp: , Rfl:     levothyroxine (SYNTHROID, LEVOTHROID) 137 MCG tablet, Take 1 tablet by mouth Daily., Disp: , Rfl: 3    metoprolol succinate XL (TOPROL-XL) 25 MG 24 hr tablet, Take 1 tablet by mouth Daily., Disp: , Rfl:     mupirocin (BACTROBAN) 2 % ointment, Apply 1 Application topically to the appropriate area as directed 3 (Three) Times a Day., Disp: , Rfl:     oxyCODONE-acetaminophen (PERCOCET) 5-325 MG per tablet, Take 0.5 tablets by mouth Every 4 (Four) Hours As Needed for Moderate Pain ., Disp: 18 tablet, Rfl: 0    rivaroxaban (XARELTO) 20 MG tablet, Take 1 tablet by mouth Daily., Disp: , Rfl:     spironolactone (ALDACTONE) 50 MG tablet, Take 1.5 tablets by mouth Daily., Disp: , Rfl:     Pain Medications               DULoxetine (CYMBALTA) 30 MG capsule Take 1 capsule by mouth Daily.    gabapentin (NEURONTIN) 300 MG capsule Take 2 capsules by mouth Every  "Evening.    oxyCODONE-acetaminophen (PERCOCET) 5-325 MG per tablet Take 0.5 tablets by mouth Every 4 (Four) Hours As Needed for Moderate Pain .               ALLERGIES:    Allergies   Allergen Reactions    Ace Inhibitors Other (See Comments)     Boils    Adacel [Tetanus-Diphth-Acell Pertussis] Swelling     Preservative in the vaccine. Fever and redness.     Penicillins Shortness Of Breath and Itching    Zostavax [Zoster Vaccine Live] Swelling     Preservative in the vaccine    Lisinopril Unknown - Low Severity    Thimerosal (Thiomersal) Rash         Physical Exam    VITAL SIGNS:  /80 Comment: Left Wrist  Pulse 64   Temp 97.1 °F (36.2 °C) (Infrared)   Resp 24   Ht 170.2 cm (67\")   Wt (!) 142 kg (314 lb)   SpO2 91% Comment: RA  BMI 49.18 kg/m²     ECOG score: 2           Wt Readings from Last 3 Encounters:   01/31/24 (!) 142 kg (314 lb)   11/27/23 136 kg (300 lb)   11/17/23 (!) 141 kg (310 lb 13.6 oz)       Body mass index is 49.18 kg/m². Body surface area is 2.45 meters squared.       Performance Status: 1    General: well appearing, in no acute distress  HEENT: sclera anicteric, neck is supple  Extremities: no lower extremity edema  Skin: no rashes, lesions, bruising, or petechiae  Msk:  Shows no weakness of the large muscle groups  Psych: Mood is stable        RECENT LABS:    Lab Results   Component Value Date    HGB 16.7 (H) 08/23/2022    HCT 51.5 (H) 08/23/2022    .5 (H) 08/23/2022     08/23/2022    WBC 7.91 08/23/2022    NEUTROABS 6.46 02/25/2021    LYMPHSABS 1.04 02/25/2021    MONOSABS 1.02 (H) 02/25/2021    EOSABS 0.27 02/25/2021    BASOSABS 0.10 02/25/2021       Lab Results   Component Value Date    GLUCOSE 94 03/03/2021    BUN 10 03/03/2021    CREATININE 0.71 03/03/2021     03/03/2021    K 5.2 08/23/2022     03/03/2021    CO2 32.0 (H) 03/03/2021    CALCIUM 8.8 03/03/2021    PROTEINTOT 5.5 (L) 02/22/2021    ALBUMIN 2.40 (L) 02/22/2021    BILITOT 0.4 02/22/2021    ALKPHOS " 111 02/22/2021    AST 10 02/22/2021    ALT 7 02/22/2021       No Images in the past 120 days found..        Assessment/Plan    1.  Secondary polycythemia due to sleep apnea.  Now that she has oxygen on board her numbers are improving nicely.  At this point she does not need any further intervention from me.  Usually we do not phlebotomize patients with secondary polycythemia.  Continue to use CPAP with oxygen.  I will see her on an as-needed basis.         Sugey Smiley MD  [facility] Hematology and Oncology         No orders of the defined types were placed in this encounter.      1/31/2024     Future Appointments         Provider Department Center    1/31/2024 1:45 PM (Arrive by 1:30 PM) Sugey Smiley MD [facility] HEMATOLOGY & ONCOLOGY [facility]    8/7/2024 9:00 AM Esperanza Almaraz, APRN [facility] SLEEP MEDICINE [facility]    9/23/2024 1:15 PM S[facility]NEY BR FOLLOW-UP [facility]    9/23/2024 1:30 PM SIDNEY BR SCHEDULED [facility] BREAST [place] [address] [facility]

## 2024-02-02 ENCOUNTER — HOSPITAL ENCOUNTER (OUTPATIENT)
Dept: GENERAL RADIOLOGY | Facility: HOSPITAL | Age: 69
Discharge: HOME | End: 2024-02-02
Payer: MEDICARE

## 2024-02-02 ENCOUNTER — TRANSCRIBE ORDERS (OUTPATIENT)
Dept: ADMINISTRATIVE | Facility: HOSPITAL | Age: 69
End: 2024-02-02
Payer: MEDICARE

## 2024-02-02 DIAGNOSIS — M54.2 CERVICALGIA: Primary | ICD-10-CM

## 2024-02-02 DIAGNOSIS — M54.2 CERVICALGIA: ICD-10-CM

## 2024-02-02 PROCEDURE — 72052 X-RAY EXAM NECK SPINE 6/>VWS: CPT

## 2024-02-12 ENCOUNTER — TRANSCRIBE ORDERS (OUTPATIENT)
Dept: ADMINISTRATIVE | Facility: HOSPITAL | Age: 69
End: 2024-02-12
Payer: MEDICARE

## 2024-02-12 DIAGNOSIS — M50.30 DEGENERATIVE DISC DISEASE, CERVICAL: Primary | ICD-10-CM

## 2024-02-14 ENCOUNTER — TELEPHONE (OUTPATIENT)
Dept: GASTROENTEROLOGY | Facility: CLINIC | Age: 69
End: 2024-02-14

## 2024-02-14 ENCOUNTER — PREP FOR SURGERY (OUTPATIENT)
Dept: OTHER | Facility: HOSPITAL | Age: 69
End: 2024-02-14
Payer: MEDICARE

## 2024-02-14 DIAGNOSIS — Z12.11 SPECIAL SCREENING FOR MALIGNANT NEOPLASMS, COLON: Primary | ICD-10-CM

## 2024-02-15 RX ORDER — SODIUM CHLORIDE 0.9 % (FLUSH) 0.9 %
10 SYRINGE (ML) INJECTION AS NEEDED
OUTPATIENT
Start: 2024-02-15

## 2024-02-15 RX ORDER — DEXTROSE, SODIUM CHLORIDE, SODIUM LACTATE, POTASSIUM CHLORIDE, AND CALCIUM CHLORIDE 5; .6; .31; .03; .02 G/100ML; G/100ML; G/100ML; G/100ML; G/100ML
100 INJECTION, SOLUTION INTRAVENOUS CONTINUOUS
OUTPATIENT
Start: 2024-02-15

## 2024-02-15 RX ORDER — SODIUM CHLORIDE 9 MG/ML
40 INJECTION, SOLUTION INTRAVENOUS AS NEEDED
OUTPATIENT
Start: 2024-02-15

## 2024-02-15 RX ORDER — SODIUM CHLORIDE 0.9 % (FLUSH) 0.9 %
3 SYRINGE (ML) INJECTION EVERY 12 HOURS SCHEDULED
OUTPATIENT
Start: 2024-02-15

## 2024-03-13 ENCOUNTER — HOSPITAL ENCOUNTER (OUTPATIENT)
Dept: MRI IMAGING | Facility: HOSPITAL | Age: 69
Discharge: HOME | End: 2024-03-13
Admitting: FAMILY MEDICINE
Payer: MEDICARE

## 2024-03-13 DIAGNOSIS — M50.30 DEGENERATIVE DISC DISEASE, CERVICAL: ICD-10-CM

## 2024-03-13 PROCEDURE — 72141 MRI NECK SPINE W/O DYE: CPT

## 2024-04-02 RX ORDER — SODIUM, POTASSIUM,MAG SULFATES 17.5-3.13G
SOLUTION, RECONSTITUTED, ORAL ORAL
Qty: 354 ML | Refills: 0 | Status: SHIPPED | OUTPATIENT
Start: 2024-04-02

## 2024-04-12 ENCOUNTER — PRE-ADMISSION TESTING (OUTPATIENT)
Dept: PREADMISSION TESTING | Facility: HOSPITAL | Age: 69
End: 2024-04-12
Payer: MEDICARE

## 2024-04-12 VITALS — BODY MASS INDEX: 43.4 KG/M2 | WEIGHT: 293 LBS | HEIGHT: 69 IN

## 2024-04-12 LAB
DEPRECATED RDW RBC AUTO: 52.2 FL (ref 37–54)
ERYTHROCYTE [DISTWIDTH] IN BLOOD BY AUTOMATED COUNT: 13.2 % (ref 12.3–15.4)
HCT VFR BLD AUTO: 50.5 % (ref 34–46.6)
HGB BLD-MCNC: 16.2 G/DL (ref 12–15.9)
INR PPP: 1.93 (ref 0.89–1.12)
MCH RBC QN AUTO: 34.1 PG (ref 26.6–33)
MCHC RBC AUTO-ENTMCNC: 32.1 G/DL (ref 31.5–35.7)
MCV RBC AUTO: 106.3 FL (ref 79–97)
PLATELET # BLD AUTO: 188 10*3/MM3 (ref 140–450)
PMV BLD AUTO: 9.8 FL (ref 6–12)
POTASSIUM SERPL-SCNC: 5.2 MMOL/L (ref 3.5–5.2)
PROTHROMBIN TIME: 22.3 SECONDS (ref 12.2–14.5)
QT INTERVAL: 410 MS
QTC INTERVAL: 399 MS
RBC # BLD AUTO: 4.75 10*6/MM3 (ref 3.77–5.28)
WBC NRBC COR # BLD AUTO: 8.22 10*3/MM3 (ref 3.4–10.8)

## 2024-04-12 PROCEDURE — 84132 ASSAY OF SERUM POTASSIUM: CPT

## 2024-04-12 PROCEDURE — 93005 ELECTROCARDIOGRAM TRACING: CPT

## 2024-04-12 PROCEDURE — 36415 COLL VENOUS BLD VENIPUNCTURE: CPT

## 2024-04-12 PROCEDURE — 85610 PROTHROMBIN TIME: CPT

## 2024-04-12 PROCEDURE — 85027 COMPLETE CBC AUTOMATED: CPT

## 2024-04-12 RX ORDER — GABAPENTIN 100 MG/1
100 CAPSULE ORAL DAILY
COMMUNITY

## 2024-04-16 ENCOUNTER — ANESTHESIA EVENT (OUTPATIENT)
Dept: GASTROENTEROLOGY | Facility: HOSPITAL | Age: 69
End: 2024-04-16
Payer: MEDICARE

## 2024-04-16 LAB
QT INTERVAL: 410 MS
QTC INTERVAL: 399 MS

## 2024-04-17 ENCOUNTER — ANESTHESIA (OUTPATIENT)
Dept: GASTROENTEROLOGY | Facility: HOSPITAL | Age: 69
End: 2024-04-17
Payer: MEDICARE

## 2024-04-17 ENCOUNTER — HOSPITAL ENCOUNTER (OUTPATIENT)
Facility: HOSPITAL | Age: 69
Setting detail: HOSPITAL OUTPATIENT SURGERY
Discharge: HOME | End: 2024-04-17
Attending: INTERNAL MEDICINE | Admitting: INTERNAL MEDICINE
Payer: MEDICARE

## 2024-04-17 VITALS
RESPIRATION RATE: 18 BRPM | OXYGEN SATURATION: 89 % | DIASTOLIC BLOOD PRESSURE: 57 MMHG | TEMPERATURE: 97 F | SYSTOLIC BLOOD PRESSURE: 122 MMHG | HEART RATE: 68 BPM

## 2024-04-17 DIAGNOSIS — Z12.11 SPECIAL SCREENING FOR MALIGNANT NEOPLASMS, COLON: ICD-10-CM

## 2024-04-17 PROCEDURE — 25010000002 ONDANSETRON PER 1 MG: Performed by: NURSE ANESTHETIST, CERTIFIED REGISTERED

## 2024-04-17 PROCEDURE — 25010000002 PROPOFOL 10 MG/ML EMULSION

## 2024-04-17 PROCEDURE — 45385 COLONOSCOPY W/LESION REMOVAL: CPT | Performed by: INTERNAL MEDICINE

## 2024-04-17 PROCEDURE — 88305 TISSUE EXAM BY PATHOLOGIST: CPT | Performed by: INTERNAL MEDICINE

## 2024-04-17 PROCEDURE — 25810000003 LACTATED RINGERS PER 1000 ML: Performed by: ANESTHESIOLOGY

## 2024-04-17 RX ORDER — LIDOCAINE HYDROCHLORIDE 10 MG/ML
INJECTION, SOLUTION EPIDURAL; INFILTRATION; INTRACAUDAL; PERINEURAL AS NEEDED
Status: DISCONTINUED | OUTPATIENT
Start: 2024-04-17 | End: 2024-04-17 | Stop reason: SURG

## 2024-04-17 RX ORDER — SODIUM CHLORIDE 9 MG/ML
40 INJECTION, SOLUTION INTRAVENOUS AS NEEDED
Status: DISCONTINUED | OUTPATIENT
Start: 2024-04-17 | End: 2024-04-17 | Stop reason: HOSPADM

## 2024-04-17 RX ORDER — SODIUM CHLORIDE, SODIUM LACTATE, POTASSIUM CHLORIDE, CALCIUM CHLORIDE 600; 310; 30; 20 MG/100ML; MG/100ML; MG/100ML; MG/100ML
9 INJECTION, SOLUTION INTRAVENOUS CONTINUOUS
Status: DISCONTINUED | OUTPATIENT
Start: 2024-04-17 | End: 2024-04-17 | Stop reason: HOSPADM

## 2024-04-17 RX ORDER — DEXTROSE, SODIUM CHLORIDE, SODIUM LACTATE, POTASSIUM CHLORIDE, AND CALCIUM CHLORIDE 5; .6; .31; .03; .02 G/100ML; G/100ML; G/100ML; G/100ML; G/100ML
100 INJECTION, SOLUTION INTRAVENOUS CONTINUOUS
Status: DISCONTINUED | OUTPATIENT
Start: 2024-04-17 | End: 2024-04-17 | Stop reason: HOSPADM

## 2024-04-17 RX ORDER — FAMOTIDINE 20 MG/1
20 TABLET, FILM COATED ORAL ONCE
Status: DISCONTINUED | OUTPATIENT
Start: 2024-04-17 | End: 2024-04-17 | Stop reason: HOSPADM

## 2024-04-17 RX ORDER — FAMOTIDINE 10 MG/ML
20 INJECTION, SOLUTION INTRAVENOUS ONCE
Status: COMPLETED | OUTPATIENT
Start: 2024-04-17 | End: 2024-04-17

## 2024-04-17 RX ORDER — SODIUM CHLORIDE 0.9 % (FLUSH) 0.9 %
10 SYRINGE (ML) INJECTION EVERY 12 HOURS SCHEDULED
Status: DISCONTINUED | OUTPATIENT
Start: 2024-04-17 | End: 2024-04-17 | Stop reason: HOSPADM

## 2024-04-17 RX ORDER — SODIUM CHLORIDE 0.9 % (FLUSH) 0.9 %
10 SYRINGE (ML) INJECTION AS NEEDED
Status: DISCONTINUED | OUTPATIENT
Start: 2024-04-17 | End: 2024-04-17 | Stop reason: HOSPADM

## 2024-04-17 RX ORDER — PROPOFOL 10 MG/ML
VIAL (ML) INTRAVENOUS AS NEEDED
Status: DISCONTINUED | OUTPATIENT
Start: 2024-04-17 | End: 2024-04-17 | Stop reason: SURG

## 2024-04-17 RX ORDER — LIDOCAINE HYDROCHLORIDE 10 MG/ML
0.5 INJECTION, SOLUTION EPIDURAL; INFILTRATION; INTRACAUDAL; PERINEURAL ONCE AS NEEDED
Status: DISCONTINUED | OUTPATIENT
Start: 2024-04-17 | End: 2024-04-17 | Stop reason: HOSPADM

## 2024-04-17 RX ORDER — IPRATROPIUM BROMIDE AND ALBUTEROL SULFATE 2.5; .5 MG/3ML; MG/3ML
3 SOLUTION RESPIRATORY (INHALATION) ONCE AS NEEDED
Status: DISCONTINUED | OUTPATIENT
Start: 2024-04-17 | End: 2024-04-17 | Stop reason: HOSPADM

## 2024-04-17 RX ORDER — ONDANSETRON 2 MG/ML
4 INJECTION INTRAMUSCULAR; INTRAVENOUS ONCE AS NEEDED
Status: COMPLETED | OUTPATIENT
Start: 2024-04-17 | End: 2024-04-17

## 2024-04-17 RX ORDER — MIDAZOLAM HYDROCHLORIDE 1 MG/ML
0.5 INJECTION INTRAMUSCULAR; INTRAVENOUS
Status: DISCONTINUED | OUTPATIENT
Start: 2024-04-17 | End: 2024-04-17 | Stop reason: HOSPADM

## 2024-04-17 RX ORDER — SODIUM CHLORIDE 0.9 % (FLUSH) 0.9 %
3 SYRINGE (ML) INJECTION EVERY 12 HOURS SCHEDULED
Status: DISCONTINUED | OUTPATIENT
Start: 2024-04-17 | End: 2024-04-17 | Stop reason: HOSPADM

## 2024-04-17 RX ADMIN — PROPOFOL 50 MG: 10 INJECTION, EMULSION INTRAVENOUS at 15:28

## 2024-04-17 RX ADMIN — SODIUM CHLORIDE, POTASSIUM CHLORIDE, SODIUM LACTATE AND CALCIUM CHLORIDE 9 ML/HR: 600; 310; 30; 20 INJECTION, SOLUTION INTRAVENOUS at 13:26

## 2024-04-17 RX ADMIN — PROPOFOL 100 MCG/KG/MIN: 10 INJECTION, EMULSION INTRAVENOUS at 15:29

## 2024-04-17 RX ADMIN — FAMOTIDINE 20 MG: 10 INJECTION, SOLUTION INTRAVENOUS at 13:25

## 2024-04-17 RX ADMIN — LIDOCAINE HYDROCHLORIDE 50 MG: 10 INJECTION, SOLUTION EPIDURAL; INFILTRATION; INTRACAUDAL; PERINEURAL at 15:28

## 2024-04-17 RX ADMIN — ONDANSETRON 4 MG: 2 INJECTION INTRAMUSCULAR; INTRAVENOUS at 16:28

## 2024-04-17 NOTE — H&P
Gastroenterology Consult Note    Reason for Consultation: Screening    Patient Care Team:  Sommer Paniagua MD as PCP - General (Family Medicine)  Maximiliano Clark MD as Consulting Physician (Vascular Surgery)  Sugey Smiley MD as Consulting Physician (Hematology)    Chief complaint: History of polyps      History of present illness: The patient is a 68-year-old with a history of polyps.  Her last colonoscopy was 10/5/2021.  She had multiple polyps removed.    Allergies   Allergen Reactions    Ace Inhibitors Other (See Comments)     Boils    Adacel [Tetanus-Diphth-Acell Pertussis] Swelling     Preservative in the vaccine. Fever and redness.     Penicillins Shortness Of Breath and Itching     Itching - hives     Zostavax [Zoster Vaccine Live] Swelling     Preservative in the vaccine    Methotrexate Derivatives Other (See Comments)     boils    Lisinopril Unknown - Low Severity    Thimerosal (Thiomersal) Rash     Prior to Admission medications    Medication Sig Start Date End Date Taking? Authorizing Provider   allopurinol (ZYLOPRIM) 300 MG tablet Take 1 tablet by mouth Daily.   Yes Alicia Pa MD   Cyclobenzaprine HCl (FLEXERIL PO) Take  by mouth 2 (Two) Times a Day As Needed.   Yes Alicia Pa MD   DULoxetine (CYMBALTA) 30 MG capsule Take 1 capsule by mouth Daily.   Yes Alicia Pa MD   Ergocalciferol (VITAMIN D2 PO) Take 1.25 mg by mouth 1 (One) Time Per Week. FRIDAYS   Yes Alicia Pa MD   gabapentin (NEURONTIN) 100 MG capsule Take 1 capsule by mouth Daily.   Yes Alicia Pa MD   gabapentin (NEURONTIN) 300 MG capsule Take 2 capsules by mouth Every Evening.   Yes Alicia Pa MD   levothyroxine (SYNTHROID, LEVOTHROID) 137 MCG tablet Take 1 tablet by mouth Daily. 4/10/17  Yes Alicia Pa MD   metoprolol succinate XL (TOPROL-XL) 25 MG 24 hr tablet Take 1 tablet by mouth Daily.   Yes Alicia Pa MD   mupirocin (BACTROBAN) 2 %  ointment Apply 1 Application topically to the appropriate area as directed As Needed.   Yes Alicia Pa MD   oxyCODONE-acetaminophen (PERCOCET) 5-325 MG per tablet Take 0.5 tablets by mouth Every 4 (Four) Hours As Needed for Moderate Pain . 3/5/21  Yes Siena Shell DO   spironolactone (ALDACTONE) 50 MG tablet Take 1.5 tablets by mouth Daily.   Yes Alicia Pa MD   betamethasone dipropionate (DIPROLENE) 0.05 % cream Apply  topically to the appropriate area as directed As Needed.    ProviderAlicia MD   rivaroxaban (XARELTO) 20 MG tablet Take 1 tablet by mouth Daily. Holding for 2 days    ProviderAlicia MD   sodium-potassium-magnesium sulfates (Suprep Bowel Prep Kit) 17.5-3.13-1.6 GM/177ML solution oral solution Use as directed by provider for colonoscopy prep 4/2/24   Ahsan Acuna MD      Current Facility-Administered Medications   Medication Dose Route Frequency Provider Last Rate Last Admin    dextrose 5 % and lactated Ringer's infusion  100 mL/hr Intravenous Continuous Ahsan Acuna MD        famotidine (PEPCID) tablet 20 mg  20 mg Oral Once Molina Hunter Jr., MD        ipratropium-albuterol (DUO-NEB) nebulizer solution 3 mL  3 mL Nebulization Once PRN Babita, Belle A, CRNA        lactated ringers infusion  9 mL/hr Intravenous Continuous Molina Hunter Jr., MD 9 mL/hr at 04/17/24 1521 Currently Infusing at 04/17/24 1521    lidocaine PF 1% (XYLOCAINE) injection 0.5 mL  0.5 mL Injection Once PRN Molina Hunter Jr., MD        midazolam (VERSED) injection 0.5 mg  0.5 mg Intravenous Q10 Min PRN Molina Hunter Jr., MD        ondansetron (ZOFRAN) injection 4 mg  4 mg Intravenous Once PRN Babita, Belle A, CRNA        sodium chloride 0.9 % flush 10 mL  10 mL Intravenous Q12H Molina Hunter Jr., MD        sodium chloride 0.9 % flush 10 mL  10 mL Intravenous PRN Molina Hunter Jr., MD        sodium chloride 0.9 % flush 10 mL  10 mL Intravenous PRN  Ahsan Acuna MD        sodium chloride 0.9 % flush 3 mL  3 mL Intravenous Q12H Ahsan Acuna MD        sodium chloride 0.9 % infusion 40 mL  40 mL Intravenous PRN Molina Hunter Jr., MD        sodium chloride 0.9 % infusion 40 mL  40 mL Intravenous PRN Ahsan Acuna MD          Past Medical History:   Diagnosis Date    Abnormal glucose     Acquired hypothyroidism     Anxiety     Arrhythmia     Arthritis     Bilateral edema of lower extremity     Cataract     left- present- mild    Chronic joint pain     Dry eye     DVT (deep venous thrombosis)     right side mostly    GERD (gastroesophageal reflux disease)     Gout     h/o    Hiatal hernia     still present    History of sepsis     History of transfusion     1970- possible low grade fever but no other issues - doesnt recall why got 1 units    Hypertension     Iron deficiency anemia     Irritable bowel syndrome     On home O2     4L nightly- with cpap machine    Pinched cervical nerve root     c6-7 bone spur    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 rheumatoid factor     Scarlet fever     Sjogren's disease     Sleep apnea     CPAP NIGHTLY    SOBOE (shortness of breath on exertion)     Special screening for malignant neoplasms, colon 02/12/2020    Thrombosis of right saphenous vein     Vitamin D deficiency     Wears glasses      Past Surgical History:   Procedure Laterality Date    APPENDECTOMY      BREAST CYST EXCISION Left     CARDIAC CATHETERIZATION      no interventions    COLONOSCOPY      COLONOSCOPY N/A 10/05/2021    Procedure: Colonoscopy with polypectomy;  Surgeon: Adam Martinez MD;  Location: [facility] END[facility]COPY;  Service: Gastroenterology;  Laterality: N/A;    ENDOSCOPY      HERNIA REPAIR      left ventral hernia repair - mesh in place    LEG EXCISION LESION/CYST Left 02/26/2021    Procedure: INCISION AND DRAINAGE LEFT THIGH ABCESS;  Surgeon: Noe Ken MD;  Location: [facility] OR;  Service:  General;  Laterality: Left;    LEG EXCISION LESION/CYST Left 02/27/2021    Procedure: WOUND EXPLORATION LEFT THIGH FOR CONTROL OF BLEEDING;  Surgeon: Sreedhar Bobby MD;  Location: [facility];  Service: General;  Laterality: Left;     Family History   Problem Relation Age of Onset    Ovarian cancer Mother         unknown    Cancer Mother         leukemia    Hypertension Father     Stroke Father     Heart disease Father     Cancer Father         colon    Diabetes Brother     Diabetes Paternal Aunt     Diabetes Paternal Uncle     Breast cancer Neg Hx      GI Family History  Colon Cancer and No family history of colon polyps or cancers  Social History     Tobacco Use   Smoking Status Every Day    Current packs/day: 0.25    Average packs/day: 0.3 packs/day for 50.0 years (12.5 ttl pk-yrs)    Types: Cigarettes   Smokeless Tobacco Never     Social History     Substance and Sexual Activity   Alcohol Use No      Social History     Substance and Sexual Activity   Drug Use Never        ------  Review of systems:   Please refer to review of systems and pertinent positives in the history of present illness, all other groups tested are negative.    Vital Signs   Temp:  [97.1 °F (36.2 °C)] 97.1 °F (36.2 °C)  Heart Rate:  [67] 67  Resp:  [20] 20  BP: (136)/(74) 136/74    Physical Exam:   General Appearance: Alert, in no acute distress  Head: Normocephalic, without obvious abnormality, atraumatic  Eyes: Lids and lashes normal, conjunctivae and sclerae normal, no icterus, no pallor, corneas clear, PERRLA  Ears: Ears appear intact with no abnormalities noted  Neck: No adenopathy, supple, trachea midline, no thyromegaly, no JVD  Lungs: respirations regular, even and unlabored Heart: Regular rhythm and normal rate  Chest Wall: Symmetrical respiratory expansion  Abdomen: Normal bowel sounds, no masses, no organomegaly, soft nontender, nondistended, no guarding  Extremities:  Moves all extremities well, no edema, no cyanosis, no  redness  Skin: No bleeding, bruising or rash  Neurologic: Cranial nerves 2 - 12 grossly intact, no focal deficits       LABS:   Lab Results (last 48 hours)       ** No results found for the last 48 hours. **          RADIOLOGY:  Imaging Results (Last 24 Hours)       ** No results found for the last 24 hours. **            Assessment & Plan       Special screening for malignant neoplasms, colon      Impressions and plan #1 history of colon polyps and family history of colon cancer: She is here for colonoscopy.  Risks, benefits, alternatives were explained in detail.  She has stopped her anticoagulants appropriately.    I discussed the patient's findings and my recommendations with impressions and plan #1    Ahsan Acuna MD  04/17/24  15:22 EDT

## 2024-04-17 NOTE — OP NOTE
Colonoscopy with polypectomy    Instrument: Adult video colonoscope      Medications: As per anesthesia    Preop diagnosis: History of polyps      Postop diagnosis: Polyps      Indications: The patient is a 68-year-old female with a history of colon polyps and a family history of colon cancer.  Her father had colon cancer.  Her last colonoscopy she had multiple polyps removed on 10/5/2021 and she is here for reevaluation.        Procedure: After the patient was informed of the risks, benefits, alternatives to the procedure, informed consent was obtained.  The endoscope was inserted into the rectum and advanced to the cecum.  Cecum identified by appendiceal orifice and ileocecal valve.  There was a thick viscous coating in the cecum and right colon.  In the right colon there was 2 polyps.  The first was about 6 to 7 mm in size cold snared and removed.  The second was about 6 mm in size cold snared and removed.  These were placed in the same jar.  Further withdrawal revealed no other polyps and transverse colon descending colon sigmoid colon.  At 25 cm there was a small polyp about 5 to 6 mm in size cold snared and removed post polypectomy site looked good and tissue was retrieved.  Retroflexion was performed.  A second pass was made to the cecum.  Patient tolerated the procedure well no immediate complications.  There was no significant bleeding         Impressions and plan #1 prep issues throughout the colon but two right colon polyps removed. One polyp in the sigmoid.  Typically had reevaluate in 3 years and she is not sure if she wants to have a reevaluation at that time.      CC Sommer Paniagua MD

## 2024-04-17 NOTE — ANESTHESIA POSTPROCEDURE EVALUATION
Patient: Raquel Carranza    Procedure Summary       Date: 04/17/24 Room / Location:  [facility]    Anesthesia Start: 1521 Anesthesia Stop: 1601    Procedure: COLONOSCOPY WITH POSSIBLE POLYPECTOMY, BIOPSY, AND CONTROL OF GI BLEEDING Diagnosis:       Special screening for malignant neoplasms, colon      (Special screening for malignant neoplasms, colon [Z12.11])    Surgeons: Ahsan Acuna MD Provider: Molina Hunter Jr., MD    Anesthesia Type: Not recorded ASA Status: 3            Anesthesia Type: No value filed.    Vitals  No vitals data found for the desired time range.          Post Anesthesia Care and Evaluation    Patient location during evaluation: PACU  Patient participation: complete - patient participated  Level of consciousness: awake and alert  Pain management: adequate    Airway patency: patent  Anesthetic complications: No anesthetic complications  PONV Status: none  Cardiovascular status: hemodynamically stable and acceptable  Respiratory status: nonlabored ventilation, acceptable and face mask  Hydration status: acceptable

## 2024-04-17 NOTE — ANESTHESIA PREPROCEDURE EVALUATION
Anesthesia Evaluation     Patient summary reviewed and Nursing notes reviewed   no history of anesthetic complications:   NPO Solid Status: > 8 hours  NPO Liquid Status: > 2 hours           Airway   Mallampati: III  TM distance: <3 FB  Neck ROM: full  Possible difficult intubation  Dental - normal exam     Pulmonary    (+) ,sleep apnea (nightly O2) on CPAP  Cardiovascular     (+) hypertension      Neuro/Psych  GI/Hepatic/Renal/Endo    (+) morbid obesity, hiatal hernia, GERD, renal disease- CRI, thyroid problem hypothyroidism    Musculoskeletal     Abdominal    Substance History      OB/GYN          Other   arthritis,                 Anesthesia Plan    ASA 3        total IV anesthesia  intravenous induction     Anesthetic plan, risks, benefits, and alternatives have been provided, discussed and informed consent has been obtained with: patient.    Plan discussed with CRNA.    CODE STATUS:

## 2024-08-07 ENCOUNTER — TELEMEDICINE (OUTPATIENT)
Dept: SLEEP MEDICINE | Facility: CLINIC | Age: 69
End: 2024-08-07
Payer: MEDICARE

## 2024-08-07 VITALS — HEIGHT: 69 IN | WEIGHT: 293 LBS | BODY MASS INDEX: 43.4 KG/M2

## 2024-08-07 DIAGNOSIS — G47.33 OSA (OBSTRUCTIVE SLEEP APNEA): Primary | ICD-10-CM

## 2024-08-07 DIAGNOSIS — G47.34 NOCTURNAL HYPOXEMIA: ICD-10-CM

## 2024-08-07 PROCEDURE — 1160F RVW MEDS BY RX/DR IN RCRD: CPT | Performed by: NURSE PRACTITIONER

## 2024-08-07 PROCEDURE — 1159F MED LIST DOCD IN RCRD: CPT | Performed by: NURSE PRACTITIONER

## 2024-08-07 PROCEDURE — 99213 OFFICE O/P EST LOW 20 MIN: CPT | Performed by: NURSE PRACTITIONER

## 2024-08-07 NOTE — PROGRESS NOTES
Chief Complaint:   Chief Complaint   Patient presents with    Follow-up       HPI:    Raquel Carranza is a 68 y.o. female here for follow-up of sleep apnea.  Patient was last seen 11/27/2023.  Patient continues to do well with CPAP therapy.  Patient is sleeping 7 to 8 hours nightly and feels rested upon awakening.  She will toss and turn during the night at times due to chronic pain.  Patient does put her Poth score at 7/24.  Her 4 L of oxygen does bleed into her CPAP nightly.  She does well with nasal mask in standard tubing.  No concerns or complaints and will continue therapy.        Current medications are:   Current Outpatient Medications:     allopurinol (ZYLOPRIM) 300 MG tablet, Take 1 tablet by mouth Daily., Disp: , Rfl:     betamethasone dipropionate (DIPROLENE) 0.05 % cream, Apply  topically to the appropriate area as directed As Needed., Disp: , Rfl:     Cyclobenzaprine HCl (FLEXERIL PO), Take  by mouth 2 (Two) Times a Day As Needed., Disp: , Rfl:     DULoxetine (CYMBALTA) 30 MG capsule, Take 1 capsule by mouth Daily., Disp: , Rfl:     Ergocalciferol (VITAMIN D2 PO), Take 1.25 mg by mouth 1 (One) Time Per Week. FRIDAYS, Disp: , Rfl:     gabapentin (NEURONTIN) 100 MG capsule, Take 1 capsule by mouth Daily., Disp: , Rfl:     gabapentin (NEURONTIN) 300 MG capsule, Take 2 capsules by mouth Every Evening., Disp: , Rfl:     levothyroxine (SYNTHROID, LEVOTHROID) 137 MCG tablet, Take 1 tablet by mouth Daily., Disp: , Rfl: 3    metoprolol succinate XL (TOPROL-XL) 25 MG 24 hr tablet, Take 1 tablet by mouth Daily., Disp: , Rfl:     mupirocin (BACTROBAN) 2 % ointment, Apply 1 Application topically to the appropriate area as directed As Needed., Disp: , Rfl:     oxyCODONE-acetaminophen (PERCOCET) 5-325 MG per tablet, Take 0.5 tablets by mouth Every 4 (Four) Hours As Needed for Moderate Pain ., Disp: 18 tablet, Rfl: 0    rivaroxaban (XARELTO) 20 MG tablet, Take 1 tablet by mouth Daily. Holding for 2 days, Disp: ,  Rfl:     spironolactone (ALDACTONE) 50 MG tablet, Take 1.5 tablets by mouth Daily., Disp: , Rfl: .      The patient's relevant past medical, surgical, family and social history were reviewed and updated in Epic as appropriate.       Review of Systems   HENT:  Positive for sneezing.    Eyes:  Positive for visual disturbance.   Respiratory:  Positive for apnea.    Cardiovascular:  Positive for palpitations and leg swelling.   Musculoskeletal:  Positive for arthralgias, joint swelling and myalgias.   Allergic/Immunologic: Positive for environmental allergies.   Neurological:  Positive for dizziness and light-headedness.   Psychiatric/Behavioral:  Positive for dysphoric mood and sleep disturbance. The patient is nervous/anxious.    All other systems reviewed and are negative.        Objective:    Physical Exam  Constitutional:       Appearance: Normal appearance.   HENT:      Head: Normocephalic and atraumatic.   Pulmonary:      Effort: Pulmonary effort is normal. No respiratory distress.   Neurological:      Mental Status: She is alert and oriented to person, place, and time.   Psychiatric:         Mood and Affect: Mood normal.         Behavior: Behavior normal.         Thought Content: Thought content normal.         Judgment: Judgment normal.         CPAP Report    90/90 days of use  Greater than 4-hour use 100%  Settings 10-20  95th percentile pressure 12.7  AHI 0.2  The patient continues to use and benefit from CPAP therapy.    ASSESSMENT/PLAN    Diagnoses and all orders for this visit:    1. MAHESH (obstructive sleep apnea) (Primary)  -     PAP Therapy    2. Nocturnal hypoxemia  Comments:  02 4l bleed into cpap        Counseled patient regarding multimodal approach with healthy nutrition, healthy sleep, regular physical activity, social activities, counseling, and medications. Encouraged to practice lateral sleep position. Avoid alcohol and sedatives close to bedtime.  Refill supplies x 1 year.  Return to clinic 1  year or sooner symptoms warrant.    The patient is located in [facility] at home. The patient presents today for telehealth service.  This service was conducted via audio/video technology through a secure [employer] EDI video visit connection through Epic.  This provider is located in [facility].  Patient stated they are in a secure environment for the session.  Patient's condition being diagnosed/treated is appropriate for telemedicine.  The provider identified himself as well as his credentials.  The patient, and/or patient's guardian, consent to be seen remotely, and when consent is given they understanding that the consent allows for patient identifiable information to be sent to a third-party as needed.  They may refuse to be seen remotely at any time.  The electronic data is encrypted and password protected, and the patient and/or guardian has been advised of the potential risk to privacy not withstanding such measures.  Patient identifiers used: Name and date of birth.   I have reviewed the results of my evaluation and impression and discussed my recommendations in detail with the patient.      Signed by  BRUCE Laughlin    August 7, 2024      CC: Sommer Paniagua MD         No ref. provider found

## 2024-09-08 LAB
NCCN CRITERIA FLAG: NORMAL
TYRER CUZICK SCORE: 4.2

## 2024-09-23 ENCOUNTER — HOSPITAL ENCOUNTER (OUTPATIENT)
Dept: MAMMOGRAPHY | Facility: HOSPITAL | Age: 69
Discharge: HOME | End: 2024-09-23
Payer: MEDICARE

## 2024-09-23 ENCOUNTER — HOSPITAL ENCOUNTER (OUTPATIENT)
Dept: ULTRASOUND IMAGING | Facility: HOSPITAL | Age: 69
Discharge: HOME | End: 2024-09-23
Payer: MEDICARE

## 2024-09-23 DIAGNOSIS — R92.8 ABNORMAL MAMMOGRAM: ICD-10-CM

## 2024-09-23 PROCEDURE — 76642 ULTRASOUND BREAST LIMITED: CPT | Performed by: RADIOLOGY

## 2024-09-23 PROCEDURE — G0279 TOMOSYNTHESIS, MAMMO: HCPCS

## 2024-09-23 PROCEDURE — G0279 TOMOSYNTHESIS, MAMMO: HCPCS | Performed by: RADIOLOGY

## 2024-09-23 PROCEDURE — 77066 DX MAMMO INCL CAD BI: CPT | Performed by: RADIOLOGY

## 2024-09-23 PROCEDURE — 76642 ULTRASOUND BREAST LIMITED: CPT

## 2024-09-23 PROCEDURE — 77066 DX MAMMO INCL CAD BI: CPT

## 2024-11-14 ENCOUNTER — TRANSCRIBE ORDERS (OUTPATIENT)
Dept: GENERAL RADIOLOGY | Facility: HOSPITAL | Age: 69
End: 2024-11-14
Payer: MEDICARE

## 2024-11-14 ENCOUNTER — HOSPITAL ENCOUNTER (OUTPATIENT)
Dept: GENERAL RADIOLOGY | Facility: HOSPITAL | Age: 69
Discharge: HOME | End: 2024-11-14
Admitting: INTERNAL MEDICINE
Payer: MEDICARE

## 2024-11-14 DIAGNOSIS — M54.9 BACK PAIN, UNSPECIFIED BACK LOCATION, UNSPECIFIED BACK PAIN LATERALITY, UNSPECIFIED CHRONICITY: ICD-10-CM

## 2024-11-14 DIAGNOSIS — M54.9 BACK PAIN, UNSPECIFIED BACK LOCATION, UNSPECIFIED BACK PAIN LATERALITY, UNSPECIFIED CHRONICITY: Primary | ICD-10-CM

## 2024-11-14 PROCEDURE — 72110 X-RAY EXAM L-2 SPINE 4/>VWS: CPT

## 2024-12-04 ENCOUNTER — TRANSCRIBE ORDERS (OUTPATIENT)
Dept: ADMINISTRATIVE | Facility: HOSPITAL | Age: 69
End: 2024-12-04
Payer: MEDICARE

## 2024-12-04 DIAGNOSIS — M54.9 ACUTE MID BACK PAIN: Primary | ICD-10-CM

## 2024-12-14 ENCOUNTER — HOSPITAL ENCOUNTER (OUTPATIENT)
Dept: MRI IMAGING | Facility: HOSPITAL | Age: 69
Discharge: HOME | End: 2024-12-14
Payer: MEDICARE

## 2024-12-14 DIAGNOSIS — M54.9 ACUTE MID BACK PAIN: ICD-10-CM

## 2024-12-14 PROCEDURE — 72148 MRI LUMBAR SPINE W/O DYE: CPT

## 2025-01-02 ENCOUNTER — OFFICE VISIT (OUTPATIENT)
Dept: NEUROSURGERY | Facility: CLINIC | Age: 70
End: 2025-01-02
Payer: MEDICARE

## 2025-01-02 VITALS — WEIGHT: 293 LBS | TEMPERATURE: 96.9 F | BODY MASS INDEX: 43.4 KG/M2 | HEIGHT: 69 IN

## 2025-01-02 DIAGNOSIS — M54.16 LUMBAR RADICULOPATHY: Primary | ICD-10-CM

## 2025-01-02 RX ORDER — LIDOCAINE 50 MG/G
PATCH TOPICAL
COMMUNITY

## 2025-01-02 RX ORDER — MUPIROCIN CALCIUM 20 MG/G
1 CREAM TOPICAL 3 TIMES DAILY
COMMUNITY

## 2025-01-02 RX ORDER — ONDANSETRON 4 MG/1
TABLET, ORALLY DISINTEGRATING ORAL
COMMUNITY

## 2025-01-02 RX ORDER — CLOTRIMAZOLE 1 %
1 CREAM (GRAM) TOPICAL
COMMUNITY
Start: 2024-07-10

## 2025-01-02 NOTE — PROGRESS NOTES
Patient: Raquel Carranza  : 1955    Primary Care Provider: Sommer Paniagua MD    Requesting Provider: As above      Chief Complaint: Back Pain and Leg Pain (RLE>LLE; Burning in right foot)      History of Present Illness: This is a 69 y.o. female who presents with 2 months of back and right leg pain.  The patient denies an initiating event, but states she began having pain starting back and then radiated down the posterior aspect of the right leg to the top of the foot.  She was given a Medrol Dosepak which helped her symptoms while she was taking it, but once it was completed her pain did return.  Over the past month or so she has noticed improvement in her symptoms her back is not hurting as much but she still has pain in the distal right lower extremity.  She has not had any injections in her back or physical therapy recently.    PMHX  Allergies:  Allergies   Allergen Reactions    Ace Inhibitors Other (See Comments)     Boils    Adacel [Tetanus-Diphth-Acell Pertussis] Swelling     Preservative in the vaccine. Fever and redness.     Penicillins Shortness Of Breath and Itching     Itching - hives     Zostavax [Zoster Vaccine Live] Swelling     Preservative in the vaccine    Methotrexate Derivatives Other (See Comments)     boils    Lisinopril Unknown - Low Severity    Thimerosal (Thiomersal) Rash     Medications    Current Outpatient Medications:     allopurinol (ZYLOPRIM) 300 MG tablet, Take 1 tablet by mouth Daily., Disp: , Rfl:     betamethasone dipropionate (DIPROLENE) 0.05 % cream, Apply  topically to the appropriate area as directed As Needed., Disp: , Rfl:     clotrimazole (LOTRIMIN) 1 % cream, 1 Application., Disp: , Rfl:     Cyclobenzaprine HCl (FLEXERIL PO), Take  by mouth 2 (Two) Times a Day As Needed., Disp: , Rfl:     DULoxetine (CYMBALTA) 30 MG capsule, Take 1 capsule by mouth Daily., Disp: , Rfl:     Ergocalciferol (VITAMIN D2 PO), Take 1.25 mg by mouth 1 (One) Time Per Week. , Disp:  , Rfl:     gabapentin (NEURONTIN) 100 MG capsule, Take 1 capsule by mouth Daily., Disp: , Rfl:     gabapentin (NEURONTIN) 300 MG capsule, Take 2 capsules by mouth Every Evening., Disp: , Rfl:     levothyroxine (SYNTHROID, LEVOTHROID) 137 MCG tablet, Take 1 tablet by mouth Daily., Disp: , Rfl: 3    lidocaine (LIDODERM) 5 %, 1 AS NEEDED (5 %), Disp: , Rfl:     metoprolol succinate XL (TOPROL-XL) 25 MG 24 hr tablet, Take 1 tablet by mouth Daily., Disp: , Rfl:     mupirocin (BACTROBAN) 2 % cream, Apply 1 Application topically to the appropriate area as directed 3 (Three) Times a Day., Disp: , Rfl:     mupirocin (BACTROBAN) 2 % ointment, Apply 1 Application topically to the appropriate area as directed As Needed., Disp: , Rfl:     ondansetron ODT (ZOFRAN-ODT) 4 MG disintegrating tablet, , Disp: , Rfl:     oxyCODONE-acetaminophen (PERCOCET) 5-325 MG per tablet, Take 0.5 tablets by mouth Every 4 (Four) Hours As Needed for Moderate Pain ., Disp: 18 tablet, Rfl: 0    rivaroxaban (XARELTO) 20 MG tablet, Take 1 tablet by mouth Daily. Holding for 2 days, Disp: , Rfl:     spironolactone (ALDACTONE) 50 MG tablet, Take 1.5 tablets by mouth Daily., Disp: , Rfl:   Past Medical History:  Past Medical History:   Diagnosis Date    Abnormal glucose     Acquired hypothyroidism     Anxiety     Arrhythmia     Arthritis     Bilateral edema of lower extremity     Breast injury 04/2024    patient fell and hit right brest on wooden desk. around 11:00 she had a bruise and it turned into thrombosis in that area    Cataract     left- present- mild    Chronic joint pain     Chronic kidney disease     Dry eye     DVT (deep venous thrombosis)     right side mostly    GERD (gastroesophageal reflux disease)     Gout     h/o    Hiatal hernia     still present    History of sepsis     History of transfusion     1970- possible low grade fever but no other issues - doesnt recall why got 1 units    Hypertension     Iron deficiency anemia     Irritable  bowel syndrome     Low back pain     On home O2     4L nightly- with cpap machine    Pinched cervical nerve root     c6-7 bone spur    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 rheumatoid factor     Scarlet fever     Sjogren's disease     Sleep apnea     CPAP NIGHTLY    SOBOE (shortness of breath on exertion)     Special screening for malignant neoplasms, colon 02/12/2020    Thrombosis of right saphenous vein     Vitamin D deficiency     Wears glasses      Past Surgical History:  Past Surgical History:   Procedure Laterality Date    APPENDECTOMY      BREAST CYST EXCISION Left     CARDIAC CATHETERIZATION      no interventions    COLONOSCOPY      COLONOSCOPY N/A 10/05/2021    Procedure: Colonoscopy with polypectomy;  Surgeon: Adam Martinez MD;  Location:  [facility];  Service: Gastroenterology;  Laterality: N/A;    COLONOSCOPY N/A 04/17/2024    Procedure: COLONOSCOPY WITH POLYPECTOMY;  Surgeon: Ahsan Acuna MD;  Location:  [facility];  Service: Gastroenterology;  Laterality: N/A;  two polyps removed with cold snare in ascending colon, one polyp removed sigmoid colon with cold snare    ENDOSCOPY      EPIDURAL BLOCK      HERNIA REPAIR      left ventral hernia repair - mesh in place    LEG EXCISION LESION/CYST Left 02/26/2021    Procedure: INCISION AND DRAINAGE LEFT THIGH ABCESS;  Surgeon: Noe Ken MD;  Location:  [facility];  Service: General;  Laterality: Left;    LEG EXCISION LESION/CYST Left 02/27/2021    Procedure: WOUND EXPLORATION LEFT THIGH FOR CONTROL OF BLEEDING;  Surgeon: Sreedhar Bobby MD;  Location:  [facility];  Service: General;  Laterality: Left;     Social Hx:  Social History     Tobacco Use    Smoking status: Every Day     Current packs/day: 0.25     Average packs/day: 0.3 packs/day for 50.0 years (12.5 ttl pk-yrs)     Types: Cigarettes     Passive exposure: Current    Smokeless tobacco: Never   Vaping Use    Vaping status: Never Used    Substance Use Topics    Alcohol use: No    Drug use: Never     Family Hx:  Family History   Problem Relation Age of Onset    Ovarian cancer Mother         unknown    Cancer Mother         leukemia    Hypertension Father     Stroke Father     Heart disease Father     Cancer Father         colon    Diabetes Brother     Diabetes Paternal Aunt     Diabetes Paternal Uncle     Kidney disease Maternal Grandmother     Breast cancer Neg Hx      Review of Systems:        Review of Systems   Constitutional:  Negative for activity change, appetite change, chills, diaphoresis, fatigue, fever and unexpected weight change.   HENT:  Negative for congestion, dental problem, drooling, ear discharge, ear pain, facial swelling, hearing loss, mouth sores, nosebleeds, postnasal drip, rhinorrhea, sinus pressure, sinus pain, sneezing, sore throat, tinnitus, trouble swallowing and voice change.    Eyes:  Negative for photophobia, pain, discharge, redness, itching and visual disturbance.   Respiratory:  Negative for apnea, cough, choking, chest tightness, shortness of breath, wheezing and stridor.    Cardiovascular:  Negative for chest pain, palpitations and leg swelling.   Gastrointestinal:  Negative for abdominal distention, abdominal pain, anal bleeding, blood in stool, constipation, diarrhea, nausea, rectal pain and vomiting.   Endocrine: Negative for cold intolerance, heat intolerance, polydipsia, polyphagia and polyuria.   Genitourinary:  Negative for decreased urine volume, difficulty urinating, dysuria, enuresis, flank pain, frequency, genital sores, hematuria and urgency.   Musculoskeletal:  Positive for arthralgias, back pain, gait problem and myalgias. Negative for joint swelling, neck pain and neck stiffness.   Skin:  Negative for color change, pallor, rash and wound.   Allergic/Immunologic: Negative for environmental allergies, food allergies and immunocompromised state.   Neurological:  Positive for weakness. Negative for  "dizziness, tremors, seizures, syncope, facial asymmetry, speech difficulty, light-headedness, numbness and headaches.   Hematological:  Negative for adenopathy. Does not bruise/bleed easily.   Psychiatric/Behavioral:  Negative for agitation, behavioral problems, confusion, decreased concentration, dysphoric mood, hallucinations, self-injury, sleep disturbance and suicidal ideas. The patient is not nervous/anxious and is not hyperactive.    All other systems reviewed and are negative.       Physical Exam:   Temp 96.9 °F (36.1 °C) (Infrared)   Ht 175.3 cm (69\")   Wt 136 kg (299 lb)   BMI 44.15 kg/m²   Awake, alert and oriented x 3  Speech f/c  Opens eyes spont  Pupils 3 mm rx bilaterally  Extraocular muscles intact bilaterally  Normal sensation to light touch in all 3 distributions of CN V bilaterally  Face symmetric bilaterally  Tongue midline  5/5 in the lower extremities bilaterally  She is quite tender to palpation on the back of her right knee    Diagnostic Studies:  All neurological imaging studies were independently reviewed unless stated otherwise    Assessment/Plan:  This is a 69 y.o. female presenting with 2 months of back and right leg pain.  In reviewing her lumbar MRI, she has transitional anatomy but has moderate degenerative changes and stenosis at myself and the radiologist are referring to as the L4-5 level.  I do not appreciate any clear compression of the traversing L5 nerve root to explain the patient's symptoms.  She has had some improvement with time and steroids, but I would like to add physical therapy and an epidural injection.  We are going have the patient follow-up with my PA in 6 weeks with flexion-extension lumbar x-rays.  We will see how she is doing at that time.    Diagnoses and all orders for this visit:    1. Lumbar radiculopathy (Primary)      Raquel Calabrese Dillon  reports that she has been smoking cigarettes. She has a 12.5 pack-year smoking history. She has been exposed to tobacco " smoke. She has never used smokeless tobacco.         Sohail Mims MD  01/02/25  13:16 EST

## (undated) DEVICE — HANDPIECE SET WITH HIGH FLOW TIP AND SUCTION TUBE: Brand: INTERPULSE

## (undated) DEVICE — LUBE JELLY FOIL PACK 1.4 OZ: Brand: MEDLINE INDUSTRIES, INC.

## (undated) DEVICE — BANDAGE,GAUZE,BULKEE II,4.5"X4.1YD,STRL: Brand: MEDLINE

## (undated) DEVICE — SNAR POLYP CAPTIVATOR MICROHEX 13 240CM

## (undated) DEVICE — KT ORCA ORCAPOD DISP STRL

## (undated) DEVICE — SYR LUERLOK 50ML

## (undated) DEVICE — TUBING, SUCTION, 1/4" X 10', STRAIGHT: Brand: MEDLINE

## (undated) DEVICE — SUT ETHLN 3/0 PSLX 30IN 1683H

## (undated) DEVICE — SINGLE-USE POLYPECTOMY SNARE: Brand: SENSATION SHORT THROW

## (undated) DEVICE — CONTN GRAD MEAS TRIANG 32OZ BLK

## (undated) DEVICE — SOLIDIFIER LIQ PREMISORB 1500CC

## (undated) DEVICE — INTRO ACCSR BLNT TP

## (undated) DEVICE — SAFELINER SUCTION CANISTER 1000CC: Brand: DEROYAL

## (undated) DEVICE — PK MINOR SPLT 10

## (undated) DEVICE — LUBE GEL ENDOGLIDE 1.1OZ

## (undated) DEVICE — GLV SURG SENSICARE PI MIC PF SZ7.5 LF STRL

## (undated) DEVICE — GLV SURG PREMIERPRO MIC LTX PF SZ8 BRN

## (undated) DEVICE — CVR HNDL LIGHT RIGID

## (undated) DEVICE — ANTIBACTERIAL UNDYED BRAIDED (POLYGLACTIN 910), SYNTHETIC ABSORBABLE SUTURE: Brand: COATED VICRYL

## (undated) DEVICE — GOWN,PREVENTION PLUS,XXLARGE,STERILE: Brand: MEDLINE

## (undated) DEVICE — APPL CHLORAPREP TINTED 26ML TEAL

## (undated) DEVICE — SOL IRR H2O BTL 1000ML STRL

## (undated) DEVICE — ADAPT CLN LUM OLYMP AIR/H20

## (undated) DEVICE — HYBRID CO2 TUBING/CAP SET FOR OLYMPUS® SCOPES & CO2 SOURCE: Brand: ERBE

## (undated) DEVICE — SINGLE-USE BIOPSY FORCEPS: Brand: RADIAL JAW 4

## (undated) DEVICE — FIRST STEP BEDSIDE ADD WATER KIT - RESEALABLE STAND-UP POUCH, ENDOSCOPIC CLEANING PAD - 1 POUCH: Brand: FIRST STEP BEDSIDE ADD WATER KIT - RESEALABLE STAND-UP POUCH, ENDOSCOPIC CLEANIN

## (undated) DEVICE — ST LINER SAFECAP GRN RED CP STRL

## (undated) DEVICE — GLV SURG SENSICARE PI MIC PF SZ7 LF STRL

## (undated) DEVICE — GLV SURG PREMIERPRO MIC LTX PF SZ7.5 BRN